# Patient Record
Sex: MALE | Race: WHITE | NOT HISPANIC OR LATINO | Employment: UNEMPLOYED | URBAN - METROPOLITAN AREA
[De-identification: names, ages, dates, MRNs, and addresses within clinical notes are randomized per-mention and may not be internally consistent; named-entity substitution may affect disease eponyms.]

---

## 2018-02-25 ENCOUNTER — HOSPITAL ENCOUNTER (EMERGENCY)
Facility: HOSPITAL | Age: 58
Discharge: PSYCHIATRIC OR SUBSTANCE USE FACILITY | End: 2018-02-25
Attending: EMERGENCY MEDICINE
Payer: MEDICAID

## 2018-02-25 VITALS
RESPIRATION RATE: 14 BRPM | SYSTOLIC BLOOD PRESSURE: 119 MMHG | HEART RATE: 68 BPM | TEMPERATURE: 98 F | OXYGEN SATURATION: 97 % | WEIGHT: 150 LBS | DIASTOLIC BLOOD PRESSURE: 67 MMHG

## 2018-02-25 DIAGNOSIS — F29 PSYCHOSIS, UNSPECIFIED PSYCHOSIS TYPE: Primary | ICD-10-CM

## 2018-02-25 LAB
ALBUMIN SERPL BCP-MCNC: 3 G/DL
ALP SERPL-CCNC: 85 U/L
ALT SERPL W/O P-5'-P-CCNC: 30 U/L
AMPHET+METHAMPHET UR QL: NEGATIVE
AMPHET+METHAMPHET UR QL: NEGATIVE
ANION GAP SERPL CALC-SCNC: 12 MMOL/L
APAP SERPL-MCNC: <3 UG/ML
AST SERPL-CCNC: 46 U/L
BACTERIA #/AREA URNS AUTO: ABNORMAL /HPF
BARBITURATES UR QL SCN>200 NG/ML: NEGATIVE
BARBITURATES UR QL SCN>200 NG/ML: NEGATIVE
BASOPHILS # BLD AUTO: 0.04 K/UL
BASOPHILS NFR BLD: 0.3 %
BENZODIAZ UR QL SCN>200 NG/ML: NEGATIVE
BENZODIAZ UR QL SCN>200 NG/ML: NEGATIVE
BILIRUB SERPL-MCNC: 1.3 MG/DL
BILIRUB UR QL STRIP: NEGATIVE
BUN SERPL-MCNC: 16 MG/DL
BZE UR QL SCN: NEGATIVE
BZE UR QL SCN: NEGATIVE
CALCIUM SERPL-MCNC: 7.5 MG/DL
CANNABINOIDS UR QL SCN: NEGATIVE
CANNABINOIDS UR QL SCN: NEGATIVE
CHLORIDE SERPL-SCNC: 105 MMOL/L
CLARITY UR REFRACT.AUTO: ABNORMAL
CO2 SERPL-SCNC: 15 MMOL/L
COLOR UR AUTO: ABNORMAL
CREAT SERPL-MCNC: 0.8 MG/DL
CREAT UR-MCNC: 148 MG/DL
CREAT UR-MCNC: 148 MG/DL
DIFFERENTIAL METHOD: ABNORMAL
EOSINOPHIL # BLD AUTO: 0 K/UL
EOSINOPHIL NFR BLD: 0.3 %
ERYTHROCYTE [DISTWIDTH] IN BLOOD BY AUTOMATED COUNT: 12.6 %
EST. GFR  (AFRICAN AMERICAN): >60 ML/MIN/1.73 M^2
EST. GFR  (NON AFRICAN AMERICAN): >60 ML/MIN/1.73 M^2
ETHANOL SERPL-MCNC: <10 MG/DL
ETHANOL UR-MCNC: <10 MG/DL
GLUCOSE SERPL-MCNC: 79 MG/DL
GLUCOSE UR QL STRIP: NEGATIVE
HCT VFR BLD AUTO: 33.6 %
HGB BLD-MCNC: 11.8 G/DL
HGB UR QL STRIP: ABNORMAL
HYALINE CASTS UR QL AUTO: 21 /LPF
IMM GRANULOCYTES # BLD AUTO: 0.07 K/UL
IMM GRANULOCYTES NFR BLD AUTO: 0.5 %
KETONES UR QL STRIP: ABNORMAL
LEUKOCYTE ESTERASE UR QL STRIP: NEGATIVE
LYMPHOCYTES # BLD AUTO: 0.9 K/UL
LYMPHOCYTES NFR BLD: 6.5 %
MCH RBC QN AUTO: 31.8 PG
MCHC RBC AUTO-ENTMCNC: 35.1 G/DL
MCV RBC AUTO: 91 FL
METHADONE UR QL SCN>300 NG/ML: NEGATIVE
METHADONE UR QL SCN>300 NG/ML: NEGATIVE
MICROSCOPIC COMMENT: ABNORMAL
MONOCYTES # BLD AUTO: 1.2 K/UL
MONOCYTES NFR BLD: 9 %
NEUTROPHILS # BLD AUTO: 11.4 K/UL
NEUTROPHILS NFR BLD: 83.4 %
NITRITE UR QL STRIP: NEGATIVE
NRBC BLD-RTO: 0 /100 WBC
OPIATES UR QL SCN: NEGATIVE
OPIATES UR QL SCN: NEGATIVE
PCP UR QL SCN>25 NG/ML: NEGATIVE
PCP UR QL SCN>25 NG/ML: NEGATIVE
PH UR STRIP: 5 [PH] (ref 5–8)
PLATELET # BLD AUTO: 179 K/UL
PLATELET BLD QL SMEAR: ABNORMAL
PMV BLD AUTO: 10.5 FL
POTASSIUM SERPL-SCNC: 3.7 MMOL/L
PROT SERPL-MCNC: 6.4 G/DL
PROT UR QL STRIP: NEGATIVE
RBC # BLD AUTO: 3.71 M/UL
RBC #/AREA URNS AUTO: 4 /HPF (ref 0–4)
SALICYLATES SERPL-MCNC: <5 MG/DL
SODIUM SERPL-SCNC: 132 MMOL/L
SP GR UR STRIP: 1.02 (ref 1–1.03)
SQUAMOUS #/AREA URNS AUTO: 0 /HPF
T4 FREE SERPL-MCNC: 1.25 NG/DL
TOXICOLOGY INFORMATION: NORMAL
TOXICOLOGY INFORMATION: NORMAL
TSH SERPL DL<=0.005 MIU/L-ACNC: 0.32 UIU/ML
URN SPEC COLLECT METH UR: ABNORMAL
UROBILINOGEN UR STRIP-ACNC: ABNORMAL EU/DL
WBC # BLD AUTO: 13.67 K/UL
WBC #/AREA URNS AUTO: 1 /HPF (ref 0–5)

## 2018-02-25 PROCEDURE — 96375 TX/PRO/DX INJ NEW DRUG ADDON: CPT

## 2018-02-25 PROCEDURE — 80329 ANALGESICS NON-OPIOID 1 OR 2: CPT

## 2018-02-25 PROCEDURE — 84439 ASSAY OF FREE THYROXINE: CPT

## 2018-02-25 PROCEDURE — 93005 ELECTROCARDIOGRAM TRACING: CPT | Mod: 59

## 2018-02-25 PROCEDURE — 99285 EMERGENCY DEPT VISIT HI MDM: CPT | Mod: 25

## 2018-02-25 PROCEDURE — 63600175 PHARM REV CODE 636 W HCPCS

## 2018-02-25 PROCEDURE — 84443 ASSAY THYROID STIM HORMONE: CPT

## 2018-02-25 PROCEDURE — 99285 EMERGENCY DEPT VISIT HI MDM: CPT | Mod: ,,, | Performed by: EMERGENCY MEDICINE

## 2018-02-25 PROCEDURE — 80307 DRUG TEST PRSMV CHEM ANLYZR: CPT

## 2018-02-25 PROCEDURE — 81001 URINALYSIS AUTO W/SCOPE: CPT

## 2018-02-25 PROCEDURE — 93010 ELECTROCARDIOGRAM REPORT: CPT | Mod: ,,, | Performed by: INTERNAL MEDICINE

## 2018-02-25 PROCEDURE — 85025 COMPLETE CBC W/AUTO DIFF WBC: CPT

## 2018-02-25 PROCEDURE — 80053 COMPREHEN METABOLIC PANEL: CPT

## 2018-02-25 PROCEDURE — 80320 DRUG SCREEN QUANTALCOHOLS: CPT

## 2018-02-25 PROCEDURE — 96374 THER/PROPH/DIAG INJ IV PUSH: CPT

## 2018-02-25 RX ORDER — LORAZEPAM 2 MG/ML
2 INJECTION INTRAMUSCULAR
Status: COMPLETED | OUTPATIENT
Start: 2018-02-25 | End: 2018-02-25

## 2018-02-25 RX ORDER — HALOPERIDOL 5 MG/ML
5 INJECTION INTRAMUSCULAR EVERY 4 HOURS PRN
Status: DISCONTINUED | OUTPATIENT
Start: 2018-02-25 | End: 2018-02-25 | Stop reason: HOSPADM

## 2018-02-25 RX ORDER — HALOPERIDOL 5 MG/ML
5 INJECTION INTRAMUSCULAR
Status: COMPLETED | OUTPATIENT
Start: 2018-02-25 | End: 2018-02-25

## 2018-02-25 RX ORDER — LORAZEPAM 2 MG/ML
INJECTION INTRAMUSCULAR
Status: COMPLETED
Start: 2018-02-25 | End: 2018-02-25

## 2018-02-25 RX ORDER — DIPHENHYDRAMINE HYDROCHLORIDE 50 MG/ML
25 INJECTION INTRAMUSCULAR; INTRAVENOUS
Status: COMPLETED | OUTPATIENT
Start: 2018-02-25 | End: 2018-02-25

## 2018-02-25 RX ORDER — HALOPERIDOL 5 MG/ML
INJECTION INTRAMUSCULAR
Status: COMPLETED
Start: 2018-02-25 | End: 2018-02-25

## 2018-02-25 RX ORDER — DIPHENHYDRAMINE HYDROCHLORIDE 50 MG/ML
INJECTION INTRAMUSCULAR; INTRAVENOUS
Status: COMPLETED
Start: 2018-02-25 | End: 2018-02-25

## 2018-02-25 RX ORDER — LORAZEPAM 2 MG/ML
2 INJECTION INTRAMUSCULAR EVERY 4 HOURS PRN
Status: DISCONTINUED | OUTPATIENT
Start: 2018-02-25 | End: 2018-02-25 | Stop reason: HOSPADM

## 2018-02-25 RX ADMIN — DIPHENHYDRAMINE HYDROCHLORIDE 25 MG: 50 INJECTION INTRAMUSCULAR; INTRAVENOUS at 03:02

## 2018-02-25 RX ADMIN — LORAZEPAM 2 MG: 2 INJECTION INTRAMUSCULAR at 03:02

## 2018-02-25 RX ADMIN — HALOPERIDOL 5 MG: 5 INJECTION INTRAMUSCULAR at 03:02

## 2018-02-25 RX ADMIN — HALOPERIDOL LACTATE 5 MG: 5 INJECTION, SOLUTION INTRAMUSCULAR at 03:02

## 2018-02-25 RX ADMIN — LORAZEPAM 2 MG: 2 INJECTION, SOLUTION INTRAMUSCULAR; INTRAVENOUS at 03:02

## 2018-02-25 RX ADMIN — DIPHENHYDRAMINE HYDROCHLORIDE 25 MG: 50 INJECTION, SOLUTION INTRAMUSCULAR; INTRAVENOUS at 03:02

## 2018-02-25 NOTE — ED PROVIDER NOTES
"Encounter Date: 2/25/2018    SCRIBE #1 NOTE: I, Micheline Shah, am scribing for, and in the presence of,  Dr. Osborne. I have scribed the following portions of the note - the EKG reading and the Resident attestation.       History     Chief Complaint   Patient presents with    Psychiatric Evaluation     pt presents to the ed for a psych eval was found by [employer] saying " im a [employer] Im not suppposed to steal."      She is a 57-year-old male with unknown medical history, who presents via police for psychiatric evaluation x 1 day. History is limited to the medical record as the patient was brought by police and required sedative medication to protect himself and staff, due to severe agitation. Per police reports, he was found by police walking in the street, repeating phrases like "I'm a [employer], I'm not supposed to steal". Reportedly he was severely agitated in the field, requiring multiple police officers to restrain him. In the ED he was severely agitated and required IV medications to protect himself and the staff.           Review of patient's allergies indicates:  Not on File  No past medical history on file.  No past surgical history on file.  No family history on file.  Social History   Substance Use Topics    Smoking status: Not on file    Smokeless tobacco: Not on file    Alcohol use Not on file     Review of Systems   Unable to perform ROS: Psychiatric disorder (Not able to be performed due to severe agitation, then sedation after medications)       Physical Exam     Initial Vitals [02/25/18 0246]   BP Pulse Resp Temp SpO2   (!) 170/90 104 18 98.2 °F (36.8 °C) 98 %      MAP       116.67         Physical Exam    Nursing note and vitals reviewed.  Constitutional: He appears well-developed and well-nourished. He is not diaphoretic. No distress.   HENT:   Head: Normocephalic.       Mouth/Throat: No oropharyngeal exudate.   No septal hematoma or hemotympanum.    Eyes: Pupils are equal, round, and reactive to " light. Right eye exhibits no discharge. Left eye exhibits no discharge. No scleral icterus.   Neck: No tracheal deviation present.   Cardiovascular: Normal rate, regular rhythm, normal heart sounds and intact distal pulses. Exam reveals no friction rub.    No murmur heard.  Tachy in triage, currently in the 90s. Radial pulses are synchronous and symmetric bilaterally.    Pulmonary/Chest: Breath sounds normal. No respiratory distress. He has no wheezes. He has no rhonchi. He has no rales.       Abdominal: Soft. Bowel sounds are normal. He exhibits no distension. There is no tenderness. There is no guarding.   Musculoskeletal: He exhibits no edema or tenderness.   Patient has skin abrasions listed, but no bony crepitus or deformity through palpation of his upper and lower extremities. No palpable crepitus in his chest wall or evidence of chest wall deformity.    Neurological:   Solmnolent post sedative medications. I have observed him moving all extremities.    Skin: Skin is warm and dry. Capillary refill takes less than 2 seconds.   Psychiatric: He has a normal mood and affect.         ED Course   Procedures  Labs Reviewed   CBC W/ AUTO DIFFERENTIAL - Abnormal; Notable for the following:        Result Value    WBC 13.67 (*)     RBC 3.71 (*)     Hemoglobin 11.8 (*)     Hematocrit 33.6 (*)     MCH 31.8 (*)     Gran # (ANC) 11.4 (*)     Immature Grans (Abs) 0.07 (*)     Lymph # 0.9 (*)     Mono # 1.2 (*)     Gran% 83.4 (*)     Lymph% 6.5 (*)     All other components within normal limits   TSH - Abnormal; Notable for the following:     TSH 0.321 (*)     All other components within normal limits   URINALYSIS - Abnormal; Notable for the following:     Appearance, UA Hazy (*)     Ketones, UA 3+ (*)     Occult Blood UA 2+ (*)     Urobilinogen, UA 2.0-3.0 (*)     All other components within normal limits   ACETAMINOPHEN LEVEL - Abnormal; Notable for the following:     Acetaminophen (Tylenol), Serum <3.0 (*)     All other  components within normal limits   COMPREHENSIVE METABOLIC PANEL - Abnormal; Notable for the following:     Sodium 132 (*)     CO2 15 (*)     Calcium 7.5 (*)     Albumin 3.0 (*)     Total Bilirubin 1.3 (*)     AST 46 (*)     All other components within normal limits   SALICYLATE LEVEL - Abnormal; Notable for the following:     Salicylate Lvl <5.0 (*)     All other components within normal limits   URINALYSIS MICROSCOPIC - Abnormal; Notable for the following:     Bacteria, UA Few (*)     Hyaline Casts, UA 21 (*)     All other components within normal limits   DRUG SCREEN PANEL, URINE EMERGENCY   ALCOHOL,MEDICAL (ETHANOL)   TOXICOLOGY SCREEN, URINE, RANDOM (COMPLIANCE)   T4, FREE     EKG Readings: (Independently Interpreted)   Sinus tachycardia at 102 bpm, LAD, ST segments are normal and T waves are normal.          Medical Decision Making:   History:   Old Medical Records: I decided to obtain old medical records.  Independently Interpreted Test(s):   I have ordered and independently interpreted EKG Reading(s) - see prior notes  Clinical Tests:   Lab Tests: Ordered and Reviewed  Medical Tests: Ordered and Reviewed       APC / Resident Notes:   PGY-3 MDM:   -Patient is a 57 y.o. presenting with a chief complaint of psychiatric disorder x 1 day. Vital signs stable, patient afebrile, non-tachycardic and non-hypoxic.   -Patient required multiple police to apprehend him in the street, after being found wandering repeating that he does not steal. He required sedative medications due to severe agitation in the ED. Will order psychiatric clearance medications and PEC as he is a danger to himself.     Workup ongoing, will continue to re-assess patient and update management as needed.           Pt is medically cleared for inpatient psychiatric admission.         Patient Care Update:  -Tylenol, Salicylates and alcohol negative.   -CBC shows slight leukocytoiss to 13.6 which may be related to agitation, as well as normocytic  anemia. TSH low at 0.31 but FT4 normal. EKG with sinus tach. Urine tox pending.   -Care signed out to oncoming team for following Urine tox and clearance after chemical sedative medications.  -In brief, patient with reported history of Schizophrenia per police, was found in the street repeating odd phrases and displaying severe agitation, which required chemical sedation in the ED. He is pending urinalysis for medical clearance and may be medically cleared for psychiatry once this returns and once his chemical sedation clears.     Dereje Knott,   [employer] EM/IM PGY-3  2/25/2018 6:43 AM            Scribe Attestation:   Scribe #1: I performed the above scribed service and the documentation accurately describes the services I performed. I attest to the accuracy of the note.    Attending Attestation:   Physician Attestation Statement for Resident:  As the supervising MD   Physician Attestation Statement: I have personally seen and examined this patient.   I agree with the above history. -: Pt with hx of schizophrenia presenting in restraints for yelling in public. Pt was agitated and psychotic on arrival and required chemical sedation. Will obtain psych screening labs, PEC completed.    As the supervising MD I agree with the above PE.    As the supervising MD I agree with the above treatment, course, plan, and disposition.  I have reviewed and agree with the residents interpretation of the following: EKG and lab data.  I have reviewed the following: old records at this facility.          Physician Attestation for Scribe:      Comments: I, Dr. Jesus Osborne, personally performed the services described in this documentation. All medical record entries made by the scribe were at my direction and in my presence.  I have reviewed the chart and agree that the record reflects my personal performance and is accurate and complete. Jesus Osborne MD.  6:53 AM 02/25/2018                 Clinical Impression:   The encounter diagnosis  was Psychosis, unspecified psychosis type.                           Dereje Knott MD  Resident  02/25/18 5427

## 2018-02-25 NOTE — ED NOTES
Pt assisted to bed. Pt identifiers verified.  Appearance: Pt is awake and alert to person.  Respiratory: respirations are even and unlabored. Airway is patent  Skin: skin is warm, dry, intact and appropriately colored for ethnicity  Neurologic: pt is moving all extremities without difficulty. Sensation is intact. Speech is clear and appropriate. Facial movement is symmetrical.   Cardiac: No edema noted. Peripheral pulses are intact and palpable. Cap refill is <3 seconds.     Bed is in low, locked position with side rails upx2. Call light is in reach. Will continue to monitor

## 2018-02-25 NOTE — ED NOTES
[facility] contacted for transport to [facility]. [facility] advised transportation will arrived within an hour.

## 2018-02-25 NOTE — ED TRIAGE NOTES
57 year old pt presents to the ed with complaints of needing a psy evaluation. Pt was fighting with police and being  disruptive. Pt is disoriented x 2. Pt denies chest pain sob or nausea.

## 2018-02-25 NOTE — ED NOTES
Faxed clinical packet to [facility], [place], [facility] ([place], [place], [place]), [facility] ([place]), [facility], [facility], [facility], [facility], [facility], [facility], [facility], [facility], [facility], [facility], [facility], [facility], [facility], [facility], [facility] [facility], [place], [facility], [facility], [facility], [facility], [facility], [facility], [facility], [facility]), [facility], [facility], [facility], and [facility]. Awaiting response at this time.

## 2018-02-25 NOTE — ED NOTES
Juan Reynaga pt has been accepted to [facility] ([facility]) under the care of . The number to call report [phone].

## 2018-08-02 ENCOUNTER — HOSPITAL ENCOUNTER (INPATIENT)
Facility: HOSPITAL | Age: 58
LOS: 3 days | Discharge: HOME | DRG: 101 | End: 2018-08-05
Attending: EMERGENCY MEDICINE | Admitting: EMERGENCY MEDICINE
Payer: MEDICAID

## 2018-08-02 DIAGNOSIS — Z59.00 HOMELESS: ICD-10-CM

## 2018-08-02 DIAGNOSIS — R20.0 NUMBNESS IN FEET: ICD-10-CM

## 2018-08-02 DIAGNOSIS — G40.909 RECURRENT SEIZURES: Primary | ICD-10-CM

## 2018-08-02 DIAGNOSIS — G40.109 LOCALIZATION-RELATED EPILEPSY: ICD-10-CM

## 2018-08-02 DIAGNOSIS — Z91.148 NONCOMPLIANCE WITH MEDICATIONS: ICD-10-CM

## 2018-08-02 DIAGNOSIS — R79.89 LOW VITAMIN B12 LEVEL: ICD-10-CM

## 2018-08-02 LAB
ANION GAP SERPL CALC-SCNC: 5 MMOL/L
BUN SERPL-MCNC: 16 MG/DL
CALCIUM SERPL-MCNC: 8.6 MG/DL
CHLORIDE SERPL-SCNC: 107 MMOL/L
CO2 SERPL-SCNC: 26 MMOL/L
CREAT SERPL-MCNC: 0.9 MG/DL
EST. GFR  (AFRICAN AMERICAN): >60 ML/MIN/1.73 M^2
EST. GFR  (NON AFRICAN AMERICAN): >60 ML/MIN/1.73 M^2
GLUCOSE SERPL-MCNC: 114 MG/DL
LACTATE SERPL-SCNC: 0.9 MMOL/L
POTASSIUM SERPL-SCNC: 3.7 MMOL/L
SODIUM SERPL-SCNC: 138 MMOL/L
TROPONIN I SERPL DL<=0.01 NG/ML-MCNC: <0.006 NG/ML

## 2018-08-02 PROCEDURE — 83605 ASSAY OF LACTIC ACID: CPT | Mod: 91

## 2018-08-02 PROCEDURE — 99285 EMERGENCY DEPT VISIT HI MDM: CPT | Mod: 25,27

## 2018-08-02 PROCEDURE — 25000003 PHARM REV CODE 250: Performed by: PHYSICIAN ASSISTANT

## 2018-08-02 PROCEDURE — G0378 HOSPITAL OBSERVATION PER HR: HCPCS

## 2018-08-02 PROCEDURE — 80048 BASIC METABOLIC PNL TOTAL CA: CPT

## 2018-08-02 PROCEDURE — 84484 ASSAY OF TROPONIN QUANT: CPT | Mod: 91

## 2018-08-02 PROCEDURE — 12000002 HC ACUTE/MED SURGE SEMI-PRIVATE ROOM

## 2018-08-02 PROCEDURE — 99285 EMERGENCY DEPT VISIT HI MDM: CPT | Mod: ,,, | Performed by: EMERGENCY MEDICINE

## 2018-08-02 PROCEDURE — 99220 PR INITIAL OBSERVATION CARE,LEVL III: CPT | Mod: ,,, | Performed by: PHYSICIAN ASSISTANT

## 2018-08-02 RX ORDER — SODIUM CHLORIDE 0.9 % (FLUSH) 0.9 %
5 SYRINGE (ML) INJECTION
Status: DISCONTINUED | OUTPATIENT
Start: 2018-08-02 | End: 2018-08-05 | Stop reason: HOSPADM

## 2018-08-02 RX ORDER — GLUCAGON 1 MG
1 KIT INJECTION
Status: DISCONTINUED | OUTPATIENT
Start: 2018-08-02 | End: 2018-08-05 | Stop reason: HOSPADM

## 2018-08-02 RX ORDER — LEVETIRACETAM 500 MG/1
500 TABLET ORAL 2 TIMES DAILY
Status: DISCONTINUED | OUTPATIENT
Start: 2018-08-02 | End: 2018-08-03

## 2018-08-02 RX ORDER — PROMETHAZINE HYDROCHLORIDE 25 MG/1
25 TABLET ORAL EVERY 6 HOURS PRN
Status: DISCONTINUED | OUTPATIENT
Start: 2018-08-02 | End: 2018-08-05 | Stop reason: HOSPADM

## 2018-08-02 RX ORDER — AMOXICILLIN 250 MG
1 CAPSULE ORAL 2 TIMES DAILY PRN
Status: DISCONTINUED | OUTPATIENT
Start: 2018-08-02 | End: 2018-08-05 | Stop reason: HOSPADM

## 2018-08-02 RX ORDER — IBUPROFEN 200 MG
16 TABLET ORAL
Status: DISCONTINUED | OUTPATIENT
Start: 2018-08-02 | End: 2018-08-05 | Stop reason: HOSPADM

## 2018-08-02 RX ORDER — KETOROLAC TROMETHAMINE 30 MG/ML
15 INJECTION, SOLUTION INTRAMUSCULAR; INTRAVENOUS EVERY 6 HOURS PRN
Status: DISCONTINUED | OUTPATIENT
Start: 2018-08-02 | End: 2018-08-05 | Stop reason: HOSPADM

## 2018-08-02 RX ORDER — IPRATROPIUM BROMIDE AND ALBUTEROL SULFATE 2.5; .5 MG/3ML; MG/3ML
3 SOLUTION RESPIRATORY (INHALATION) EVERY 4 HOURS PRN
Status: DISCONTINUED | OUTPATIENT
Start: 2018-08-02 | End: 2018-08-05 | Stop reason: HOSPADM

## 2018-08-02 RX ORDER — IBUPROFEN 200 MG
24 TABLET ORAL
Status: DISCONTINUED | OUTPATIENT
Start: 2018-08-02 | End: 2018-08-05 | Stop reason: HOSPADM

## 2018-08-02 RX ORDER — RAMELTEON 8 MG/1
8 TABLET ORAL NIGHTLY PRN
Status: DISCONTINUED | OUTPATIENT
Start: 2018-08-02 | End: 2018-08-05 | Stop reason: HOSPADM

## 2018-08-02 RX ORDER — ACETAMINOPHEN 325 MG/1
650 TABLET ORAL EVERY 4 HOURS PRN
Status: DISCONTINUED | OUTPATIENT
Start: 2018-08-02 | End: 2018-08-05 | Stop reason: HOSPADM

## 2018-08-02 RX ORDER — ONDANSETRON 4 MG/1
4 TABLET, ORALLY DISINTEGRATING ORAL EVERY 8 HOURS PRN
Status: DISCONTINUED | OUTPATIENT
Start: 2018-08-02 | End: 2018-08-05 | Stop reason: HOSPADM

## 2018-08-02 RX ADMIN — LEVETIRACETAM 500 MG: 500 TABLET ORAL at 09:08

## 2018-08-02 SDOH — SOCIAL DETERMINANTS OF HEALTH (SDOH): HOMELESSNESS UNSPECIFIED: Z59.00

## 2018-08-03 PROBLEM — Z59.00 HOMELESS: Status: ACTIVE | Noted: 2018-08-03

## 2018-08-03 PROBLEM — Z91.148 NONCOMPLIANCE WITH MEDICATIONS: Status: ACTIVE | Noted: 2018-08-03

## 2018-08-03 LAB
25(OH)D3+25(OH)D2 SERPL-MCNC: 33 NG/ML
ANION GAP SERPL CALC-SCNC: 6 MMOL/L
BASOPHILS # BLD AUTO: 0.05 K/UL
BASOPHILS NFR BLD: 0.6 %
BILIRUB UR QL STRIP: NEGATIVE
BUN SERPL-MCNC: 12 MG/DL
CALCIUM SERPL-MCNC: 8.9 MG/DL
CHLORIDE SERPL-SCNC: 108 MMOL/L
CLARITY UR REFRACT.AUTO: CLEAR
CO2 SERPL-SCNC: 26 MMOL/L
COLOR UR AUTO: YELLOW
CREAT SERPL-MCNC: 0.8 MG/DL
DIFFERENTIAL METHOD: ABNORMAL
EOSINOPHIL # BLD AUTO: 0 K/UL
EOSINOPHIL NFR BLD: 0.4 %
ERYTHROCYTE [DISTWIDTH] IN BLOOD BY AUTOMATED COUNT: 13.2 %
EST. GFR  (AFRICAN AMERICAN): >60 ML/MIN/1.73 M^2
EST. GFR  (NON AFRICAN AMERICAN): >60 ML/MIN/1.73 M^2
GLUCOSE SERPL-MCNC: 79 MG/DL
GLUCOSE UR QL STRIP: NEGATIVE
HCT VFR BLD AUTO: 37.5 %
HGB BLD-MCNC: 12.8 G/DL
HGB UR QL STRIP: NEGATIVE
HIV 1+2 AB+HIV1 P24 AG SERPL QL IA: NEGATIVE
IMM GRANULOCYTES # BLD AUTO: 0.03 K/UL
IMM GRANULOCYTES NFR BLD AUTO: 0.4 %
KETONES UR QL STRIP: NEGATIVE
LEUKOCYTE ESTERASE UR QL STRIP: NEGATIVE
LYMPHOCYTES # BLD AUTO: 1.5 K/UL
LYMPHOCYTES NFR BLD: 18.7 %
MAGNESIUM SERPL-MCNC: 2.4 MG/DL
MCH RBC QN AUTO: 31.4 PG
MCHC RBC AUTO-ENTMCNC: 34.1 G/DL
MCV RBC AUTO: 92 FL
MONOCYTES # BLD AUTO: 1 K/UL
MONOCYTES NFR BLD: 11.9 %
NEUTROPHILS # BLD AUTO: 5.5 K/UL
NEUTROPHILS NFR BLD: 68 %
NITRITE UR QL STRIP: NEGATIVE
NRBC BLD-RTO: 0 /100 WBC
PH UR STRIP: 6 [PH] (ref 5–8)
PHOSPHATE SERPL-MCNC: 3.3 MG/DL
PLATELET # BLD AUTO: 244 K/UL
PMV BLD AUTO: 10.5 FL
POTASSIUM SERPL-SCNC: 3.6 MMOL/L
PROT UR QL STRIP: NEGATIVE
RBC # BLD AUTO: 4.08 M/UL
SODIUM SERPL-SCNC: 140 MMOL/L
SP GR UR STRIP: 1.01 (ref 1–1.03)
URN SPEC COLLECT METH UR: NORMAL
UROBILINOGEN UR STRIP-ACNC: NEGATIVE EU/DL
VIT B12 SERPL-MCNC: 324 PG/ML
WBC # BLD AUTO: 8.14 K/UL

## 2018-08-03 PROCEDURE — 84100 ASSAY OF PHOSPHORUS: CPT

## 2018-08-03 PROCEDURE — 95813 EEG EXTND MNTR 61-119 MIN: CPT

## 2018-08-03 PROCEDURE — 12000002 HC ACUTE/MED SURGE SEMI-PRIVATE ROOM

## 2018-08-03 PROCEDURE — 36415 COLL VENOUS BLD VENIPUNCTURE: CPT

## 2018-08-03 PROCEDURE — 80048 BASIC METABOLIC PNL TOTAL CA: CPT

## 2018-08-03 PROCEDURE — 85025 COMPLETE CBC W/AUTO DIFF WBC: CPT

## 2018-08-03 PROCEDURE — 80177 DRUG SCRN QUAN LEVETIRACETAM: CPT

## 2018-08-03 PROCEDURE — 82306 VITAMIN D 25 HYDROXY: CPT

## 2018-08-03 PROCEDURE — 86703 HIV-1/HIV-2 1 RESULT ANTBDY: CPT

## 2018-08-03 PROCEDURE — 81003 URINALYSIS AUTO W/O SCOPE: CPT

## 2018-08-03 PROCEDURE — 84207 ASSAY OF VITAMIN B-6: CPT

## 2018-08-03 PROCEDURE — 99233 SBSQ HOSP IP/OBS HIGH 50: CPT | Mod: ,,, | Performed by: PSYCHIATRY & NEUROLOGY

## 2018-08-03 PROCEDURE — 82607 VITAMIN B-12: CPT

## 2018-08-03 PROCEDURE — 84425 ASSAY OF VITAMIN B-1: CPT

## 2018-08-03 PROCEDURE — 25000003 PHARM REV CODE 250: Performed by: PHYSICIAN ASSISTANT

## 2018-08-03 PROCEDURE — 95813 EEG EXTND MNTR 61-119 MIN: CPT | Mod: 26,,, | Performed by: PSYCHIATRY & NEUROLOGY

## 2018-08-03 PROCEDURE — 83735 ASSAY OF MAGNESIUM: CPT

## 2018-08-03 PROCEDURE — 99223 1ST HOSP IP/OBS HIGH 75: CPT | Mod: ,,, | Performed by: PHYSICIAN ASSISTANT

## 2018-08-03 RX ORDER — LEVETIRACETAM 500 MG/1
1000 TABLET ORAL 2 TIMES DAILY
Status: DISCONTINUED | OUTPATIENT
Start: 2018-08-03 | End: 2018-08-05 | Stop reason: HOSPADM

## 2018-08-03 RX ADMIN — LEVETIRACETAM 1000 MG: 500 TABLET ORAL at 09:08

## 2018-08-03 RX ADMIN — LEVETIRACETAM 500 MG: 500 TABLET ORAL at 08:08

## 2018-08-03 NOTE — SUBJECTIVE & OBJECTIVE
Past Medical History:   Diagnosis Date    Seizures        History reviewed. No pertinent surgical history.    Review of patient's allergies indicates:  No Known Allergies      Current Facility-Administered Medications on File Prior to Encounter   Medication    [COMPLETED] acetaminophen tablet 1,000 mg    [COMPLETED] levETIRAcetam (KEPPRA) 2,000 mg in dextrose 5 % 250 mL IVPB    [COMPLETED] sodium chloride 0.9% bolus 1,000 mL     Current Outpatient Prescriptions on File Prior to Encounter   Medication Sig    levETIRAcetam (KEPPRA) 500 MG Tab Take 1 tablet (500 mg total) by mouth 2 (two) times daily.     Family History     None        Social History Main Topics    Smoking status: Former Smoker    Smokeless tobacco: Never Used    Alcohol use Yes    Drug use: No    Sexual activity: Not on file     Review of Systems   Constitutional: Negative.    HENT: Positive for dental problem (broke teeth last year) and nosebleeds.    Eyes: Negative.    Respiratory: Negative.    Cardiovascular: Negative.  Negative for chest pain.   Gastrointestinal: Negative.    Endocrine: Negative.    Genitourinary: Negative.         No incontinence   Musculoskeletal: Negative.  Negative for gait problem, neck pain and neck stiffness.   Skin: Positive for wound (nose wound).   Allergic/Immunologic: Negative.    Neurological: Positive for seizures. Negative for dizziness, tremors, syncope, facial asymmetry, speech difficulty, weakness, light-headedness, numbness and headaches.   Hematological: Negative.    Psychiatric/Behavioral: Negative.      Objective:     Vital Signs (Most Recent):  Temp: 98 °F (36.7 °C) (08/03/18 0844)  Pulse: 72 (08/03/18 0844)  Resp: 18 (08/03/18 0844)  BP: 108/67 (08/03/18 0844)  SpO2: 96 % (08/03/18 0844) Vital Signs (24h Range):  Temp:  [97.1 °F (36.2 °C)-98.4 °F (36.9 °C)] 98 °F (36.7 °C)  Pulse:  [58-99] 72  Resp:  [16-20] 18  SpO2:  [94 %-100 %] 96 %  BP: ()/(55-90) 108/67     Weight: 74.4 kg (164 lb 0.4  oz)  Body mass index is 23.53 kg/m².    Physical Exam   Constitutional: He is oriented to person, place, and time. He appears well-developed and well-nourished.   HENT:   Head: Normocephalic and atraumatic.   Right Ear: External ear normal.   Left Ear: External ear normal.   Eyes: Conjunctivae and EOM are normal. Pupils are equal, round, and reactive to light.   Neck: Normal range of motion. Neck supple.   Cardiovascular: Normal rate, regular rhythm and normal heart sounds.    Pulmonary/Chest: Effort normal and breath sounds normal.   Abdominal: Soft. Bowel sounds are normal. There is no tenderness.   Musculoskeletal: Normal range of motion. He exhibits no edema or deformity.   Neurological: He is alert and oriented to person, place, and time. He has a normal Finger-Nose-Finger Test. Gait normal.   Reflex Scores:       Tricep reflexes are 2+ on the right side and 2+ on the left side.       Bicep reflexes are 2+ on the right side and 2+ on the left side.       Brachioradialis reflexes are 2+ on the right side and 2+ on the left side.       Patellar reflexes are 2+ on the right side and 2+ on the left side.       Achilles reflexes are 2+ on the right side and 2+ on the left side.  Skin: Skin is warm and dry. Capillary refill takes less than 2 seconds.   Psychiatric: He has a normal mood and affect. His speech is normal and behavior is normal.       NEUROLOGICAL EXAMINATION:     MENTAL STATUS   Oriented to person, place, and time.   Attention: normal. Concentration: normal.   Speech: speech is normal   Level of consciousness: alert    CRANIAL NERVES     CN III, IV, VI   Pupils are equal, round, and reactive to light.  Extraocular motions are normal.   CN III: no CN III palsy  CN VI: no CN VI palsy  Nystagmus: none   Diplopia: none    CN V   Facial sensation intact.     CN VII   Facial expression full, symmetric.     CN VIII   CN VIII normal.     CN IX, X   CN IX normal.     CN XI   CN XI normal.     CN XII   CN XII  normal.     MOTOR EXAM   Muscle bulk: normal  Overall muscle tone: normal    Strength   Right neck flexion: 5/5  Left neck flexion: 5/5  Right neck extension: 5/5  Left neck extension: 5/5  Right deltoid: 5/5  Left deltoid: 5/5  Right biceps: 5/5  Left biceps: 5/5  Right triceps: 5/5  Left triceps: 5/5  Right wrist flexion: 5/5  Left wrist flexion: 5/5  Right wrist extension: 5/5  Left wrist extension: 5/5  Right interossei: 5/5  Left interossei: 5/5  Right abdominals: 5/5  Left abdominals: 5/5  Right iliopsoas: 5/5  Left iliopsoas: 5/5  Right quadriceps: 5/5  Left quadriceps: 5/5  Right hamstrin/5  Left hamstrin/5  Right glutei: 5/5  Left glutei: 5/5  Right anterior tibial: 5/5  Left anterior tibial: 5/5  Right posterior tibial: 5/5  Left posterior tibial: 5/5  Right peroneal: 5/5  Left peroneal: 5/5  Right gastroc: 5/5  Left gastroc: 5/5    REFLEXES     Reflexes   Right brachioradialis: 2+  Left brachioradialis: 2+  Right biceps: 2+  Left biceps: 2+  Right triceps: 2+  Left triceps: 2+  Right patellar: 2+  Left patellar: 2+  Right achilles: 2+  Left achilles: 2+  Right plantar: normal  Left plantar: normal    SENSORY EXAM   Light touch normal.     GAIT AND COORDINATION     Gait  Gait: normal     Coordination   Finger to nose coordination: normal    Tremor   Resting tremor: absent  Intention tremor: absent      Significant Labs:   Recent Lab Results       18  0450 18  2136 18  1704 18  1628 18  1623      Benzodiazepines    Negative      Methadone metabolites    Negative      Phencyclidine    Negative      Immature Granulocytes 0.4         Immature Grans (Abs) 0.03  Comment:  Mild elevation in immature granulocytes is non specific and   can be seen in a variety of conditions including stress response,   acute inflammation, trauma and pregnancy. Correlation with other   laboratory and clinical findings is essential.           Albumin          Alcohol, Medical, Serum           Alkaline Phosphatase          Allens Test   N/A  N/A     ALT          Amphetamine Screen, Ur    Negative      Anion Gap 6(L) 5(L)        AST          Barbiturate Screen, Ur    Negative      Baso # 0.05         Basophil% 0.6         Total Bilirubin          Site   Other  Other     BUN, Bld 12 16        Calcium 8.9 8.6(L)        Chloride 108 107        CO2 26 26        Cocaine (Metab.)    Negative      Creatinine 0.8 0.9        Creatinine, Random Ur    44.0  Comment:  The random urine reference ranges provided were established   for 24 hour urine collections.  No reference ranges exist for  random urine specimens.  Correlate clinically.        Differential Method Automated         eGFR if  >60.0 >60.0        eGFR if non  >60.0  Comment:  Calculation used to obtain the estimated glomerular filtration  rate (eGFR) is the CKD-EPI equation.    >60.0  Comment:  Calculation used to obtain the estimated glomerular filtration  rate (eGFR) is the CKD-EPI equation.           Eos # 0.0         Eosinophil% 0.4         Glucose 79 114(H)        Gran # (ANC) 5.5         Gran% 68.0         Hematocrit 37.5(L)         Hemoglobin 12.8(L)         Lactate, Johnny  0.9  Comment:  Falsely low lactic acid results can be found in samples   containing >=13.0 mg/dL total bilirubin and/or >=3.5 mg/dL   direct bilirubin.          Lymph # 1.5         Lymph% 18.7         Magnesium 2.4         MCH 31.4(H)         MCHC 34.1         MCV 92         Mono # 1.0         Mono% 11.9         MPV 10.5         nRBC 0         Opiate Scrn, Ur    Negative      Phosphorus 3.3         Platelets 244         POC BE   0  -1     POC HCO3   25.2  24.5     POC Lactate   1.24       POC PCO2   40.5  42.3     POC PH   7.402  7.371     POC PO2   41  42     POC SATURATED O2   77(L)  76(L)     POC TCO2   26  26     POCT Glucose          Potassium 3.6 3.7        Total Protein          RBC 4.08(L)         RDW 13.2         Sample   VENOUS  VENOUS      Sodium 140 138        Marijuana (THC) Metabolite    Negative      Toxicology Information    SEE COMMENT  Comment:  This screen includes the following classes of drugs at the   listed cut-off:  Benzodiazepines                  200 ng/ml  Methadone                        300 ng/ml  Cocaine metabolite               300 ng/ml  Opiates                          300 ng/ml  Barbiturates                     200 ng/ml  Amphetamines                    1000 ng/ml  Marijuana metabs (THC)            50 ng/ml  Phencyclidine (PCP)               25 ng/ml  High concentrations of Diphenhydramine may cross-react with  Phencyclidine PCP screening immunoassay giving a false   positive result.  High concentrations of Methylenedioxymethamphetamine (MDMA aka  Ectasy) and other structurally similar compounds may cross-   react with the Amphetamine/Methamphetamine screening   immunoassay giving a false positive result.  A metabolite of the anti-HIV drug Sustiva () may cause  false positive results in the Marijuana metabolite (THC)   screening assay.  Note: This exception list includes only more common   interferants in toxicology screen testing.  Because of many   cross-reactantspositive results on toxicology drug screens   should be confirmed whenever results do not correlate with   clinical presentation.  This report is intended for use in clinical monitoring and  management of patients. It is not intended for use in   employment related drug testing.  Because of any cross-reactants, positive results on toxicology  drug screens should be confirmed whenever results do not  correlate with clinical presentation.  Presumptive positive results are unconfirmed and may be used   only for medical purposes.        Troponin I  <0.006  Comment:  The reference interval for Troponin I represents the 99th percentile   cutoff   for our facility and is consistent with 3rd generation assay   performance.          WBC 8.14                      08/02/18  1557 08/02/18  1440 08/02/18  1432      Benzodiazepines        Methadone metabolites        Phencyclidine        Immature Granulocytes   0.4     Immature Grans (Abs)   0.03  Comment:  Mild elevation in immature granulocytes is non specific and   can be seen in a variety of conditions including stress response,   acute inflammation, trauma and pregnancy. Correlation with other   laboratory and clinical findings is essential.       Albumin   3.7     Alcohol, Medical, Serum <10       Alkaline Phosphatase   118     Allens Test        ALT   16     Amphetamine Screen, Ur        Anion Gap   19(H)     AST   18     Barbiturate Screen, Ur        Baso #   0.04     Basophil%   0.5     Total Bilirubin   0.4  Comment:  For infants and newborns, interpretation of results should be based  on gestational age, weight and in agreement with clinical  observations.  Premature Infant recommended reference ranges:  Up to 24 hours.............<8.0 mg/dL  Up to 48 hours............<12.0 mg/dL  3-5 days..................<15.0 mg/dL  6-29 days.................<15.0 mg/dL       Site        BUN, Bld   20     Calcium   8.9     Chloride   104     CO2   15(L)     Cocaine (Metab.)        Creatinine   1.0     Creatinine, Random Ur        Differential Method   Automated     eGFR if    >60.0     eGFR if non    >60.0  Comment:  Calculation used to obtain the estimated glomerular filtration  rate (eGFR) is the CKD-EPI equation.        Eos #   0.0     Eosinophil%   0.5     Glucose   106     Gran # (ANC)   6.4     Gran%   79.2(H)     Hematocrit   39.4(L)     Hemoglobin   13.0(L)     Lactate, Johnny   >12.0  Comment:  Falsely low lactic acid results can be found in samples   containing >=13.0 mg/dL total bilirubin and/or >=3.5 mg/dL   direct bilirubin.  *Critical value -  Results called to and read back by:MILLIE DEVLIN RN.  ()     Lymph #   0.9(L)     Lymph%   10.8(L)     Magnesium        MCH   30.8     MCHC    33.0     MCV   93     Mono #   0.7     Mono%   8.6     MPV   10.0     nRBC   0     Opiate Scrn, Ur        Phosphorus        Platelets   262     POC BE        POC HCO3        POC Lactate        POC PCO2        POC PH        POC PO2        POC SATURATED O2        POC TCO2        POCT Glucose  113(H)      Potassium   4.0     Total Protein   6.9     RBC   4.22(L)     RDW   13.2     Sample        Sodium   138     Marijuana (THC) Metabolite        Toxicology Information        Troponin I   0.010  Comment:  The reference interval for Troponin I represents the 99th percentile   cutoff   for our facility and is consistent with 3rd generation assay   performance.       WBC   8.07           Significant Imaging: CT: I have reviewed all pertinent results/findings within the past 24 hours and my personal findings are:  Negative for acute intracranial abnormality  I have reviewed and interpreted all pertinent imaging results/findings within the past 24 hours.

## 2018-08-03 NOTE — HPI
Mr. Yusuf is a 57 y/o M that was brought in by EMS after a unwitnessed seizure in the park and dried blood on the mouth.  He has a PMHx of recurrent seizures that started in 1978 and he has been on a variety of AEDs until February this year, when he stopped taking his medications.  After the initial seizure which brought him to the ED he had another short seizure in the ED.  He was loaded with 2g of Keppra and was observed.  He was discharged, and then in the waiting room had another seizure that was described as 22 min generalized tonic-clonic seizure.  He was then admitted for observation and further workup.  The 22 min seizure self resolved.  The patient did experience postictal confusion which persisted for a short time afterwards.  Initial workup was normal except for a lactic acid which was 12 confirming that a seizure was likely before brought to the ED.    Patient stated that since February he has been having seizure episodes intermittently for short periods, maybe 1-2 mins.  He typically has postictal confusion and paralysis associated with these.  Patient denies chest pain, lightheadedness, dizziness, and gait issues.  Patient reports being sober for a while, UDS and EtOH level were negative.

## 2018-08-03 NOTE — PLAN OF CARE
Problem: Patient Care Overview  Goal: Plan of Care Review  Outcome: Outcome(s) achieved Date Met: 08/03/18 08/03/18 0407   Coping/Psychosocial   Plan Of Care Reviewed With patient       Patient alert and reoriented to place and time. Denies pain at present.  Patient telemetry intact and in a SB-SR heart rate.  Patient admitted with dx of Seizure  epilepticus and side rails padded and patient lying on side.  Patient CT of head without contrast and no abnormalities noted. New orders received for EEG monitoring. Neuro checks intact. Free from falls and discharge date is TBD.

## 2018-08-03 NOTE — ED PROVIDER NOTES
Encounter Date: 8/2/2018    SCRIBE #1 NOTE: I, Bret Alatorre, am scribing for, and in the presence of,  Dr. Armstrong. I have scribed the following portions of the note - Other sections scribed: the Disposition.     I, Dr. Broderick Armstrong, personally performed the services described in this documentation. All medical record entries made by the scribe were at my direction and in my presence.  I have reviewed the chart and agree that the record reflects my personal performance and is accurate and complete.  Broderick Armstrong MD.  10:21 PM 08/03/2018      History     Chief Complaint   Patient presents with    Postictal     Pt was recently discharged for seizures. Pt was found at [facility] machine leaned over in wheelchair. Employees brought him back to ED and pt checked in again.     Patient is a 58-year-old male with a suspected past medical history of seizures that was evaluated earlier in the ER for seizure activity.  The patient had been [place] up [place] a local park by EMS brought to the ER after suspected witnessed seizure activity, but upon arrival was a and O x3. He did experience an episode of 22 min generalized tonic-clonic activity in front of the previous ER attending, that ultimately self-resolved.  The patient's initial lactic acid was 12, confirming the episode of seizure.  Patient's other labs were grossly within normal limits including a negative UDS and normal alcohol level.  After the patient received 2 g of IV Keppra and was observed for several hours and continued to remain a and O x3 was allowed to be discharged home.  While in the waiting room in a wheelchair he experienced subsequent episode of witnessed generalized shaking, and was brought back into the ER.  During my initial evaluation the patient appears to be postictal but is able to respond to his name and the some commands.  There is no acute injury during the secondary episode, as the patient remained in the wheelchair.          Review of patient's allergies  indicates:  No Known Allergies  Past Medical History:   Diagnosis Date    Seizures      History reviewed. No pertinent surgical history.  History reviewed. No pertinent family history.  Social History   Substance Use Topics    Smoking status: Former Smoker    Smokeless tobacco: Never Used    Alcohol use Yes     Review of Systems   Unable to perform ROS: Mental status change       Physical Exam     Initial Vitals [08/02/18 1859]   BP Pulse Resp Temp SpO2   122/67 82 16 98.3 °F (36.8 °C) 96 %      MAP       --         Physical Exam    Nursing note and vitals reviewed.  Constitutional: He appears well-developed and well-nourished.   HENT:   Head: Normocephalic and atraumatic.   Eyes: Conjunctivae and EOM are normal. Pupils are equal, round, and reactive to light.   Neck: Normal range of motion. Neck supple. No tracheal deviation present.   Cardiovascular: Normal rate, regular rhythm, normal heart sounds and intact distal pulses.   Pulmonary/Chest: Breath sounds normal. No respiratory distress. He has no wheezes. He has no rhonchi. He has no rales. He exhibits no tenderness.   Abdominal: Soft. Bowel sounds are normal. He exhibits no distension and no mass. There is no tenderness. There is no rebound and no guarding.   Musculoskeletal: Normal range of motion. He exhibits no edema or tenderness.   Neurological: He displays normal reflexes. No cranial nerve deficit or sensory deficit.   Patient is moving extremities spontaneously and respond to some commands.  There is no obvious signs of acute neurologic dysfunction   Skin: Skin is warm and dry. Capillary refill takes less than 2 seconds.   Psychiatric: He has a normal mood and affect. His behavior is normal. Judgment and thought content normal.         ED Course   Procedures  Labs Reviewed   BASIC METABOLIC PANEL - Abnormal; Notable for the following:        Result Value    Glucose 114 (*)     Calcium 8.6 (*)     Anion Gap 5 (*)     All other components within normal  limits   LACTIC ACID, PLASMA   URINALYSIS   TROPONIN I          Imaging Results          CT Head Without Contrast (Final result)  Result time 08/02/18 19:32:57    Final result by Zafar Ibarra MD (08/02/18 19:32:57)                 Impression:      No acute intracranial abnormalities      Electronically signed by: Zafar Ibarra MD  Date:    08/02/2018  Time:    19:32             Narrative:    EXAMINATION:  CT HEAD WITHOUT CONTRAST    CLINICAL HISTORY:  Seizures new or progressive;    TECHNIQUE:  Low dose axial images were obtained through the head.  Coronal and sagittal reformations were also performed. Contrast was not administered.    COMPARISON:  None.    FINDINGS:  The brain parenchyma appears normal for age with good corticomedullary differentiation.  There is no evidence of acute infarct, hemorrhage, or mass.  The ventricular system is normal in size.  No mass-effect or midline shift.  There are no abnormal extra-axial fluid collections.  The paranasal sinuses and mastoid air cells are clear.  The calvarium appears intact.  .                                 Medical Decision Making:   History:   Old Medical Records: I decided to obtain old medical records.  Clinical Tests:   Lab Tests: Reviewed and Ordered  Radiological Study: Reviewed and Ordered  ED Management:  Patient's head CT was negative for any acute injury, and at this point patient is being admitted for observation because of her current seizure episodes.  Other:   I have discussed this case with another health care provider.                      Clinical Impression:   The encounter diagnosis was Recurrent seizures.      Disposition:   Disposition: Placed in Observation                        Shon Armstrong MD  08/03/18 4313

## 2018-08-03 NOTE — CONSULTS
[facility]  Neurology  Consult Note    Patient Name: Terrell Yusuf  MRN: [id number]  Admission Date: 8/2/2018  Hospital Length of Stay: 0 days  Code Status: Full Code   Attending Provider: Mahnaz Obrien MD   Consulting Provider: Carlos A Tabares MD  Primary Care Physician: Scar Whitfield MD  Principal Problem:Recurrent seizures    Inpatient consult to Neurology  Consult performed by: CARLOS A TABARES  Consult ordered by: CARLOS A GARCIA  Reason for consult: seizures  Assessment/Recommendations: Spot EEG  Continue keppra  MRI Brain w/ & w/o contrast seizure protocol         Subjective:     Chief Complaint:  seizures    HPI:   Mr. Yusuf is a 57 y/o M that was brought in by EMS after a unwitnessed seizure in the park and dried blood on the mouth.  He has a PMHx of recurrent seizures that started in 1978 and he has been on a variety of AEDs until February this year, when he stopped taking his medications.  After the initial seizure which brought him to the ED he had another short seizure in the ED.  He was loaded with 2g of Keppra and was observed.  He was discharged, and then in the waiting room had another seizure that was described as 22 min generalized tonic-clonic seizure.  He was then admitted for observation and further workup.  The 22 min seizure self resolved.  The patient did experience postictal confusion which persisted for a short time afterwards.  Initial workup was normal except for a lactic acid which was 12 confirming that a seizure was likely before brought to the ED.    Patient stated that since February he has been having seizure episodes intermittently for short periods, maybe 1-2 mins.  He typically has postictal confusion and paralysis associated with these.  Patient denies chest pain, lightheadedness, dizziness, and gait issues.  Patient reports being sober for a while, UDS and EtOH level were negative.     Past Medical History:   Diagnosis Date     Seizures        History reviewed. No pertinent surgical history.    Review of patient's allergies indicates:  No Known Allergies      Current Facility-Administered Medications on File Prior to Encounter   Medication    [COMPLETED] acetaminophen tablet 1,000 mg    [COMPLETED] levETIRAcetam (KEPPRA) 2,000 mg in dextrose 5 % 250 mL IVPB    [COMPLETED] sodium chloride 0.9% bolus 1,000 mL     Current Outpatient Prescriptions on File Prior to Encounter   Medication Sig    levETIRAcetam (KEPPRA) 500 MG Tab Take 1 tablet (500 mg total) by mouth 2 (two) times daily.     Family History     None        Social History Main Topics    Smoking status: Former Smoker    Smokeless tobacco: Never Used    Alcohol use Yes    Drug use: No    Sexual activity: Not on file     Review of Systems   Constitutional: Negative.    HENT: Positive for dental problem (broke teeth last year) and nosebleeds.    Eyes: Negative.    Respiratory: Negative.    Cardiovascular: Negative.  Negative for chest pain.   Gastrointestinal: Negative.    Endocrine: Negative.    Genitourinary: Negative.         No incontinence   Musculoskeletal: Negative.  Negative for gait problem, neck pain and neck stiffness.   Skin: Positive for wound (nose wound).   Allergic/Immunologic: Negative.    Neurological: Positive for seizures. Negative for dizziness, tremors, syncope, facial asymmetry, speech difficulty, weakness, light-headedness, numbness and headaches.   Hematological: Negative.    Psychiatric/Behavioral: Negative.      Objective:     Vital Signs (Most Recent):  Temp: 98 °F (36.7 °C) (08/03/18 0844)  Pulse: 72 (08/03/18 0844)  Resp: 18 (08/03/18 0844)  BP: 108/67 (08/03/18 0844)  SpO2: 96 % (08/03/18 0844) Vital Signs (24h Range):  Temp:  [97.1 °F (36.2 °C)-98.4 °F (36.9 °C)] 98 °F (36.7 °C)  Pulse:  [58-99] 72  Resp:  [16-20] 18  SpO2:  [94 %-100 %] 96 %  BP: ()/(55-90) 108/67     Weight: 74.4 kg (164 lb 0.4 oz)  Body mass index is 23.53  kg/m².    Physical Exam   Constitutional: He is oriented to person, place, and time. He appears well-developed and well-nourished.   HENT:   Head: Normocephalic and atraumatic.   Right Ear: External ear normal.   Left Ear: External ear normal.   Eyes: Conjunctivae and EOM are normal. Pupils are equal, round, and reactive to light.   Neck: Normal range of motion. Neck supple.   Cardiovascular: Normal rate, regular rhythm and normal heart sounds.    Pulmonary/Chest: Effort normal and breath sounds normal.   Abdominal: Soft. Bowel sounds are normal. There is no tenderness.   Musculoskeletal: Normal range of motion. He exhibits no edema or deformity.   Neurological: He is alert and oriented to person, place, and time. He has a normal Finger-Nose-Finger Test. Gait normal.   Reflex Scores:       Tricep reflexes are 2+ on the right side and 2+ on the left side.       Bicep reflexes are 2+ on the right side and 2+ on the left side.       Brachioradialis reflexes are 2+ on the right side and 2+ on the left side.       Patellar reflexes are 2+ on the right side and 2+ on the left side.       Achilles reflexes are 2+ on the right side and 2+ on the left side.  Skin: Skin is warm and dry. Capillary refill takes less than 2 seconds.   Psychiatric: He has a normal mood and affect. His speech is normal and behavior is normal.       NEUROLOGICAL EXAMINATION:     MENTAL STATUS   Oriented to person, place, and time.   Attention: normal. Concentration: normal.   Speech: speech is normal   Level of consciousness: alert    CRANIAL NERVES     CN III, IV, VI   Pupils are equal, round, and reactive to light.  Extraocular motions are normal.   CN III: no CN III palsy  CN VI: no CN VI palsy  Nystagmus: none   Diplopia: none    CN V   Facial sensation intact.     CN VII   Facial expression full, symmetric.     CN VIII   CN VIII normal.     CN IX, X   CN IX normal.     CN XI   CN XI normal.     CN XII   CN XII normal.     MOTOR EXAM   Muscle  bulk: normal  Overall muscle tone: normal    Strength   Right neck flexion: 5/5  Left neck flexion: 5/5  Right neck extension: 5/5  Left neck extension: 5/5  Right deltoid: 5/5  Left deltoid: 5/5  Right biceps: 5/5  Left biceps: 5/5  Right triceps: 5/5  Left triceps: 5/5  Right wrist flexion: 5/5  Left wrist flexion: 5/5  Right wrist extension: 5/5  Left wrist extension: 5/5  Right interossei: 5/5  Left interossei: 5/5  Right abdominals: 5/5  Left abdominals: 5/5  Right iliopsoas: 5/5  Left iliopsoas: 5/5  Right quadriceps: 5/5  Left quadriceps: 5/5  Right hamstrin/5  Left hamstrin/5  Right glutei: 5/5  Left glutei: 5/5  Right anterior tibial: 5/5  Left anterior tibial: 5/5  Right posterior tibial: 5/5  Left posterior tibial: 5/5  Right peroneal: 5/5  Left peroneal: 5/5  Right gastroc: 5/5  Left gastroc: 5/5    REFLEXES     Reflexes   Right brachioradialis: 2+  Left brachioradialis: 2+  Right biceps: 2+  Left biceps: 2+  Right triceps: 2+  Left triceps: 2+  Right patellar: 2+  Left patellar: 2+  Right achilles: 2+  Left achilles: 2+  Right plantar: normal  Left plantar: normal    SENSORY EXAM   Light touch normal.     GAIT AND COORDINATION     Gait  Gait: normal     Coordination   Finger to nose coordination: normal    Tremor   Resting tremor: absent  Intention tremor: absent      Significant Labs:   Recent Lab Results       18  0450 18  2136 18  1704 18  1628 18  1623      Benzodiazepines    Negative      Methadone metabolites    Negative      Phencyclidine    Negative      Immature Granulocytes 0.4         Immature Grans (Abs) 0.03  Comment:  Mild elevation in immature granulocytes is non specific and   can be seen in a variety of conditions including stress response,   acute inflammation, trauma and pregnancy. Correlation with other   laboratory and clinical findings is essential.           Albumin          Alcohol, Medical, Serum          Alkaline Phosphatase          Allens  Test   N/A  N/A     ALT          Amphetamine Screen, Ur    Negative      Anion Gap 6(L) 5(L)        AST          Barbiturate Screen, Ur    Negative      Baso # 0.05         Basophil% 0.6         Total Bilirubin          Site   Other  Other     BUN, Bld 12 16        Calcium 8.9 8.6(L)        Chloride 108 107        CO2 26 26        Cocaine (Metab.)    Negative      Creatinine 0.8 0.9        Creatinine, Random Ur    44.0  Comment:  The random urine reference ranges provided were established   for 24 hour urine collections.  No reference ranges exist for  random urine specimens.  Correlate clinically.        Differential Method Automated         eGFR if  >60.0 >60.0        eGFR if non  >60.0  Comment:  Calculation used to obtain the estimated glomerular filtration  rate (eGFR) is the CKD-EPI equation.    >60.0  Comment:  Calculation used to obtain the estimated glomerular filtration  rate (eGFR) is the CKD-EPI equation.           Eos # 0.0         Eosinophil% 0.4         Glucose 79 114(H)        Gran # (ANC) 5.5         Gran% 68.0         Hematocrit 37.5(L)         Hemoglobin 12.8(L)         Lactate, Johnny  0.9  Comment:  Falsely low lactic acid results can be found in samples   containing >=13.0 mg/dL total bilirubin and/or >=3.5 mg/dL   direct bilirubin.          Lymph # 1.5         Lymph% 18.7         Magnesium 2.4         MCH 31.4(H)         MCHC 34.1         MCV 92         Mono # 1.0         Mono% 11.9         MPV 10.5         nRBC 0         Opiate Scrn, Ur    Negative      Phosphorus 3.3         Platelets 244         POC BE   0  -1     POC HCO3   25.2  24.5     POC Lactate   1.24       POC PCO2   40.5  42.3     POC PH   7.402  7.371     POC PO2   41  42     POC SATURATED O2   77(L)  76(L)     POC TCO2   26  26     POCT Glucose          Potassium 3.6 3.7        Total Protein          RBC 4.08(L)         RDW 13.2         Sample   VENOUS  VENOUS     Sodium 140 138        Marijuana (THC)  Metabolite    Negative      Toxicology Information    SEE COMMENT  Comment:  This screen includes the following classes of drugs at the   listed cut-off:  Benzodiazepines                  200 ng/ml  Methadone                        300 ng/ml  Cocaine metabolite               300 ng/ml  Opiates                          300 ng/ml  Barbiturates                     200 ng/ml  Amphetamines                    1000 ng/ml  Marijuana metabs (THC)            50 ng/ml  Phencyclidine (PCP)               25 ng/ml  High concentrations of Diphenhydramine may cross-react with  Phencyclidine PCP screening immunoassay giving a false   positive result.  High concentrations of Methylenedioxymethamphetamine (MDMA aka  Ectasy) and other structurally similar compounds may cross-   react with the Amphetamine/Methamphetamine screening   immunoassay giving a false positive result.  A metabolite of the anti-HIV drug Sustiva () may cause  false positive results in the Marijuana metabolite (THC)   screening assay.  Note: This exception list includes only more common   interferants in toxicology screen testing.  Because of many   cross-reactantspositive results on toxicology drug screens   should be confirmed whenever results do not correlate with   clinical presentation.  This report is intended for use in clinical monitoring and  management of patients. It is not intended for use in   employment related drug testing.  Because of any cross-reactants, positive results on toxicology  drug screens should be confirmed whenever results do not  correlate with clinical presentation.  Presumptive positive results are unconfirmed and may be used   only for medical purposes.        Troponin I  <0.006  Comment:  The reference interval for Troponin I represents the 99th percentile   cutoff   for our facility and is consistent with 3rd generation assay   performance.          WBC 8.14                     08/02/18  1557 08/02/18  1440 08/02/18  1432       Benzodiazepines        Methadone metabolites        Phencyclidine        Immature Granulocytes   0.4     Immature Grans (Abs)   0.03  Comment:  Mild elevation in immature granulocytes is non specific and   can be seen in a variety of conditions including stress response,   acute inflammation, trauma and pregnancy. Correlation with other   laboratory and clinical findings is essential.       Albumin   3.7     Alcohol, Medical, Serum <10       Alkaline Phosphatase   118     Allens Test        ALT   16     Amphetamine Screen, Ur        Anion Gap   19(H)     AST   18     Barbiturate Screen, Ur        Baso #   0.04     Basophil%   0.5     Total Bilirubin   0.4  Comment:  For infants and newborns, interpretation of results should be based  on gestational age, weight and in agreement with clinical  observations.  Premature Infant recommended reference ranges:  Up to 24 hours.............<8.0 mg/dL  Up to 48 hours............<12.0 mg/dL  3-5 days..................<15.0 mg/dL  6-29 days.................<15.0 mg/dL       Site        BUN, Bld   20     Calcium   8.9     Chloride   104     CO2   15(L)     Cocaine (Metab.)        Creatinine   1.0     Creatinine, Random Ur        Differential Method   Automated     eGFR if    >60.0     eGFR if non    >60.0  Comment:  Calculation used to obtain the estimated glomerular filtration  rate (eGFR) is the CKD-EPI equation.        Eos #   0.0     Eosinophil%   0.5     Glucose   106     Gran # (ANC)   6.4     Gran%   79.2(H)     Hematocrit   39.4(L)     Hemoglobin   13.0(L)     Lactate, Johnny   >12.0  Comment:  Falsely low lactic acid results can be found in samples   containing >=13.0 mg/dL total bilirubin and/or >=3.5 mg/dL   direct bilirubin.  *Critical value -  Results called to and read back by:MILLIE DEVLIN RN.  ()     Lymph #   0.9(L)     Lymph%   10.8(L)     Magnesium        MCH   30.8     MCHC   33.0     MCV   93     Mono #   0.7     Mono%    8.6     MPV   10.0     nRBC   0     Opiate Scrn, Ur        Phosphorus        Platelets   262     POC BE        POC HCO3        POC Lactate        POC PCO2        POC PH        POC PO2        POC SATURATED O2        POC TCO2        POCT Glucose  113(H)      Potassium   4.0     Total Protein   6.9     RBC   4.22(L)     RDW   13.2     Sample        Sodium   138     Marijuana (THC) Metabolite        Toxicology Information        Troponin I   0.010  Comment:  The reference interval for Troponin I represents the 99th percentile   cutoff   for our facility and is consistent with 3rd generation assay   performance.       WBC   8.07           Significant Imaging: CT: I have reviewed all pertinent results/findings within the past 24 hours and my personal findings are:  Negative for acute intracranial abnormality  I have reviewed and interpreted all pertinent imaging results/findings within the past 24 hours.    Assessment and Plan:     * Recurrent seizures    Patient had an unwitnessed seizure and a 22 min witnessed generalized tonic clonic seizure.  LA was elevated at initial ED encounter at 12 making a seizure probable.  The patient has a self reported history of a seizure disorder since 1978, and was taking AEDs from 1979 till the past February when he began to have more regular seizures.  Besides LA, most of the initial workup was negative including UDS and EtOH.  CT head showed no acute abnormality.  Received 2g Keppra in the ED and is now scheduled to keppra 500mg BID.    Recommendations  -- Spot EEG pending  -- Continue keppra  -- MRI Brain with and without contrast seizure protocol             VTE Risk Mitigation         Ordered     Place JAMES hose  Until discontinued      08/02/18 2124     Place sequential compression device  Until discontinued      08/02/18 2124     IP VTE LOW RISK PATIENT  Once      08/02/18 2124          Thank you for your consult. I will follow-up with patient. Please contact us if you have any  additional questions.    Ryne Tabares MD  Neurology  [facility]

## 2018-08-03 NOTE — SUBJECTIVE & OBJECTIVE
Past Medical History:   Diagnosis Date    Seizures        History reviewed. No pertinent surgical history.    Review of patient's allergies indicates:  No Known Allergies    Current Facility-Administered Medications on File Prior to Encounter   Medication    [COMPLETED] acetaminophen tablet 1,000 mg    [COMPLETED] levETIRAcetam (KEPPRA) 2,000 mg in dextrose 5 % 250 mL IVPB    [COMPLETED] sodium chloride 0.9% bolus 1,000 mL     Current Outpatient Prescriptions on File Prior to Encounter   Medication Sig    levETIRAcetam (KEPPRA) 500 MG Tab Take 1 tablet (500 mg total) by mouth 2 (two) times daily.     Family History     None        Social History Main Topics    Smoking status: Former Smoker    Smokeless tobacco: Never Used    Alcohol use Yes    Drug use: No    Sexual activity: Not on file     Review of Systems   Constitutional: Negative for activity change, appetite change, chills and fever.   HENT: Negative for congestion and rhinorrhea.    Eyes: Negative for photophobia and visual disturbance.   Respiratory: Negative for cough and shortness of breath.    Cardiovascular: Negative for chest pain and palpitations.   Gastrointestinal: Negative for abdominal pain, diarrhea, nausea and vomiting.   Genitourinary: Negative for difficulty urinating and dysuria.   Musculoskeletal: Positive for back pain (lower back). Negative for neck pain and neck stiffness.   Skin: Negative for rash and wound.   Neurological: Positive for seizures. Negative for dizziness, weakness and headaches.   Psychiatric/Behavioral: Negative for agitation and confusion.     Objective:     Vital Signs (Most Recent):  Temp: 98.3 °F (36.8 °C) (08/02/18 1859)  Pulse: (!) 58 (08/02/18 2203)  Resp: 16 (08/02/18 1859)  BP: 123/71 (08/02/18 2147)  SpO2: 97 % (08/02/18 2147) Vital Signs (24h Range):  Temp:  [98.3 °F (36.8 °C)-98.4 °F (36.9 °C)] 98.3 °F (36.8 °C)  Pulse:  [58-99] 58  Resp:  [16-19] 16  SpO2:  [94 %-100 %] 97 %  BP: ()/(55-90)  123/71        There is no height or weight on file to calculate BMI.    Physical Exam   Constitutional: He is oriented to person, place, and time. He appears well-developed and well-nourished.   Lethargic but arousable by voice   HENT:   Head: Normocephalic and atraumatic.   Dried blood to frontal tongue, superficial injury   Eyes: Conjunctivae and EOM are normal. Pupils are equal, round, and reactive to light.   Neck: Normal range of motion. Neck supple.   No TTP to neck, FROM   Cardiovascular: Normal rate, regular rhythm and normal heart sounds.    Pulmonary/Chest: Effort normal and breath sounds normal. No respiratory distress. He has no wheezes.   Abdominal: Soft. Bowel sounds are normal. He exhibits no distension. There is no tenderness. There is no guarding.   Musculoskeletal: Normal range of motion. He exhibits no edema or tenderness.   Finger-nose coordination intact  Strength grossly intact to extremities   Neurological: He is alert and oriented to person, place, and time.   Skin: Skin is warm and dry.   Psychiatric: He has a normal mood and affect. Judgment and thought content normal. He is slowed. He is inattentive.   Nursing note and vitals reviewed.        CRANIAL NERVES     CN III, IV, VI   Pupils are equal, round, and reactive to light.  Extraocular motions are normal.        Significant Labs:   CBC:   Recent Labs  Lab 08/02/18  1432   WBC 8.07   HGB 13.0*   HCT 39.4*        CMP:   Recent Labs  Lab 08/02/18  1432 08/02/18  2136    138   K 4.0 3.7    107   CO2 15* 26    114*   BUN 20 16   CREATININE 1.0 0.9   CALCIUM 8.9 8.6*   PROT 6.9  --    ALBUMIN 3.7  --    BILITOT 0.4  --    ALKPHOS 118  --    AST 18  --    ALT 16  --    ANIONGAP 19* 5*   EGFRNONAA >60.0 >60.0     Lactic Acid:   Recent Labs  Lab 08/02/18  1432 08/02/18  2136   LACTATE >12.0* 0.9       Significant Imaging: I have reviewed all pertinent imaging results/findings within the past 24 hours.   Ct Head Without  Contrast    Result Date: 8/2/2018  EXAMINATION: CT HEAD WITHOUT CONTRAST CLINICAL HISTORY: Seizures new or progressive; TECHNIQUE: Low dose axial images were obtained through the head.  Coronal and sagittal reformations were also performed. Contrast was not administered. COMPARISON: None. FINDINGS: The brain parenchyma appears normal for age with good corticomedullary differentiation.  There is no evidence of acute infarct, hemorrhage, or mass.  The ventricular system is normal in size.  No mass-effect or midline shift.  There are no abnormal extra-axial fluid collections.  The paranasal sinuses and mastoid air cells are clear.  The calvarium appears intact.  .     No acute intracranial abnormalities

## 2018-08-03 NOTE — PROCEDURES
EXTENDED  ELECTROENCEPHALOGRAM  REPORT    Terrell Yusuf  [id number]  1960    DATE OF SERVICE: 8/3/18    DATE OF ADMISSION: 8/2/2018  7:06 PM    ADMITTING/REQUESTING PROVIDER:  Shon Armstrong MD    REASON FOR CONSULT: 59yo M with hx of seizures    MEDICATIONS:   Current Facility-Administered Medications   Medication    acetaminophen tablet 650 mg    albuterol-ipratropium 2.5 mg-0.5 mg/3 mL nebulizer solution 3 mL    dextrose 50% injection 12.5 g    dextrose 50% injection 25 g    glucagon (human recombinant) injection 1 mg    glucose chewable tablet 16 g    glucose chewable tablet 24 g    ketorolac injection 15 mg    levETIRAcetam tablet 500 mg    ondansetron disintegrating tablet 4 mg    promethazine tablet 25 mg    ramelteon tablet 8 mg    senna-docusate 8.6-50 mg per tablet 1 tablet    sodium chloride 0.9% flush 5 mL     METHODOLOGY   Electroencephalographic (EEG) recording is with electrodes placed according to the International 10-20 placement system.  Thirty two (32) channels of digital signal (sampling rate of 512/sec) including T1 and T2 was simultaneously recorded from the scalp and may include  EKG, EMG, and/or eye monitors.  Recording band pass was 0.1 to 512 hz.  Digital video recording of the patient is simultaneously recorded with the EEG.  The patient is instructed report clinical symptoms which may occur during the recording session.  EEG and video recording is stored and archived in digital format.  Activation procedures which include photic stimulation, hyperventilation and instructing patients to perform simple task are done in selected patients.   The EEG is displayed on a monitor screen and can be reviewed using different montages.  Computer assisted analysis is employed to detect spike and electrographic seizure activity.   The entire record is submitted for computer analysis.  The entire recording is visually reviewed and the times identified by computer analysis as being  spikes or seizures are reviewed again.  Compresses spectral analysis (CSA) is also performed on the activity recorded from each individual channel.  This is displayed as a power display of frequencies from 0 to 30 Hz over time.   The CSA is reviewed looking for asymmetries in power between homologous areas of the scalp and then compared with the original EEG recording.     Vortal software was also utilized in the review of this study.  This software suite analyzes the EEG recording in multiple domains.  Coherence and rhythmicity is computed to identify EEG sections which may contain organized seizures.  Each channel undergoes analysis to detect presence of spike and sharp waves which have special and morphological characteristic of epileptic activity.  The routine EEG recording is converted from spacial into frequency domain.  This is then displayed comparing homologous areas to identify areas of significant asymmetry.  Algorithm to identify non-cortically generated artifact is used to separate eye movement, EMG and other artifact from the EEG.      RECORDING TIMES  Start on 8/3/18, 11:28  Stop on 8/3/18, 12:54    A total of 1 hour and 26 minutes of EEG recording was obtained.    EEG FINGINGS  Background activity:   The background rhythm was characterized by theta-alpha (6-8 Hz) activity with a 8 Hz posterior dominant alpha rhythm at 30-70 microvolts.   Symmetry and continuity: the background was continuous; asymmetry with left sided slowing (delta-theta 3-5 Hz) noted    Sleep:    Not seen.    Activation procedures:   Not done    Abnormal activity:   Intermittent left sided runs of epileptiform (spike/wave) activity noted, maximal T1 (T1>T3>F7)  No electrographic seizures seen.    IMPRESSION:   This is an abnormal extended EEG (1 hour and 26 minutes) due to:  1) left sided epileptiform discharges seen, suggestive of underlying epileptiform potential  2) asymmetry with left sided slowing seen, suggestive of  underlying structural lesion  3) mild to moderate generalized slowing seen, suggestive of moderate diffuse or multifocal cerebral dysfunction.    No electrographic seizures seen.    CLINICAL CORRELATION IS RECOMMENDED    Faina Acuna MD, CHERYL(), RJ WOOD.  Neurology-Epilepsy.  [facility].

## 2018-08-03 NOTE — SUBJECTIVE & OBJECTIVE
"Interval History: Patient resting in bed, states that he is unsure why his seizures are now assoiated with tonic-clonic activity as they were previously episodes of "staring off." He states that he lives in a tent under the [facility] bridge and receives $700/month from social security. He states that he cannot afford his medication. He is not interested in going to a shelter as "Anders protects him in his tent."    Review of Systems   Constitutional: Negative for activity change, appetite change, chills and fever.   HENT: Negative for congestion and rhinorrhea.    Eyes: Negative for photophobia and visual disturbance.   Respiratory: Negative for cough and shortness of breath.    Cardiovascular: Negative for chest pain and palpitations.   Gastrointestinal: Negative for abdominal pain, diarrhea, nausea and vomiting.   Genitourinary: Negative for difficulty urinating and dysuria.   Musculoskeletal: Positive for back pain (lower back). Negative for neck pain and neck stiffness.   Skin: Negative for rash and wound.   Neurological: Positive for seizures. Negative for dizziness, weakness and headaches.   Psychiatric/Behavioral: Negative for agitation and confusion.     Objective:     Vital Signs (Most Recent):  Temp: 98 °F (36.7 °C) (08/03/18 1630)  Pulse: 65 (08/03/18 1630)  Resp: 18 (08/03/18 1630)  BP: 102/65 (08/03/18 1630)  SpO2: (!) 94 % (08/03/18 1630) Vital Signs (24h Range):  Temp:  [97.1 °F (36.2 °C)-99 °F (37.2 °C)] 98 °F (36.7 °C)  Pulse:  [58-87] 65  Resp:  [16-20] 18  SpO2:  [93 %-98 %] 94 %  BP: ()/(55-84) 102/65     Weight: 74.4 kg (164 lb 0.4 oz)  Body mass index is 23.53 kg/m².    Intake/Output Summary (Last 24 hours) at 08/03/18 1651  Last data filed at 08/03/18 1300   Gross per 24 hour   Intake              240 ml   Output             1325 ml   Net            -1085 ml      Physical Exam   Constitutional: He is oriented to person, place, and time. He appears well-developed and well-nourished.   HENT: "   Head: Normocephalic and atraumatic.   Right Ear: External ear normal.   Left Ear: External ear normal.   Eyes: Conjunctivae and EOM are normal. Pupils are equal, round, and reactive to light.   Neck: Normal range of motion. Neck supple.   Cardiovascular: Normal rate, regular rhythm and normal heart sounds.    Pulmonary/Chest: Effort normal and breath sounds normal.   Abdominal: Soft. Bowel sounds are normal. There is no tenderness.   Musculoskeletal: Normal range of motion. He exhibits no edema or deformity.   Neurological: He is alert and oriented to person, place, and time.   Skin: Skin is warm and dry. Capillary refill takes less than 2 seconds.   Psychiatric: He has a normal mood and affect. His speech is normal and behavior is normal.       Significant Labs: All pertinent labs within the past 24 hours have been reviewed.    Significant Imaging: I have reviewed all pertinent imaging results/findings within the past 24 hours.

## 2018-08-03 NOTE — ASSESSMENT & PLAN NOTE
Patient had an unwitnessed seizure and a 22 min witnessed generalized tonic clonic seizure.  LA was elevated at initial ED encounter at 12 making a seizure probable.  The patient has a self reported history of a seizure disorder since 1978, and was taking AEDs from 1979 till the past February when he began to have more regular seizures.  Besides LA, most of the initial workup was negative including UDS and EtOH.  CT head showed no acute abnormality.  Received 2g Keppra in the ED and is now scheduled to keppra 500mg BID.    Recommendations  -- Spot EEG pending  -- Continue keppra  -- MRI Brain with and without contrast seizure protocol

## 2018-08-03 NOTE — ED NOTES
Patient identifiers for Terrell Yusuf checked and correct.  LOC: Patient is awake, alert, and aware of environment with an appropriate affect. Patient is oriented x 3 and speaking appropriately.  APPEARANCE: Patient resting comfortably and in no acute distress. Patient is clean and well groomed, patient's clothing is properly fastened.  SKIN: The skin is warm and dry. Patient has normal skin turgor and moist mucus membrances. Skin is intact; no bruising or breakdown noted.  MUSKULOSKELETAL: Patient is moving all extremities well, no obvious deformities noted. Pulses intact.   RESPIRATORY: Airway is open and patent. Respirations are spontaneous and non-labored with normal effort and rate.  CARDIAC: Patient has a normal rate and rhythm. No peripheral edema noted. Capillary refill < 3 seconds.  ABDOMEN: No distention noted. Bowel sounds active in all 4 quadrants. Soft and non-tender upon palpation.  NEUROLOGICAL: PERRL. Facial expression is symmetrical. Hand grasps are equal bilaterally. Normal sensation in all extremities when touched with finger.  Allergies reported: Review of patient's allergies indicates:  No Known Allergies

## 2018-08-03 NOTE — PLAN OF CARE
Problem: Fall Risk (Adult)  Goal: Identify Related Risk Factors and Signs and Symptoms  Related risk factors and signs and symptoms are identified upon initiation of Human Response Clinical Practice Guideline (CPG)   Outcome: Ongoing (interventions implemented as appropriate)   08/03/18 1508   Fall Risk   Related Risk Factors (Fall Risk) gait/mobility problems;history of falls       Problem: Patient Care Overview  Goal: Plan of Care Review  Outcome: Ongoing (interventions implemented as appropriate)  POC reviewed with patient. Patient verbalizes understanding; reinforcement may be needed. EEG initiated and completed; patient tolerated well. UA sent to Lab. Patient compliant with medication administration. Safety precautions in place; call light within reach, bed in low position, wheels locked, side rails up x2. Will continue to monitor.    Problem: Seizure Disorder/Epilepsy (Adult)  Goal: Signs and Symptoms of Listed Potential Problems Will be Absent, Minimized or Managed (Seizure Disorder/Epilepsy)  Signs and symptoms of listed potential problems will be absent, minimized or managed by discharge/transition of care (reference Seizure Disorder/Epilepsy (Adult) CPG).   Outcome: Ongoing (interventions implemented as appropriate)   08/03/18 1508   Seizure Disorder/Epilepsy   Problems Assessed (Seizure Disorder/Epilepsy) other (see comments)  (Hx of seizures.)   Problems Present (Seizure Disorder/Epilepsy) none

## 2018-08-03 NOTE — ASSESSMENT & PLAN NOTE
Reports noncompliance with medications d/t money issues.  Also states that Anders told him to stop taking his medications, but was postictal at the time.  May need outpatient psych evaluation.  - Need to reinforce taking medications regularly.  - explained risks of not taking keppra

## 2018-08-03 NOTE — PROGRESS NOTES
"[facility] Hospital Medicine  Progress Note    Patient Name: Terrell Yusuf  MRN: [id number]  Patient Class: IP- Inpatient   Admission Date: 8/2/2018  Length of Stay: 0 days  Attending Physician: Mahnaz Obrien MD  Primary Care Provider: Scar Whitfield MD    [facility] Medicine Team: [facility] MED E Robe Lewis PA-C    Subjective:     Principal Problem:Recurrent seizures    HPI:  This is a 58-year-old male with a pmhx of seizures, currently homeless that was evaluated earlier in the ER for seizure activity.  The [place] brought to the ER after suspected witnessed seizure activity, but upon arrival was AAOx3. Per ED, he did experience an episode of 22 min generalized tonic-clonic activity in front of the previous ER attending, that ultimately self-resolved.  The patient's initial lactic acid was 12, confirming the episode of seizure.  Patient's other labs were grossly within normal limits including a negative UDS and normal alcohol level.  After the patient received 2 g of IV Keppra and was observed for several hours and continued to remain AAO x3 was allowed to be discharged home.  While in the waiting room in a wheelchair he experienced subsequent episode of witnessed generalized shaking, and was brought back into the ER.      Patient has been unable to afford his seizure medications and is currently homeless.  Also states that "Anders told me to stop taking my medications".  Would not elaborate further.  Patient postictal during my evaluation but oriented and appropriate otherwise.  Pt reports a hx of sezires for several years, and previously seen at [facility] for management.  He stopped taking his medications in February.  He experiences x1 seizure/year and and tonic-clinic in nature.  Pt denies B/B incontinence and saddle paresthesias, but reports tongue biting this evening.  Denies any known triggers, and denies ETOH, drug, smoking use.  Denies hx " "of MI, CVA or any other pmhx.  Pt denies HA, dizziness, weakness, f/c abd pain, CP, SOB.      In ED, afebrile, VSS, no leukocytosis. Acidotic with CO2 15, AG 19. Initial LA >12, given 1L of NS.  Repeat LA 0.9. Initial trop .010. CTH without acute abnormalities. EKG shows SR with 1st degree AVB (no previous to compare).    Hospital Course:  Terrell Yusuf was admitted for multiple seizures within a 24 hour period with a change in type seizure. Neurology consulted on admit. EEG and MRI pending.     Interval History: Patient resting in bed, states that he is unsure why his seizures are now assoiated with tonic-clonic activity as they were previously episodes of "staring off." He states that he lives in a tent under the [employer] bridge and receives $700/month from social security. He states that he cannot afford his medication. He is not interested in going to a shelter as "Anders protects him in his tent."    Review of Systems   Constitutional: Negative for activity change, appetite change, chills and fever.   HENT: Negative for congestion and rhinorrhea.    Eyes: Negative for photophobia and visual disturbance.   Respiratory: Negative for cough and shortness of breath.    Cardiovascular: Negative for chest pain and palpitations.   Gastrointestinal: Negative for abdominal pain, diarrhea, nausea and vomiting.   Genitourinary: Negative for difficulty urinating and dysuria.   Musculoskeletal: Positive for back pain (lower back). Negative for neck pain and neck stiffness.   Skin: Negative for rash and wound.   Neurological: Positive for seizures. Negative for dizziness, weakness and headaches.   Psychiatric/Behavioral: Negative for agitation and confusion.     Objective:     Vital Signs (Most Recent):  Temp: 98 °F (36.7 °C) (08/03/18 1630)  Pulse: 65 (08/03/18 1630)  Resp: 18 (08/03/18 1630)  BP: 102/65 (08/03/18 1630)  SpO2: (!) 94 % (08/03/18 1630) Vital Signs (24h Range):  Temp:  [97.1 °F (36.2 °C)-99 °F (37.2 °C)] 98 °F " (36.7 °C)  Pulse:  [58-87] 65  Resp:  [16-20] 18  SpO2:  [93 %-98 %] 94 %  BP: ()/(55-84) 102/65     Weight: 74.4 kg (164 lb 0.4 oz)  Body mass index is 23.53 kg/m².    Intake/Output Summary (Last 24 hours) at 08/03/18 1651  Last data filed at 08/03/18 1300   Gross per 24 hour   Intake              240 ml   Output             1325 ml   Net            -1085 ml      Physical Exam   Constitutional: He is oriented to person, place, and time. He appears well-developed and well-nourished.   HENT:   Head: Normocephalic and atraumatic.   Right Ear: External ear normal.   Left Ear: External ear normal.   Eyes: Conjunctivae and EOM are normal. Pupils are equal, round, and reactive to light.   Neck: Normal range of motion. Neck supple.   Cardiovascular: Normal rate, regular rhythm and normal heart sounds.    Pulmonary/Chest: Effort normal and breath sounds normal.   Abdominal: Soft. Bowel sounds are normal. There is no tenderness.   Musculoskeletal: Normal range of motion. He exhibits no edema or deformity.   Neurological: He is alert and oriented to person, place, and time.   Skin: Skin is warm and dry. Capillary refill takes less than 2 seconds.   Psychiatric: He has a normal mood and affect. His speech is normal and behavior is normal.       Significant Labs: All pertinent labs within the past 24 hours have been reviewed.    Significant Imaging: I have reviewed all pertinent imaging results/findings within the past 24 hours.    Assessment/Plan:      * Recurrent seizures    58-year-old male with a pmhx of seizures, currently homeless that was evaluated earlier in the ER for seizure activity which re-occurred in the waiting room s/p keppra loading.    - noncompliant with keppra  - c/w keppra 500 mg PO BID s/p 2g keppra loading  - keppra level pending   - EEG >1 hour ordered  - Neurology consulted and appreciate recs  - CTH without acute abnormalities.  - afebrile, VSS, no leukocytosis.   - Initially acidotic with CO2  15, AG 19- resolved with IVF.   - Initial LA >12, given 1L of NS.  Repeat LA 0.9.   - Troponins flat x2. EKG shows SR with 1st degree AVB (no previous to compare).  - ETOH negative, UDS negative  - UA -  - seizure precautions, fall precautions, neuro checks Q4 hours        Homeless    - homeless x1.5 years  - will need assistance from  for f/u; has medicaid        Noncompliance with medications    Reports noncompliance with medications d/t money issues.  Also states that Anders told him to stop taking his medications, but was postictal at the time.  May need outpatient psych evaluation.  - Need to reinforce taking medications regularly.  - explained risks of not taking keppra          VTE Risk Mitigation         Ordered     Place JAMES hose  Until discontinued      08/02/18 2124     Place sequential compression device  Until discontinued      08/02/18 2124     IP VTE LOW RISK PATIENT  Once      08/02/18 2124              Robe Lewis PA-C  Department of Hospital Medicine   [facility]

## 2018-08-03 NOTE — ED TRIAGE NOTES
Pt with history of seizures and was seen here earlier today for seizures. Pt had seizure in waiting room and is postictal at present. Per MD pt was seen earlier and d/c but will probably be admitted for observation.

## 2018-08-03 NOTE — HOSPITAL COURSE
Terrell Yusuf was admitted for multiple seizures within a 24 hour period with a change in type seizure. Neurology consulted on admit and recommend increasing Keppra to 1000mg BID. EEG with left sided epileptiform discharges seen, suggestive of underlying epileptiform potential, asymmetry with left sided slowing seen, suggestive of underlying structural lesion and mild to moderate generalized slowing seen, suggestive of moderate diffuse or multifocal cerebral dysfunction. MRI brain (epilepsy protocol) ordered and reviewed per neurology who deemed patient appropriate for discharge with follow-up in 4 weeks. Transportation set up via .     Imaging:  Ct Head Without Contrast    Result Date: 8/2/2018  EXAMINATION: CT HEAD WITHOUT CONTRAST CLINICAL HISTORY: Seizures new or progressive; TECHNIQUE: Low dose axial images were obtained through the head.  Coronal and sagittal reformations were also performed. Contrast was not administered. COMPARISON: None. FINDINGS: The brain parenchyma appears normal for age with good corticomedullary differentiation.  There is no evidence of acute infarct, hemorrhage, or mass.  The ventricular system is normal in size.  No mass-effect or midline shift.  There are no abnormal extra-axial fluid collections.  The paranasal sinuses and mastoid air cells are clear.  The calvarium appears intact.  .     No acute intracranial abnormalities Electronically signed by: Zafar Ibarra MD Date:    08/02/2018 Time:    19:32    Mri Brain Epilepsy W W/o Contrast    Result Date: 8/5/2018  EXAMINATION: MRI BRAIN EPILEPSY W W/O CONTRAST CLINICAL HISTORY: Seizures new or progressive; TECHNIQUE: Multiplanar multisequence MR imaging of the brain was performed before and after the administration of 8 mL Gadavist intravenous contrast. Additional thin cut images were performed through the hippocampi per epilepsy protocol. COMPARISON: Head  FINDINGS: Intracranial Compartment: Ventricles and sulci are  normal in size for age without evidence of hydrocephalus. No extra-axial blood or fluid collections. Numerous punctate T2/FLAIR hyperintense foci in the supratentorial white matter, likely chronic microvascular ischemic change. 6 mm ill-defined blush like focus of enhancement in the posterior right temporal lobe (sequence 20 image 11).  There is no corresponding mass effect or edema.  Subtle susceptibility artifact at this site on the SWI images. No parenchymal mass or acute hemorrhage.  No recent or remote major vascular distribution infarct.  No neuronal migrational or cortical organizational abnormality identified.  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  Right hippocampus appears within normal limits. Normal vascular flow voids are preserved. Skull/Extracranial Contents (limited evaluation): Bone marrow signal intensity is normal.     Examination mildly degraded by patient motion artifact. 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 6 mm enhancing lesion in the subcortical white matter of the posterior right temporal lobe as above.  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  Comparison with prior imaging suggested; if unavailable a follow-up study may be warranted to confirm stability in light of atypical location. Mild chronic microvascular ischemic change.

## 2018-08-03 NOTE — ED NOTES
Telemetry Verification   Patient placed on Telemetry Box  Verified with War Room  Box # [id number]   Monitor Tech Tati   Rate 58   Rhythm Sinus Guero

## 2018-08-03 NOTE — ASSESSMENT & PLAN NOTE
58-year-old male with a pmhx of seizures, currently homeless that was evaluated earlier in the ER for seizure activity which re-occurred in the waiting room s/p keppra loading.    - noncompliant with keppra  - c/w keppra 500 mg PO BID s/p 2g keppra loading  - keppra level pending   - EEG >1 hour ordered  - Neurology consulted and appreciate recs  - CTH without acute abnormalities.  - afebrile, VSS, no leukocytosis.   - Initially acidotic with CO2 15, AG 19- resolved with IVF.   - Initial LA >12, given 1L of NS.  Repeat LA 0.9.   - Troponins flat x2. EKG shows SR with 1st degree AVB (no previous to compare).  - ETOH negative, UDS negative  - UA -  - seizure precautions, fall precautions, neuro checks Q4 hours

## 2018-08-03 NOTE — PLAN OF CARE
"   08/03/18 0905   Discharge Assessment   Assessment Type Discharge Planning Assessment   Confirmed/corrected address and phone number on facesheet? Yes   Assessment information obtained from? Patient   Expected Length of Stay (days) 2   Communicated expected length of stay with patient/caregiver yes   Prior to hospitilization cognitive status: Alert/Oriented   Prior to hospitalization functional status: Independent   Current cognitive status: Alert/Oriented   Current Functional Status: Independent   Lives With alone   Able to Return to Prior Arrangements yes   Is patient able to care for self after discharge? Yes   Patient's perception of discharge disposition homeless   Readmission Within The Last 30 Days no previous admission in last 30 days   Patient currently being followed by outpatient case management? No   Patient currently receives any other outside agency services? No   Equipment Currently Used at Home none   Do you have any problems affording any of your prescribed medications? Yes   If yes, what medications? pt does not have money for seizure meds but also stopped taking them in February because "Anders told me to stop taking my medications".     Is the patient taking medications as prescribed? no   If no, which medications is patient not taking? see above   Does the patient have transportation home? No   Does the patient receive services at the [facility]? No   Discharge Plan A Other  (pt lives in a tent under the [facility] [place] [facility])   Discharge Plan B Other  (pt refused shelter)   Patient/Family In Agreement With Plan yes     Dx: recurrent seizures  Consult: neuro  Pharm: [facility]  Hosp f/u appt: Dr. Scar Whitfield (PCP at [facility]) on 8/10/18 at 1000    Patient scheduled to have an EEG done today.  "

## 2018-08-03 NOTE — ASSESSMENT & PLAN NOTE
58-year-old male with a pmhx of seizures, currently homeless that was evaluated earlier in the ER for seizure activity which re-occurred in the waiting room s/p keppra loading.    - noncompliant with keppra  - c/w keppra 500 mg PO BID s/p 2g keppra loading  - check keppra level in AM  - EEG >1 hour ordered  - Neurology consulted and appreciate recs  - CTH without acute abnormalities.  - afebrile, VSS, no leukocytosis.   - Initially acidotic with CO2 15, AG 19- resolved with IVF.   - Initial LA >12, given 1L of NS.  Repeat LA 0.9.   - Troponins flat x2. EKG shows SR with 1st degree AVB (no previous to compare).  - ETOH negative, UDS negative  - UA pending  - seizure precautions, fall precautions, neuro checks Q4 hours

## 2018-08-03 NOTE — ED NOTES
Pt stood up with steady gait to use urinal with this RN at bedside. Pt back in bed with no event of fall.

## 2018-08-03 NOTE — HPI
"This is a 58-year-old male with a pmhx of seizures, currently homeless that was evaluated earlier in the ER for seizure activity.  [place] brought to the ER after suspected witnessed seizure activity, but upon arrival was AAOx3. Per ED, he did experience an episode of 22 min generalized tonic-clonic activity in front of the previous ER attending, that ultimately self-resolved.  The patient's initial lactic acid was 12, confirming the episode of seizure.  Patient's other labs were grossly within normal limits including a negative UDS and normal alcohol level.  After the patient received 2 g of IV Keppra and was observed for several hours and continued to remain AAO x3 was allowed to be discharged home.  While in the waiting room in a wheelchair he experienced subsequent episode of witnessed generalized shaking, and was brought back into the ER.      Patient has been unable to afford his seizure medications and is currently homeless.  Also states that "Anders told me to stop taking my medications".  Would not elaborate further.  Patient postictal during my evaluation but oriented and appropriate otherwise.  Pt reports a hx of sezires for several years, and previously seen at [facility] for management.  He stopped taking his medications in February.  He experiences x1 seizure/year and and tonic-clinic in nature.  Pt denies B/B incontinence and saddle paresthesias, but reports tongue biting this evening.  Denies any known triggers, and denies ETOH, drug, smoking use.  Denies hx of MI, CVA or any other pmhx.  Pt denies HA, dizziness, weakness, f/c abd pain, CP, SOB.      In ED, afebrile, VSS, no leukocytosis. Acidotic with CO2 15, AG 19. Initial LA >12, given 1L of NS.  Repeat LA 0.9. Initial trop .010. CTH without acute abnormalities. EKG shows SR with 1st degree AVB (no previous to compare).  "

## 2018-08-04 LAB
ANION GAP SERPL CALC-SCNC: 9 MMOL/L
BASOPHILS # BLD AUTO: 0.05 K/UL
BASOPHILS NFR BLD: 0.7 %
BUN SERPL-MCNC: 12 MG/DL
CALCIUM SERPL-MCNC: 8.9 MG/DL
CHLORIDE SERPL-SCNC: 105 MMOL/L
CO2 SERPL-SCNC: 24 MMOL/L
CREAT SERPL-MCNC: 0.9 MG/DL
DIFFERENTIAL METHOD: ABNORMAL
EOSINOPHIL # BLD AUTO: 0.1 K/UL
EOSINOPHIL NFR BLD: 1 %
ERYTHROCYTE [DISTWIDTH] IN BLOOD BY AUTOMATED COUNT: 13.2 %
EST. GFR  (AFRICAN AMERICAN): >60 ML/MIN/1.73 M^2
EST. GFR  (NON AFRICAN AMERICAN): >60 ML/MIN/1.73 M^2
GLUCOSE SERPL-MCNC: 80 MG/DL
HCT VFR BLD AUTO: 36.5 %
HGB BLD-MCNC: 12.1 G/DL
IMM GRANULOCYTES # BLD AUTO: 0.01 K/UL
IMM GRANULOCYTES NFR BLD AUTO: 0.1 %
LEVETIRACETAM SERPL-MCNC: 15 UG/ML (ref 3–60)
LYMPHOCYTES # BLD AUTO: 1.9 K/UL
LYMPHOCYTES NFR BLD: 27.7 %
MCH RBC QN AUTO: 30.6 PG
MCHC RBC AUTO-ENTMCNC: 33.2 G/DL
MCV RBC AUTO: 92 FL
MONOCYTES # BLD AUTO: 0.8 K/UL
MONOCYTES NFR BLD: 11.1 %
NEUTROPHILS # BLD AUTO: 4 K/UL
NEUTROPHILS NFR BLD: 59.4 %
NRBC BLD-RTO: 0 /100 WBC
PLATELET # BLD AUTO: 230 K/UL
PMV BLD AUTO: 10.2 FL
POTASSIUM SERPL-SCNC: 3.8 MMOL/L
RBC # BLD AUTO: 3.95 M/UL
SODIUM SERPL-SCNC: 138 MMOL/L
WBC # BLD AUTO: 6.74 K/UL

## 2018-08-04 PROCEDURE — 36415 COLL VENOUS BLD VENIPUNCTURE: CPT

## 2018-08-04 PROCEDURE — 99232 SBSQ HOSP IP/OBS MODERATE 35: CPT | Mod: ,,, | Performed by: PHYSICIAN ASSISTANT

## 2018-08-04 PROCEDURE — 80048 BASIC METABOLIC PNL TOTAL CA: CPT

## 2018-08-04 PROCEDURE — 85025 COMPLETE CBC W/AUTO DIFF WBC: CPT

## 2018-08-04 PROCEDURE — 99232 SBSQ HOSP IP/OBS MODERATE 35: CPT | Mod: ,,, | Performed by: PSYCHIATRY & NEUROLOGY

## 2018-08-04 PROCEDURE — A9585 GADOBUTROL INJECTION: HCPCS | Performed by: HOSPITALIST

## 2018-08-04 PROCEDURE — 94761 N-INVAS EAR/PLS OXIMETRY MLT: CPT

## 2018-08-04 PROCEDURE — 11000001 HC ACUTE MED/SURG PRIVATE ROOM

## 2018-08-04 PROCEDURE — 25000003 PHARM REV CODE 250: Performed by: PHYSICIAN ASSISTANT

## 2018-08-04 PROCEDURE — 25500020 PHARM REV CODE 255: Performed by: HOSPITALIST

## 2018-08-04 RX ORDER — GADOBUTROL 604.72 MG/ML
8 INJECTION INTRAVENOUS
Status: COMPLETED | OUTPATIENT
Start: 2018-08-04 | End: 2018-08-04

## 2018-08-04 RX ADMIN — GADOBUTROL 8 ML: 604.72 INJECTION INTRAVENOUS at 06:08

## 2018-08-04 RX ADMIN — LEVETIRACETAM 1000 MG: 500 TABLET ORAL at 09:08

## 2018-08-04 RX ADMIN — BENZOCAINE: 100 GEL TOPICAL at 10:08

## 2018-08-04 RX ADMIN — ACETAMINOPHEN 650 MG: 325 TABLET ORAL at 09:08

## 2018-08-04 RX ADMIN — RAMELTEON 8 MG: 8 TABLET, FILM COATED ORAL at 10:08

## 2018-08-04 NOTE — SUBJECTIVE & OBJECTIVE
Interval History: No reported seizures since admit. Patient resting comfortably in bed. Plan of care discussed, patient verbalized understanding.    Review of Systems   Constitutional: Negative for activity change, appetite change, chills and fever.   HENT: Negative for congestion and rhinorrhea.    Eyes: Negative for photophobia and visual disturbance.   Respiratory: Negative for cough and shortness of breath.    Cardiovascular: Negative for chest pain and palpitations.   Gastrointestinal: Negative for abdominal pain, diarrhea, nausea and vomiting.   Genitourinary: Negative for difficulty urinating and dysuria.   Musculoskeletal: Positive for back pain (lower back). Negative for neck pain and neck stiffness.   Skin: Negative for rash and wound.   Neurological: Positive for seizures. Negative for dizziness, weakness and headaches.   Psychiatric/Behavioral: Negative for agitation and confusion.     Objective:     Vital Signs (Most Recent):  Temp: 98.2 °F (36.8 °C) (08/04/18 1156)  Pulse: 75 (08/04/18 1156)  Resp: 20 (08/04/18 1156)  BP: (!) 104/58 (08/04/18 1156)  SpO2: 96 % (08/04/18 1156) Vital Signs (24h Range):  Temp:  [97.2 °F (36.2 °C)-98.3 °F (36.8 °C)] 98.2 °F (36.8 °C)  Pulse:  [64-75] 75  Resp:  [14-20] 20  SpO2:  [93 %-97 %] 96 %  BP: (102-112)/(58-88) 104/58     Weight: 74.4 kg (164 lb 0.4 oz)  Body mass index is 23.53 kg/m².    Intake/Output Summary (Last 24 hours) at 08/04/18 1354  Last data filed at 08/04/18 0900   Gross per 24 hour   Intake              480 ml   Output              800 ml   Net             -320 ml      Physical Exam   Constitutional: He is oriented to person, place, and time. He appears well-developed and well-nourished.   HENT:   Head: Normocephalic and atraumatic.   Right Ear: External ear normal.   Left Ear: External ear normal.   Exterior nasal abrasions   Eyes: Conjunctivae and EOM are normal. Pupils are equal, round, and reactive to light.   Neck: Normal range of motion. Neck  supple.   Cardiovascular: Normal rate, regular rhythm and normal heart sounds.    Pulmonary/Chest: Effort normal and breath sounds normal.   Abdominal: Soft. Bowel sounds are normal. There is no tenderness.   Musculoskeletal: Normal range of motion. He exhibits no edema or deformity.   Neurological: He is alert and oriented to person, place, and time.   Skin: Skin is warm and dry. Capillary refill takes less than 2 seconds.   Psychiatric: He has a normal mood and affect. His speech is normal and behavior is normal.   Nursing note and vitals reviewed.      Significant Labs: All pertinent labs within the past 24 hours have been reviewed.    Significant Imaging: I have reviewed all pertinent imaging results/findings within the past 24 hours.

## 2018-08-04 NOTE — PLAN OF CARE
Problem: Fall Risk (Adult)  Goal: Absence of Falls  Patient will demonstrate the desired outcomes by discharge/transition of care.   Outcome: Ongoing (interventions implemented as appropriate)   08/04/18 0152   Fall Risk (Adult)   Absence of Falls making progress toward outcome     Patient remains free of falls, clutter free room and patient educated to call for assistance and verbalizes an understanding    Problem: Patient Care Overview  Goal: Plan of Care Review  Outcome: Ongoing (interventions implemented as appropriate)   08/04/18 0152   Coping/Psychosocial   Plan Of Care Reviewed With patient     Patient alert and oriented and able to make needs known.  Patient denies pain.  Keppra increased from 500 mg to 1000 mg tablets.  Patient neuro checks every 4 hours and WNL.  MRI of epileptic  brain to be scheduled, patient requests to go later today.  Patient educate on medication compliance and case management is assisting with discharge planning.  Discharge date is TBD.     Problem: Seizure Disorder/Epilepsy (Adult)  Intervention: Prevent Seizure-related Injury   08/04/18 0152   Safety Interventions   Seizure Precautions side rails padded;emergency equipment at bedside;clutter-free environment maintained       Patient side rails padded for safety and educated to call for assistance as needed. Patients remain seizure free.  EEG ordered  and completed

## 2018-08-04 NOTE — PLAN OF CARE
Problem: Patient Care Overview  Goal: Plan of Care Review  Outcome: Ongoing (interventions implemented as appropriate)  Pt is AAXO4. Seizure precautions in place, no s/sx of seizure activity. Suction at bedside. Pt is having MRI completed now. Continue on keppra. Appetite good. Denies pain. Neurology following. Pt states that he is having a hard time recalling certain things and is concerned with his short term memory. Pt addressed these concerns with neurologist at bedside. Neuro checks wnl. VSS. Pt voids spontaneously. ambulates with SBA. Fall precautions in place. Denies pain. NAD noted. Call light in reach.

## 2018-08-04 NOTE — ASSESSMENT & PLAN NOTE
58-year-old male with a pmhx of seizures, currently homeless that was evaluated earlier in the ER for seizure activity which re-occurred in the waiting room s/p keppra loading.    MRI brain (epilepsy protocol) ordered   - Keppra increased to 1000mg BID per neurology  - 2g keppra load in the ED  - keppra level pending   - EEG >1 hour ordered revealed left sided epileptiform discharges seen, suggestive of   underlying epileptiform potential, asymmetry with left sided slowing seen, suggestive of   underlying structural lesion and mild to moderate generalized slowing seen, suggestive of   moderate diffuse or multifocal cerebral dysfunction however, no electrographic seizures seen.  - Neurology consulted and appreciate recs  - CTH without acute abnormalities.  - afebrile, VSS, no leukocytosis.   - Initially acidotic with CO2 15, AG 19- resolved with IVF.   - Initial LA >12, given 1L of NS.  Repeat LA 0.9.   - Troponins flat x2. EKG shows SR with 1st degree AVB (no previous to compare).  - ETOH negative, UDS negative  - UA -  - seizure precautions, fall precautions, neuro checks Q4 hours

## 2018-08-04 NOTE — PROGRESS NOTES
Neurology Progress Note  Length of Stay:  1         Interval History:  No overnight issues. No further seizures. No issues with keppra. States that he would be able to get medication if prescribed. Does report some continued alcohol use but states that this was only on 2 occasions. We discussed need for cessation and he stated understanding. Also wanted to mention his memory is worse since fall and ball of his feet feel numb (chronic).      MEDICATIONS  Scheduled Meds:   levETIRAcetam  1,000 mg Oral BID     Continuous Infusions:  PRN Meds:.acetaminophen, albuterol-ipratropium, dextrose 50%, dextrose 50%, glucagon (human recombinant), glucose, glucose, ketorolac, ondansetron, promethazine, ramelteon, senna-docusate 8.6-50 mg, sodium chloride 0.9%    ALLERGIES  Review of patient's allergies indicates:  No Known Allergies      Physical Exam  Vitals:    08/04/18 1100 08/04/18 1156 08/04/18 1500 08/04/18 1530   BP:  (!) 104/58  109/68   BP Location:  Left arm  Left arm   Patient Position:  Lying  Lying   Pulse: 71 75 73 69   Resp:  20  20   Temp:  98.2 °F (36.8 °C)  98.7 °F (37.1 °C)   TempSrc:  Oral  Oral   SpO2:  96%  97%   Weight:       Height:             IMAGING (personally reviewed):  HCT: No acute intracranial abnormalities    MRI: Deferred due to patient request    EEG, extended (Armand): This is an abnormal extended EEG (1 hour and 26 minutes) due to:  1) left sided epileptiform discharges seen, suggestive of   underlying epileptiform potential  2) asymmetry with left sided slowing seen, suggestive of   underlying structural lesion  3) mild to moderate generalized slowing seen, suggestive of   moderate diffuse or multifocal cerebral dysfunction.    LABS:  Lab Results   Component Value Date    WBC 6.74 08/04/2018    HGB 12.1 (L) 08/04/2018    HCT 36.5 (L) 08/04/2018    MCV 92 08/04/2018     08/04/2018     Pending: B6, B1    Normal/negative: vit D, HIV    Positive/remarkable: vit B12 324              ASSESSMENT:        1. 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 Resumed on admission and uptitrated on 8/3 given frequent epileptiform discharges on EEG. Patient tolerating medication well. No further seizures. Pending MRI to rule out structural abnormalities lending to seizures.     - strongly encouraged abstaining from alcohol and drugs  - encouraged compliance with AED, keppra 1000mg bid  - pending MRI, if no acute abnormalities, can discharge. Can otherwise address outpatient  - given seizure precautions. Does not drive.  - will schedule f/u in resident clinic in 2-4 weeks.    2. Numbness in bilateral feet, low B12  Peripheral neuropathy labs ordered. Concern for malnutrition given social situation and possible poor or inconsistent diet  - recommend repletion of B12 with parenteral supplementation as low normal levels can also lend to peripheral neuropathy.   - also recommend checking RPR and HIV      Ann Sharpe MD  General Neurology Staff  [facility]

## 2018-08-04 NOTE — PROGRESS NOTES
"[facility] Hospital Medicine  Progress Note    Patient Name: Terrell Yusuf  MRN: [id number]  Patient Class: IP- Inpatient   Admission Date: 8/2/2018  Length of Stay: 1 days  Attending Physician: Mahnaz Obrien MD  Primary Care Provider: Scar Whitfield MD    [facility] Medicine Team: [facility] E Robe Lewis PA-C    Subjective:     Principal Problem:Recurrent seizures    HPI:  This is a 58-year-old male with a pmhx of seizures, currently homeless that was evaluated earlier in the ER for seizure activity.  [place] brought to the ER after suspected witnessed seizure activity, but upon arrival was AAOx3. Per ED, he did experience an episode of 22 min generalized tonic-clonic activity in front of the previous ER attending, that ultimately self-resolved.  The patient's initial lactic acid was 12, confirming the episode of seizure.  Patient's other labs were grossly within normal limits including a negative UDS and normal alcohol level.  After the patient received 2 g of IV Keppra and was observed for several hours and continued to remain AAO x3 was allowed to be discharged home.  While in the waiting room in a wheelchair he experienced subsequent episode of witnessed generalized shaking, and was brought back into the ER.      Patient has been unable to afford his seizure medications and is currently homeless.  Also states that "Anders told me to stop taking my medications".  Would not elaborate further.  Patient postictal during my evaluation but oriented and appropriate otherwise.  Pt reports a hx of sezires for several years, and previously seen at [facility] for management.  He stopped taking his medications in February.  He experiences x1 seizure/year and and tonic-clinic in nature.  Pt denies B/B incontinence and saddle paresthesias, but reports tongue biting this evening.  Denies any known triggers, and denies ETOH, drug, smoking use.  Denies hx " of MI, CVA or any other pmhx.  Pt denies HA, dizziness, weakness, f/c abd pain, CP, SOB.      In ED, afebrile, VSS, no leukocytosis. Acidotic with CO2 15, AG 19. Initial LA >12, given 1L of NS.  Repeat LA 0.9. Initial trop .010. CTH without acute abnormalities. EKG shows SR with 1st degree AVB (no previous to compare).    Hospital Course:  Terrell Yusuf was admitted for multiple seizures within a 24 hour period with a change in type seizure. Neurology consulted on admit and recommend increasing Keppra to 1000mg BID. EEG with left sided epileptiform discharges seen, suggestive of underlying epileptiform potential, asymmetry with left sided slowing seen, suggestive of underlying structural lesion and mild to moderate generalized slowing seen, suggestive of moderate diffuse or multifocal cerebral dysfunction. MRI brain (epilepsy protocol) ordered.    Interval History: No reported seizures since admit. Patient resting comfortably in bed. Plan of care discussed, patient verbalized understanding.    Review of Systems   Constitutional: Negative for activity change, appetite change, chills and fever.   HENT: Negative for congestion and rhinorrhea.    Eyes: Negative for photophobia and visual disturbance.   Respiratory: Negative for cough and shortness of breath.    Cardiovascular: Negative for chest pain and palpitations.   Gastrointestinal: Negative for abdominal pain, diarrhea, nausea and vomiting.   Genitourinary: Negative for difficulty urinating and dysuria.   Musculoskeletal: Positive for back pain (lower back). Negative for neck pain and neck stiffness.   Skin: Negative for rash and wound.   Neurological: Positive for seizures. Negative for dizziness, weakness and headaches.   Psychiatric/Behavioral: Negative for agitation and confusion.     Objective:     Vital Signs (Most Recent):  Temp: 98.2 °F (36.8 °C) (08/04/18 1156)  Pulse: 75 (08/04/18 1156)  Resp: 20 (08/04/18 1156)  BP: (!) 104/58 (08/04/18 1156)  SpO2: 96  % (08/04/18 1156) Vital Signs (24h Range):  Temp:  [97.2 °F (36.2 °C)-98.3 °F (36.8 °C)] 98.2 °F (36.8 °C)  Pulse:  [64-75] 75  Resp:  [14-20] 20  SpO2:  [93 %-97 %] 96 %  BP: (102-112)/(58-88) 104/58     Weight: 74.4 kg (164 lb 0.4 oz)  Body mass index is 23.53 kg/m².    Intake/Output Summary (Last 24 hours) at 08/04/18 1354  Last data filed at 08/04/18 0900   Gross per 24 hour   Intake              480 ml   Output              800 ml   Net             -320 ml      Physical Exam   Constitutional: He is oriented to person, place, and time. He appears well-developed and well-nourished.   HENT:   Head: Normocephalic and atraumatic.   Right Ear: External ear normal.   Left Ear: External ear normal.   Exterior nasal abrasions   Eyes: Conjunctivae and EOM are normal. Pupils are equal, round, and reactive to light.   Neck: Normal range of motion. Neck supple.   Cardiovascular: Normal rate, regular rhythm and normal heart sounds.    Pulmonary/Chest: Effort normal and breath sounds normal.   Abdominal: Soft. Bowel sounds are normal. There is no tenderness.   Musculoskeletal: Normal range of motion. He exhibits no edema or deformity.   Neurological: He is alert and oriented to person, place, and time.   Skin: Skin is warm and dry. Capillary refill takes less than 2 seconds.   Psychiatric: He has a normal mood and affect. His speech is normal and behavior is normal.   Nursing note and vitals reviewed.      Significant Labs: All pertinent labs within the past 24 hours have been reviewed.    Significant Imaging: I have reviewed all pertinent imaging results/findings within the past 24 hours.    Assessment/Plan:      * Recurrent seizures    58-year-old male with a pmhx of seizures, currently homeless that was evaluated earlier in the ER for seizure activity which re-occurred in the waiting room s/p keppra loading.    MRI brain (epilepsy protocol) ordered   - Keppra increased to 1000mg BID per neurology  - 2g keppra load in the  ED  - keppra level pending   - EEG >1 hour ordered revealed left sided epileptiform discharges seen, suggestive of   underlying epileptiform potential, asymmetry with left sided slowing seen, suggestive of   underlying structural lesion and mild to moderate generalized slowing seen, suggestive of   moderate diffuse or multifocal cerebral dysfunction however, no electrographic seizures seen.  - Neurology consulted and appreciate recs  - CTH without acute abnormalities.  - afebrile, VSS, no leukocytosis.   - Initially acidotic with CO2 15, AG 19- resolved with IVF.   - Initial LA >12, given 1L of NS.  Repeat LA 0.9.   - Troponins flat x2. EKG shows SR with 1st degree AVB (no previous to compare).  - ETOH negative, UDS negative  - UA -  - seizure precautions, fall precautions, neuro checks Q4 hours        Homeless    - homeless x1.5 years  - will need assistance from SW for f/u; has medicaid        Noncompliance with medications    Reports noncompliance with medications d/t money issues.  Also states that Anders told him to stop taking his medications, but was postictal at the time.  May need outpatient psych evaluation.  - Need to reinforce taking medications regularly.  - explained risks of not taking keppra          VTE Risk Mitigation         Ordered     Place JAMES hose  Until discontinued      08/02/18 2124     Place sequential compression device  Until discontinued      08/02/18 2124     IP VTE LOW RISK PATIENT  Once      08/02/18 2124              Robe Lewis PA-C  Department of Hospital Medicine   [facility]

## 2018-08-05 VITALS
OXYGEN SATURATION: 94 % | HEIGHT: 70 IN | TEMPERATURE: 98 F | BODY MASS INDEX: 23.48 KG/M2 | SYSTOLIC BLOOD PRESSURE: 100 MMHG | DIASTOLIC BLOOD PRESSURE: 57 MMHG | HEART RATE: 70 BPM | WEIGHT: 164 LBS | RESPIRATION RATE: 16 BRPM

## 2018-08-05 PROBLEM — R20.0 NUMBNESS IN FEET: Status: ACTIVE | Noted: 2018-08-05

## 2018-08-05 PROBLEM — R79.89 LOW VITAMIN B12 LEVEL: Status: ACTIVE | Noted: 2018-08-05

## 2018-08-05 PROBLEM — G40.109 LOCALIZATION-RELATED EPILEPSY: Status: ACTIVE | Noted: 2018-08-05

## 2018-08-05 LAB
ANION GAP SERPL CALC-SCNC: 8 MMOL/L
BASOPHILS # BLD AUTO: 0.05 K/UL
BASOPHILS NFR BLD: 0.7 %
BUN SERPL-MCNC: 13 MG/DL
CALCIUM SERPL-MCNC: 9.2 MG/DL
CHLORIDE SERPL-SCNC: 107 MMOL/L
CO2 SERPL-SCNC: 27 MMOL/L
CREAT SERPL-MCNC: 0.9 MG/DL
DIFFERENTIAL METHOD: ABNORMAL
EOSINOPHIL # BLD AUTO: 0.1 K/UL
EOSINOPHIL NFR BLD: 1.7 %
ERYTHROCYTE [DISTWIDTH] IN BLOOD BY AUTOMATED COUNT: 13.2 %
EST. GFR  (AFRICAN AMERICAN): >60 ML/MIN/1.73 M^2
EST. GFR  (NON AFRICAN AMERICAN): >60 ML/MIN/1.73 M^2
GLUCOSE SERPL-MCNC: 85 MG/DL
HCT VFR BLD AUTO: 36.4 %
HGB BLD-MCNC: 12.1 G/DL
IMM GRANULOCYTES # BLD AUTO: 0.03 K/UL
IMM GRANULOCYTES NFR BLD AUTO: 0.4 %
LYMPHOCYTES # BLD AUTO: 2.1 K/UL
LYMPHOCYTES NFR BLD: 29.2 %
MCH RBC QN AUTO: 30.8 PG
MCHC RBC AUTO-ENTMCNC: 33.2 G/DL
MCV RBC AUTO: 93 FL
MONOCYTES # BLD AUTO: 0.8 K/UL
MONOCYTES NFR BLD: 11.3 %
NEUTROPHILS # BLD AUTO: 4 K/UL
NEUTROPHILS NFR BLD: 56.7 %
NRBC BLD-RTO: 0 /100 WBC
PLATELET # BLD AUTO: 242 K/UL
PMV BLD AUTO: 10 FL
POTASSIUM SERPL-SCNC: 4 MMOL/L
RBC # BLD AUTO: 3.93 M/UL
SODIUM SERPL-SCNC: 142 MMOL/L
WBC # BLD AUTO: 7.1 K/UL

## 2018-08-05 PROCEDURE — 99239 HOSP IP/OBS DSCHRG MGMT >30: CPT | Mod: ,,, | Performed by: PHYSICIAN ASSISTANT

## 2018-08-05 PROCEDURE — 85025 COMPLETE CBC W/AUTO DIFF WBC: CPT

## 2018-08-05 PROCEDURE — 25000003 PHARM REV CODE 250: Performed by: PHYSICIAN ASSISTANT

## 2018-08-05 PROCEDURE — 36415 COLL VENOUS BLD VENIPUNCTURE: CPT

## 2018-08-05 PROCEDURE — 63600175 PHARM REV CODE 636 W HCPCS: Performed by: PHYSICIAN ASSISTANT

## 2018-08-05 PROCEDURE — 80048 BASIC METABOLIC PNL TOTAL CA: CPT

## 2018-08-05 RX ORDER — LEVETIRACETAM 1000 MG/1
1000 TABLET ORAL 2 TIMES DAILY
Qty: 60 TABLET | Refills: 11 | Status: SHIPPED | OUTPATIENT
Start: 2018-08-05 | End: 2018-10-30 | Stop reason: HOSPADM

## 2018-08-05 RX ORDER — LEVETIRACETAM 1000 MG/1
1000 TABLET ORAL 2 TIMES DAILY
Qty: 60 TABLET | Refills: 11 | Status: SHIPPED | OUTPATIENT
Start: 2018-08-05 | End: 2018-10-30 | Stop reason: SDUPTHER

## 2018-08-05 RX ADMIN — KETOROLAC TROMETHAMINE 15 MG: 30 INJECTION, SOLUTION INTRAMUSCULAR at 08:08

## 2018-08-05 RX ADMIN — LEVETIRACETAM 1000 MG: 500 TABLET ORAL at 08:08

## 2018-08-05 RX ADMIN — BENZOCAINE: 100 GEL TOPICAL at 08:08

## 2018-08-05 NOTE — ASSESSMENT & PLAN NOTE
Patient had an unwitnessed seizure and a 22 min witnessed generalized tonic clonic seizure.  LA was elevated at initial ED encounter at 12 making a seizure probable.  The patient has a self reported history of a seizure disorder since 1978, and was taking AEDs from 1979 till the past February when he began to have more regular seizures.  Besides LA, most of the initial workup was negative including UDS and EtOH.  CT head showed no acute abnormality.  Received 2g Keppra in the ED and is now scheduled to keppra 500mg BID.    Patient is ready for discharge from a neurology standpoint.  Continue Keppra 500mg BID and follow up as outpatient in 2-4 weeks.

## 2018-08-05 NOTE — PLAN OF CARE
"Problem: Patient Care Overview  Goal: Plan of Care Review  Outcome: Ongoing (interventions implemented as appropriate)   08/05/18 0301   Coping/Psychosocial   Plan Of Care Reviewed With patient     Patient is alert but with short term memory lost, able ot make needs known.  Patient reported tingling and burning sensation to BLE and oral discomfort, " I think I might have bitten my lip when I was having a seizure. KRISTEN paged an new orders received to administer anabesol ointment to his gumline.   Patient requested inés juan 8 mg for sleep and offered and accepted a night snack.  Patient discharged date is TBD.      "

## 2018-08-05 NOTE — PLAN OF CARE
Pt being discharged today.  Pt needs transportation and [facility], [address] [place] [id number].  Pt's prescription was filled at [facility] and will be delivered to his bedside.  Cost after his insurance was one dollar and it was covered by case management. CM will put in for Lizette's transportation after patient's meds are delivered, [phone]. CM spoke with pharmacy, they are delivering the meds, Lizette's transportation will send a car within the hour.     Provided patient with resource paperwork for housing.

## 2018-08-05 NOTE — PROGRESS NOTES
Patient came in with seizure due to noncompliance with AEDs.  Pt was restarted on Keppra 500mg BID and uptitrated to keppra 1000mg due to epileptiform activity on EEG although there were no definite seizures.  MRI has no acute abnormalities, still pending official read.      No further recommendations.  Continue Keppra at discharge, patient needs follow-up in neurology clinic in 2-4 weeks.      We are signing off now.  Call if you have any questions.    Ryne Zimmerman MD      ATTESTATION:   I discussed the patient with the resident and am familiar with this patient's medical and neurologic history and the current active problems as discussed with neurology resident. We reviewed the assessment and plan and I agree with the  plan as outlined in the resident's notes.    Since discussion, official read of MRI has returned. 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 Since patient will be following with [facility] in next 2-4 weeks, short term MRI can be obtain as outpatient in next 1-3 months.      Ann Sharpe MD  General Neurology Staff  [facility]

## 2018-08-05 NOTE — PROGRESS NOTES
Pt was provided with discharge instructions and education. Meds were delivered to room. Pt verbalized understanding on med compliance and the importance of the follow up appointment.  set up ride to take pt back to where he lives. Pt was asking this nurse about medicaid. Lyn  was notified and she came to bedside to provide pt with phone numbers. PIV removed, catheter intact. Pt denied pain. NAD noted. Pt was instructed on safety precautions due to previous seizure activity. Pt had no witnessed seizures while hospitalized. MRI unremarkable. Left via wc with personal belongings.

## 2018-08-05 NOTE — SUBJECTIVE & OBJECTIVE
Subjective:     Interval History: NAEON.  Patient still has complaints of numbness on the bottom of the feet.  MRI final read still pending.  No seizure activity        Current Facility-Administered Medications   Medication Dose Route Frequency Provider Last Rate Last Dose    acetaminophen tablet 650 mg  650 mg Oral Q4H PRN Ryne Osborn PA-C   650 mg at 08/04/18 2133    albuterol-ipratropium 2.5 mg-0.5 mg/3 mL nebulizer solution 3 mL  3 mL Nebulization Q4H PRN Ryne Osborn PA-C        benzocaine 10 % mucosal gel   Mouth/Throat PRN Ryne Osborn PA-C        dextrose 50% injection 12.5 g  12.5 g Intravenous PRN Ryne Osborn PA-C        dextrose 50% injection 25 g  25 g Intravenous PRN Ryne Osborn PA-C        glucagon (human recombinant) injection 1 mg  1 mg Intramuscular PRN Ryne Osborn PA-C        glucose chewable tablet 16 g  16 g Oral PRN Ryne Osborn PA-C        glucose chewable tablet 24 g  24 g Oral PRN Ryne Osborn PA-C        ketorolac injection 15 mg  15 mg Intravenous Q6H PRN Ryne Osborn PA-C   15 mg at 08/05/18 0808    levETIRAcetam tablet 1,000 mg  1,000 mg Oral BID Ryne Osborn PA-C   1,000 mg at 08/05/18 0808    ondansetron disintegrating tablet 4 mg  4 mg Oral Q8H PRN Ryne Osborn PA-C        promethazine tablet 25 mg  25 mg Oral Q6H PRN Ryne Osborn PA-C        ramelteon tablet 8 mg  8 mg Oral Nightly PRN Ryne Osborn PA-C   8 mg at 08/04/18 2224    senna-docusate 8.6-50 mg per tablet 1 tablet  1 tablet Oral BID PRN Ryne Osborn PA-C        sodium chloride 0.9% flush 5 mL  5 mL Intravenous PRN Ryne Osborn PA-C           Review of Systems   Constitutional: Negative.    HENT: Positive for dental problem (broke teeth last year) and nosebleeds.    Eyes: Negative.    Respiratory: Negative.    Cardiovascular: Negative.  Negative for chest pain.   Gastrointestinal: Negative.    Endocrine: Negative.    Genitourinary:  Negative.         No incontinence   Musculoskeletal: Negative.  Negative for gait problem, neck pain and neck stiffness.   Skin: Positive for wound (nose wound).   Allergic/Immunologic: Negative.    Neurological: Positive for seizures. Negative for dizziness, tremors, syncope, facial asymmetry, speech difficulty, weakness, light-headedness, numbness and headaches.   Hematological: Negative.    Psychiatric/Behavioral: Negative.      Objective:     Vital Signs (Most Recent):  Temp: 97.1 °F (36.2 °C) (08/05/18 0805)  Pulse: 62 (08/05/18 0805)  Resp: 14 (08/05/18 0805)  BP: 107/65 (08/05/18 0805)  SpO2: 95 % (08/05/18 0805) Vital Signs (24h Range):  Temp:  [97.1 °F (36.2 °C)-98.7 °F (37.1 °C)] 97.1 °F (36.2 °C)  Pulse:  [62-75] 62  Resp:  [14-20] 14  SpO2:  [95 %-97 %] 95 %  BP: (103-116)/(58-71) 107/65     Weight: 74.4 kg (164 lb 0.4 oz)  Body mass index is 23.53 kg/m².    Physical Exam   Constitutional: He is oriented to person, place, and time. He appears well-developed and well-nourished.   HENT:   Head: Normocephalic and atraumatic.   Right Ear: External ear normal.   Left Ear: External ear normal.   Eyes: Conjunctivae and EOM are normal. Pupils are equal, round, and reactive to light.   Neck: Normal range of motion. Neck supple.   Cardiovascular: Normal rate, regular rhythm and normal heart sounds.    Pulmonary/Chest: Effort normal and breath sounds normal.   Abdominal: Soft. Bowel sounds are normal. There is no tenderness.   Musculoskeletal: Normal range of motion. He exhibits no edema or deformity.   Neurological: He is alert and oriented to person, place, and time.   Reflex Scores:       Tricep reflexes are 2+ on the right side and 2+ on the left side.       Bicep reflexes are 2+ on the right side and 2+ on the left side.       Brachioradialis reflexes are 2+ on the right side and 2+ on the left side.       Patellar reflexes are 2+ on the right side and 2+ on the left side.       Achilles reflexes are 2+ on the  right side and 2+ on the left side.  Skin: Skin is warm and dry. Capillary refill takes less than 2 seconds.   Psychiatric: He has a normal mood and affect. His speech is normal and behavior is normal.       NEUROLOGICAL EXAMINATION:     MENTAL STATUS   Oriented to person, place, and time.   Speech: speech is normal     CRANIAL NERVES     CN III, IV, VI   Pupils are equal, round, and reactive to light.  Extraocular motions are normal.     REFLEXES     Reflexes   Right brachioradialis: 2+  Left brachioradialis: 2+  Right biceps: 2+  Left biceps: 2+  Right triceps: 2+  Left triceps: 2+  Right patellar: 2+  Left patellar: 2+  Right achilles: 2+  Left achilles: 2+      Significant Labs:   Recent Lab Results       08/05/18  0413      Immature Granulocytes 0.4     Immature Grans (Abs) 0.03  Comment:  Mild elevation in immature granulocytes is non specific and   can be seen in a variety of conditions including stress response,   acute inflammation, trauma and pregnancy. Correlation with other   laboratory and clinical findings is essential.       Anion Gap 8     Baso # 0.05     Basophil% 0.7     BUN, Bld 13     Calcium 9.2     Chloride 107     CO2 27     Creatinine 0.9     Differential Method Automated     eGFR if African American >60.0     eGFR if non  >60.0  Comment:  Calculation used to obtain the estimated glomerular filtration  rate (eGFR) is the CKD-EPI equation.        Eos # 0.1     Eosinophil% 1.7     Glucose 85     Gran # (ANC) 4.0     Gran% 56.7     Hematocrit 36.4(L)     Hemoglobin 12.1(L)     Lymph # 2.1     Lymph% 29.2     MCH 30.8     MCHC 33.2     MCV 93     Mono # 0.8     Mono% 11.3     MPV 10.0     nRBC 0     Platelets 242     Potassium 4.0     RBC 3.93(L)     RDW 13.2     Sodium 142     WBC 7.10

## 2018-08-05 NOTE — NURSING
Patient c/o BLE tingling to legs and feet and oral soreness.  Patient has open sores on inner lower lip.  Patient rates discomfort a 4/10.   Paged RKISTEN, and awaiting call back     Patient also requests assistance from  regarding help with living arrangements and finding out who his assigned  is based on his medicaid.  Courtesy discharge planning and help with housing to ensure patients has a Medicaid CM for monitoring of follow appointments, medication compliance, and adequate housing.

## 2018-08-05 NOTE — ASSESSMENT & PLAN NOTE
58-year-old male with a pmhx of seizures, currently homeless that was evaluated earlier in the ER for seizure activity which re-occurred in the waiting room s/p keppra loading.    MRI brain (epilepsy protocol), reviewed by neurology who deemed patient stable for discharge   - Keppra increased to 1000mg BID per neurology  - 2g keppra load in the ED  - keppra level pending   - EEG >1 hour ordered revealed left sided epileptiform discharges seen, suggestive of   underlying epileptiform potential, asymmetry with left sided slowing seen, suggestive of   underlying structural lesion and mild to moderate generalized slowing seen, suggestive of   moderate diffuse or multifocal cerebral dysfunction however, no electrographic seizures seen.  - Neurology consulted and appreciate recs  - CTH without acute abnormalities.  - afebrile, VSS, no leukocytosis.   - Initially acidotic with CO2 15, AG 19- resolved with IVF.   - Initial LA >12, given 1L of NS.  Repeat LA 0.9.   - Troponins flat x2. EKG shows SR with 1st degree AVB (no previous to compare).  - ETOH negative, UDS negative

## 2018-08-05 NOTE — DISCHARGE SUMMARY
"[facility] Hospital Medicine  Discharge Summary      Patient Name: Terrell Yusuf  MRN: [id number]  Admission Date: 8/2/2018  Hospital Length of Stay: 2 days  Discharge Date and Time:  08/05/2018 12:47 PM  Attending Physician: Mahnza Obrien MD   Discharging Provider: Robe Lewis PA-C  Primary Care Provider: Scar Whitfield MD  [facility] Medicine Team: [facility]OS[facility] MED E Robe Lewis PA-C    HPI:   This is a 58-year-old male with a pmhx of seizures, currently homeless that was evaluated earlier in the ER for seizure activity.  The [place] brought to the ER after suspected witnessed seizure activity, but upon arrival was AAOx3. Per ED, he did experience an episode of 22 min generalized tonic-clonic activity in front of the previous ER attending, that ultimately self-resolved.  The patient's initial lactic acid was 12, confirming the episode of seizure.  Patient's other labs were grossly within normal limits including a negative UDS and normal alcohol level.  After the patient received 2 g of IV Keppra and was observed for several hours and continued to remain AAO x3 was allowed to be discharged home.  While in the waiting room in a wheelchair he experienced subsequent episode of witnessed generalized shaking, and was brought back into the ER.      Patient has been unable to afford his seizure medications and is currently homeless.  Also states that "Anders told me to stop taking my medications".  Would not elaborate further.  Patient postictal during my evaluation but oriented and appropriate otherwise.  Pt reports a hx of sezires for several years, and previously seen at [facility] for management.  He stopped taking his medications in February.  He experiences x1 seizure/year and and tonic-clinic in nature.  Pt denies B/B incontinence and saddle paresthesias, but reports tongue biting this evening.  Denies any known triggers, and denies ETOH, drug, " smoking use.  Denies hx of MI, CVA or any other pmhx.  Pt denies HA, dizziness, weakness, f/c abd pain, CP, SOB.      In ED, afebrile, VSS, no leukocytosis. Acidotic with CO2 15, AG 19. Initial LA >12, given 1L of NS.  Repeat LA 0.9. Initial trop .010. CTH without acute abnormalities. EKG shows SR with 1st degree AVB (no previous to compare).    * No surgery found *      Hospital Course:   Terrell Yusuf was admitted for multiple seizures within a 24 hour period with a change in type seizure. Neurology consulted on admit and recommend increasing Keppra to 1000mg BID. EEG with left sided epileptiform discharges seen, suggestive of underlying epileptiform potential, asymmetry with left sided slowing seen, suggestive of underlying structural lesion and mild to moderate generalized slowing seen, suggestive of moderate diffuse or multifocal cerebral dysfunction. MRI brain (epilepsy protocol) ordered and reviewed per neurology who deemed patient appropriate for discharge with follow-up in 4 weeks. Transportation set up via .     Imaging:  Ct Head Without Contrast    Result Date: 8/2/2018  EXAMINATION: CT HEAD WITHOUT CONTRAST CLINICAL HISTORY: Seizures new or progressive; TECHNIQUE: Low dose axial images were obtained through the head.  Coronal and sagittal reformations were also performed. Contrast was not administered. COMPARISON: None. FINDINGS: The brain parenchyma appears normal for age with good corticomedullary differentiation.  There is no evidence of acute infarct, hemorrhage, or mass.  The ventricular system is normal in size.  No mass-effect or midline shift.  There are no abnormal extra-axial fluid collections.  The paranasal sinuses and mastoid air cells are clear.  The calvarium appears intact.  .     No acute intracranial abnormalities Electronically signed by: Zafar Ibarra MD Date:    08/02/2018 Time:    19:32    Mri Brain Epilepsy W W/o Contrast    Result Date: 8/5/2018  EXAMINATION: MRI BRAIN  EPILEPSY W W/O CONTRAST CLINICAL HISTORY: Seizures new or progressive; TECHNIQUE: Multiplanar multisequence MR imaging of the brain was performed before and after the administration of 8 mL Gadavist intravenous contrast. Additional thin cut images were performed through the hippocampi per epilepsy protocol. COMPARISON: Head  FINDINGS: Intracranial Compartment: Ventricles and sulci are normal in size for age without evidence of hydrocephalus. No extra-axial blood or fluid collections. Numerous punctate T2/FLAIR hyperintense foci in the supratentorial white matter, likely chronic microvascular ischemic change. 6 mm ill-defined blush like focus of enhancement in the posterior right temporal lobe (sequence 20 image 11).  There is no corresponding mass effect or edema.  Subtle susceptibility artifact at this site on the SWI images. No parenchymal mass or acute hemorrhage.  No recent or remote major vascular distribution infarct.  No neuronal migrational or cortical organizational abnormality identified.  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  Right hippocampus appears within normal limits. Normal vascular flow voids are preserved. Skull/Extracranial Contents (limited evaluation): Bone marrow signal intensity is normal.     Examination mildly degraded by patient motion artifact. 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 6 mm enhancing lesion in the subcortical white matter of the posterior right temporal lobe as above.  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  Comparison with prior imaging suggested; if unavailable a follow-up study may be warranted to confirm stability in light of atypical location. Mild chronic microvascular ischemic change.         Consults:   Consults         Status Ordering Provider     Inpatient consult to Neurology  Once     Provider:  (Not yet assigned)    Completed CARLOS A GARCIA     Inpatient consult to Social Work  Once     Provider:  (Not yet assigned)    Acknowledged DORIAN DAVIS          * Recurrent seizures    58-year-old male with a pmhx of seizures, currently homeless that was evaluated earlier in the ER for seizure activity which re-occurred in the waiting room s/p keppra loading.    MRI brain (epilepsy protocol), reviewed by neurology who deemed patient stable for discharge   - Keppra increased to 1000mg BID per neurology  - 2g keppra load in the ED  - keppra level pending   - EEG >1 hour ordered revealed left sided epileptiform discharges seen, suggestive of   underlying epileptiform potential, asymmetry with left sided slowing seen, suggestive of   underlying structural lesion and mild to moderate generalized slowing seen, suggestive of   moderate diffuse or multifocal cerebral dysfunction however, no electrographic seizures seen.  - Neurology consulted and appreciate recs  - CTH without acute abnormalities.  - afebrile, VSS, no leukocytosis.   - Initially acidotic with CO2 15, AG 19- resolved with IVF.   - Initial LA >12, given 1L of NS.  Repeat LA 0.9.   - Troponins flat x2. EKG shows SR with 1st degree AVB (no previous to compare).  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    Specialty:  Family Medicine  Why:  at 10:00 AM  Contact information:  [address]  [facility] [address]  [phone]             Ann Sharpe MD. Schedule an appointment as soon as possible for a visit in 4 weeks.    Specialty:  Neurology  Why:  Neurology follow-up  Contact information:  [address]  [facility] [address]  [phone]                 Patient Instructions:     Ambulatory referral to Neurology   Referral Priority: Routine Referral Type: Consultation   Referral Reason: Specialty Services Required    Requested Specialty: Neurology    Number of Visits Requested: 1      Diet Adult Regular     No driving until:   Order Comments: CLEARED BY NEUROLOGY     Notify your health care provider if you experience any of the following:  increased confusion or weakness     Activity as tolerated         Significant Diagnostic Studies: Labs: All labs within the past 24 hours have been reviewed    Pending Diagnostic Studies:     Procedure Component Value Units Date/Time    Vitamin B1 [[id number]] Collected:  08/03/18 1028    Order Status:  Sent Lab Status:  In process Updated:  08/03/18 1047    Specimen:  Blood from Blood     Vitamin B6 [[id number]] Collected:  08/03/18 1028    Order Status:  Sent Lab Status:  In process Updated:  08/03/18 1047    Specimen:  Blood from Blood          Medications:  Reconciled Home Medications:      Medication List      CHANGE how you take these medications    * levETIRAcetam 1000 MG tablet  Commonly known as:  KEPPRA  Take 1 tablet (1,000 mg total) by mouth 2 (two) times daily.  What changed:  · medication strength  · how much to take     * levETIRAcetam 1000 MG tablet  Commonly known as:  KEPPRA  Take 1 tablet (1,000 mg total) by mouth 2 (two) times daily.  What changed:  You were already taking a medication with the same name, and this prescription was added. Make sure you understand  how and when to take each.        * This list has 2 medication(s) that are the same as other medications prescribed for you. Read the directions carefully, and ask your doctor or other care provider to review them with you.                Indwelling Lines/Drains at time of discharge:   Lines/Drains/Airways          No matching active lines, drains, or airways          Time spent on the discharge of patient: 30 minutes  Patient was seen and examined on the date of discharge and determined to be suitable for discharge.         Robe Lewis PA-C  Department of Hospital Medicine  [facility]

## 2018-08-06 NOTE — PLAN OF CARE
08/06/18 0612   Final Note   Assessment Type Final Discharge Note     Patient discharged on 8/3/18 to where he lives (pt homeless). $1 for medication paid for by the Case Management Dept. Lizette's transportation arranged by MARQUEZ Retana [facility] [address] [place]. [id number]. Transportation also paid for by the Case Management Dept. Discharge summary faxed to Dr. Scar Whitfield (PC[facility] at [facility]) f [phone].

## 2018-08-07 LAB
PYRIDOXAL SERPL-MCNC: 7 UG/L (ref 5–50)
VIT B1 SERPL-MCNC: 50 UG/L (ref 38–122)

## 2018-10-21 ENCOUNTER — HOSPITAL ENCOUNTER (INPATIENT)
Facility: HOSPITAL | Age: 58
LOS: 2 days | Discharge: HOME | DRG: 101 | End: 2018-10-23
Attending: EMERGENCY MEDICINE | Admitting: HOSPITALIST
Payer: MEDICAID

## 2018-10-21 DIAGNOSIS — Z01.818 PRE-OP EXAM: ICD-10-CM

## 2018-10-21 DIAGNOSIS — F22 DELUSIONS: ICD-10-CM

## 2018-10-21 DIAGNOSIS — R56.9 SEIZURE: ICD-10-CM

## 2018-10-21 DIAGNOSIS — R56.9 SEIZURES: Primary | ICD-10-CM

## 2018-10-21 DIAGNOSIS — Z01.811 PRE-OP CHEST EXAM: ICD-10-CM

## 2018-10-21 DIAGNOSIS — R41.82 ALTERED MENTAL STATUS, UNSPECIFIED ALTERED MENTAL STATUS TYPE: ICD-10-CM

## 2018-10-21 LAB
ALBUMIN SERPL BCP-MCNC: 3.9 G/DL
ALP SERPL-CCNC: 108 U/L
ALT SERPL W/O P-5'-P-CCNC: 18 U/L
AMPHET+METHAMPHET UR QL: NEGATIVE
ANION GAP SERPL CALC-SCNC: 9 MMOL/L
AST SERPL-CCNC: 17 U/L
BACTERIA #/AREA URNS AUTO: NORMAL /HPF
BARBITURATES UR QL SCN>200 NG/ML: NEGATIVE
BASOPHILS # BLD AUTO: 0.04 K/UL
BASOPHILS NFR BLD: 0.5 %
BENZODIAZ UR QL SCN>200 NG/ML: NEGATIVE
BILIRUB SERPL-MCNC: 0.4 MG/DL
BILIRUB UR QL STRIP: NEGATIVE
BUN SERPL-MCNC: 21 MG/DL
BZE UR QL SCN: NEGATIVE
CALCIUM SERPL-MCNC: 9.4 MG/DL
CANNABINOIDS UR QL SCN: NEGATIVE
CHLORIDE SERPL-SCNC: 104 MMOL/L
CLARITY UR REFRACT.AUTO: CLEAR
CO2 SERPL-SCNC: 25 MMOL/L
COLOR UR AUTO: ABNORMAL
CREAT SERPL-MCNC: 0.7 MG/DL
CREAT UR-MCNC: 25 MG/DL
DIFFERENTIAL METHOD: ABNORMAL
EOSINOPHIL # BLD AUTO: 0.1 K/UL
EOSINOPHIL NFR BLD: 1.6 %
ERYTHROCYTE [DISTWIDTH] IN BLOOD BY AUTOMATED COUNT: 12.3 %
EST. GFR  (AFRICAN AMERICAN): >60 ML/MIN/1.73 M^2
EST. GFR  (NON AFRICAN AMERICAN): >60 ML/MIN/1.73 M^2
ETHANOL SERPL-MCNC: <10 MG/DL
ETHANOL UR-MCNC: <10 MG/DL
GLUCOSE SERPL-MCNC: 93 MG/DL
GLUCOSE UR QL STRIP: NEGATIVE
HCT VFR BLD AUTO: 40.2 %
HGB BLD-MCNC: 13.2 G/DL
HGB UR QL STRIP: ABNORMAL
IMM GRANULOCYTES # BLD AUTO: 0.02 K/UL
IMM GRANULOCYTES NFR BLD AUTO: 0.2 %
INR PPP: 1
KETONES UR QL STRIP: NEGATIVE
LEUKOCYTE ESTERASE UR QL STRIP: NEGATIVE
LYMPHOCYTES # BLD AUTO: 2.1 K/UL
LYMPHOCYTES NFR BLD: 25.5 %
MAGNESIUM SERPL-MCNC: 2.4 MG/DL
MCH RBC QN AUTO: 30.3 PG
MCHC RBC AUTO-ENTMCNC: 32.8 G/DL
MCV RBC AUTO: 92 FL
METHADONE UR QL SCN>300 NG/ML: NEGATIVE
MICROSCOPIC COMMENT: NORMAL
MONOCYTES # BLD AUTO: 1 K/UL
MONOCYTES NFR BLD: 12.6 %
NEUTROPHILS # BLD AUTO: 4.9 K/UL
NEUTROPHILS NFR BLD: 59.6 %
NITRITE UR QL STRIP: NEGATIVE
NRBC BLD-RTO: 0 /100 WBC
OPIATES UR QL SCN: NEGATIVE
PCP UR QL SCN>25 NG/ML: NEGATIVE
PH UR STRIP: 5 [PH] (ref 5–8)
PHOSPHATE SERPL-MCNC: 3.7 MG/DL
PLATELET # BLD AUTO: 202 K/UL
PMV BLD AUTO: 10.6 FL
POTASSIUM SERPL-SCNC: 3.9 MMOL/L
PROT SERPL-MCNC: 7.2 G/DL
PROT UR QL STRIP: NEGATIVE
PROTHROMBIN TIME: 10.8 SEC
RBC # BLD AUTO: 4.36 M/UL
RBC #/AREA URNS AUTO: 1 /HPF (ref 0–4)
SODIUM SERPL-SCNC: 138 MMOL/L
SP GR UR STRIP: 1 (ref 1–1.03)
TOXICOLOGY INFORMATION: NORMAL
TSH SERPL DL<=0.005 MIU/L-ACNC: 2.75 UIU/ML
URN SPEC COLLECT METH UR: ABNORMAL
UROBILINOGEN UR STRIP-ACNC: NEGATIVE EU/DL
WBC # BLD AUTO: 8.17 K/UL
WBC #/AREA URNS AUTO: 1 /HPF (ref 0–5)

## 2018-10-21 PROCEDURE — 85610 PROTHROMBIN TIME: CPT

## 2018-10-21 PROCEDURE — 93005 ELECTROCARDIOGRAM TRACING: CPT

## 2018-10-21 PROCEDURE — 96365 THER/PROPH/DIAG IV INF INIT: CPT

## 2018-10-21 PROCEDURE — 93010 ELECTROCARDIOGRAM REPORT: CPT | Mod: ,,, | Performed by: INTERNAL MEDICINE

## 2018-10-21 PROCEDURE — 80053 COMPREHEN METABOLIC PANEL: CPT

## 2018-10-21 PROCEDURE — 85025 COMPLETE CBC W/AUTO DIFF WBC: CPT

## 2018-10-21 PROCEDURE — 84443 ASSAY THYROID STIM HORMONE: CPT

## 2018-10-21 PROCEDURE — 25000003 PHARM REV CODE 250: Performed by: EMERGENCY MEDICINE

## 2018-10-21 PROCEDURE — 99285 EMERGENCY DEPT VISIT HI MDM: CPT | Mod: ,,, | Performed by: EMERGENCY MEDICINE

## 2018-10-21 PROCEDURE — 81001 URINALYSIS AUTO W/SCOPE: CPT

## 2018-10-21 PROCEDURE — 63600175 PHARM REV CODE 636 W HCPCS: Performed by: PHYSICIAN ASSISTANT

## 2018-10-21 PROCEDURE — 80320 DRUG SCREEN QUANTALCOHOLS: CPT

## 2018-10-21 PROCEDURE — 83735 ASSAY OF MAGNESIUM: CPT

## 2018-10-21 PROCEDURE — 84100 ASSAY OF PHOSPHORUS: CPT

## 2018-10-21 PROCEDURE — 12000002 HC ACUTE/MED SURGE SEMI-PRIVATE ROOM

## 2018-10-21 PROCEDURE — 80307 DRUG TEST PRSMV CHEM ANLYZR: CPT

## 2018-10-21 PROCEDURE — 99285 EMERGENCY DEPT VISIT HI MDM: CPT | Mod: 25

## 2018-10-21 PROCEDURE — 80177 DRUG SCRN QUAN LEVETIRACETAM: CPT

## 2018-10-21 RX ORDER — LEVETIRACETAM 15 MG/ML
1500 INJECTION INTRAVASCULAR
Status: COMPLETED | OUTPATIENT
Start: 2018-10-21 | End: 2018-10-21

## 2018-10-21 RX ADMIN — LEVETIRACETAM 1500 MG: 15 INJECTION INTRAVENOUS at 10:10

## 2018-10-21 RX ADMIN — SODIUM CHLORIDE, SODIUM LACTATE, POTASSIUM CHLORIDE, AND CALCIUM CHLORIDE 1000 ML: .6; .31; .03; .02 INJECTION, SOLUTION INTRAVENOUS at 09:10

## 2018-10-22 PROBLEM — R56.9 SEIZURES: Status: ACTIVE | Noted: 2018-10-22

## 2018-10-22 LAB
ANION GAP SERPL CALC-SCNC: 7 MMOL/L
BASOPHILS # BLD AUTO: 0.03 K/UL
BASOPHILS NFR BLD: 0.4 %
BUN SERPL-MCNC: 14 MG/DL
CALCIUM SERPL-MCNC: 9.4 MG/DL
CHLORIDE SERPL-SCNC: 104 MMOL/L
CK SERPL-CCNC: 146 U/L
CO2 SERPL-SCNC: 28 MMOL/L
CREAT SERPL-MCNC: 0.8 MG/DL
DIFFERENTIAL METHOD: ABNORMAL
EOSINOPHIL # BLD AUTO: 0 K/UL
EOSINOPHIL NFR BLD: 0.4 %
ERYTHROCYTE [DISTWIDTH] IN BLOOD BY AUTOMATED COUNT: 12.4 %
EST. GFR  (AFRICAN AMERICAN): >60 ML/MIN/1.73 M^2
EST. GFR  (NON AFRICAN AMERICAN): >60 ML/MIN/1.73 M^2
ESTIMATED AVG GLUCOSE: 91 MG/DL
GLUCOSE SERPL-MCNC: 98 MG/DL
HBA1C MFR BLD HPLC: 4.8 %
HCT VFR BLD AUTO: 41.2 %
HGB BLD-MCNC: 13.9 G/DL
IMM GRANULOCYTES # BLD AUTO: 0.03 K/UL
IMM GRANULOCYTES NFR BLD AUTO: 0.4 %
LYMPHOCYTES # BLD AUTO: 0.9 K/UL
LYMPHOCYTES NFR BLD: 11.7 %
MAGNESIUM SERPL-MCNC: 2.3 MG/DL
MCH RBC QN AUTO: 31 PG
MCHC RBC AUTO-ENTMCNC: 33.7 G/DL
MCV RBC AUTO: 92 FL
MONOCYTES # BLD AUTO: 0.6 K/UL
MONOCYTES NFR BLD: 8.7 %
NEUTROPHILS # BLD AUTO: 5.7 K/UL
NEUTROPHILS NFR BLD: 78.4 %
NRBC BLD-RTO: 0 /100 WBC
PHOSPHATE SERPL-MCNC: 3.2 MG/DL
PLATELET # BLD AUTO: 204 K/UL
PMV BLD AUTO: 10.4 FL
POTASSIUM SERPL-SCNC: 4.2 MMOL/L
RBC # BLD AUTO: 4.49 M/UL
SODIUM SERPL-SCNC: 139 MMOL/L
WBC # BLD AUTO: 7.24 K/UL

## 2018-10-22 PROCEDURE — 11000001 HC ACUTE MED/SURG PRIVATE ROOM

## 2018-10-22 PROCEDURE — G0378 HOSPITAL OBSERVATION PER HR: HCPCS

## 2018-10-22 PROCEDURE — 83036 HEMOGLOBIN GLYCOSYLATED A1C: CPT

## 2018-10-22 PROCEDURE — 83735 ASSAY OF MAGNESIUM: CPT

## 2018-10-22 PROCEDURE — 36415 COLL VENOUS BLD VENIPUNCTURE: CPT

## 2018-10-22 PROCEDURE — 82550 ASSAY OF CK (CPK): CPT

## 2018-10-22 PROCEDURE — 85025 COMPLETE CBC W/AUTO DIFF WBC: CPT

## 2018-10-22 PROCEDURE — 80048 BASIC METABOLIC PNL TOTAL CA: CPT

## 2018-10-22 PROCEDURE — 86592 SYPHILIS TEST NON-TREP QUAL: CPT

## 2018-10-22 PROCEDURE — 99203 OFFICE O/P NEW LOW 30 MIN: CPT | Mod: ,,, | Performed by: PSYCHIATRY & NEUROLOGY

## 2018-10-22 PROCEDURE — 25000003 PHARM REV CODE 250: Performed by: PHYSICIAN ASSISTANT

## 2018-10-22 PROCEDURE — 84100 ASSAY OF PHOSPHORUS: CPT

## 2018-10-22 PROCEDURE — 95951 HC EEG MONITORING/VIDEO RECORD: CPT

## 2018-10-22 PROCEDURE — 95951 PR EEG MONITORING/VIDEORECORD: CPT | Mod: 26,,, | Performed by: PSYCHIATRY & NEUROLOGY

## 2018-10-22 PROCEDURE — 99222 1ST HOSP IP/OBS MODERATE 55: CPT | Mod: ,,, | Performed by: PHYSICIAN ASSISTANT

## 2018-10-22 RX ORDER — ACETAMINOPHEN 500 MG
1000 TABLET ORAL EVERY 8 HOURS PRN
Status: DISCONTINUED | OUTPATIENT
Start: 2018-10-22 | End: 2018-10-23 | Stop reason: HOSPADM

## 2018-10-22 RX ORDER — LEVETIRACETAM 500 MG/1
1000 TABLET ORAL 2 TIMES DAILY
Status: DISCONTINUED | OUTPATIENT
Start: 2018-10-22 | End: 2018-10-23

## 2018-10-22 RX ORDER — SODIUM CHLORIDE 0.9 % (FLUSH) 0.9 %
5 SYRINGE (ML) INJECTION
Status: DISCONTINUED | OUTPATIENT
Start: 2018-10-22 | End: 2018-10-23 | Stop reason: HOSPADM

## 2018-10-22 RX ORDER — POLYETHYLENE GLYCOL 3350 17 G/17G
17 POWDER, FOR SOLUTION ORAL DAILY
Status: DISCONTINUED | OUTPATIENT
Start: 2018-10-22 | End: 2018-10-23 | Stop reason: HOSPADM

## 2018-10-22 RX ORDER — BISACODYL 10 MG
10 SUPPOSITORY, RECTAL RECTAL DAILY PRN
Status: DISCONTINUED | OUTPATIENT
Start: 2018-10-22 | End: 2018-10-23 | Stop reason: HOSPADM

## 2018-10-22 RX ORDER — ACETAMINOPHEN 325 MG/1
650 TABLET ORAL EVERY 8 HOURS PRN
Status: DISCONTINUED | OUTPATIENT
Start: 2018-10-22 | End: 2018-10-23 | Stop reason: HOSPADM

## 2018-10-22 RX ORDER — IBUPROFEN 200 MG
16 TABLET ORAL
Status: DISCONTINUED | OUTPATIENT
Start: 2018-10-22 | End: 2018-10-23 | Stop reason: HOSPADM

## 2018-10-22 RX ORDER — PROCHLORPERAZINE EDISYLATE 5 MG/ML
5 INJECTION INTRAMUSCULAR; INTRAVENOUS EVERY 6 HOURS PRN
Status: DISCONTINUED | OUTPATIENT
Start: 2018-10-22 | End: 2018-10-23 | Stop reason: HOSPADM

## 2018-10-22 RX ORDER — ONDANSETRON 8 MG/1
8 TABLET, ORALLY DISINTEGRATING ORAL EVERY 8 HOURS PRN
Status: DISCONTINUED | OUTPATIENT
Start: 2018-10-22 | End: 2018-10-23 | Stop reason: HOSPADM

## 2018-10-22 RX ORDER — GLUCAGON 1 MG
1 KIT INJECTION
Status: DISCONTINUED | OUTPATIENT
Start: 2018-10-22 | End: 2018-10-23 | Stop reason: HOSPADM

## 2018-10-22 RX ORDER — IPRATROPIUM BROMIDE AND ALBUTEROL SULFATE 2.5; .5 MG/3ML; MG/3ML
3 SOLUTION RESPIRATORY (INHALATION) EVERY 4 HOURS PRN
Status: DISCONTINUED | OUTPATIENT
Start: 2018-10-22 | End: 2018-10-23 | Stop reason: HOSPADM

## 2018-10-22 RX ORDER — IBUPROFEN 200 MG
24 TABLET ORAL
Status: DISCONTINUED | OUTPATIENT
Start: 2018-10-22 | End: 2018-10-23 | Stop reason: HOSPADM

## 2018-10-22 RX ORDER — LEVETIRACETAM 1000 MG/1
1000 TABLET ORAL 2 TIMES DAILY
Status: ON HOLD | COMMUNITY
End: 2018-10-23 | Stop reason: SDUPTHER

## 2018-10-22 RX ORDER — ACETAMINOPHEN 325 MG/1
650 TABLET ORAL EVERY 4 HOURS PRN
Status: DISCONTINUED | OUTPATIENT
Start: 2018-10-22 | End: 2018-10-23 | Stop reason: HOSPADM

## 2018-10-22 RX ADMIN — BISACODYL 10 MG: 10 SUPPOSITORY RECTAL at 04:10

## 2018-10-22 RX ADMIN — LEVETIRACETAM 1000 MG: 500 TABLET ORAL at 08:10

## 2018-10-22 RX ADMIN — POLYETHYLENE GLYCOL 3350 17 G: 17 POWDER, FOR SOLUTION ORAL at 09:10

## 2018-10-22 RX ADMIN — LEVETIRACETAM 1000 MG: 500 TABLET ORAL at 09:10

## 2018-10-22 NOTE — HPI
58 y.o. Male with seizure disorder (40+ years) presented to ED 10/21/18 with breakthrough seizures over the weekend. Patients reports seizures began after head trauma from football in teenage years. Has been on numerous AEDs over the years, but unable to list them all. Currently follows with [facility]. Friend who witnessed episodes Friday evening provides description of events: kicking and punching, eyes barely open, unresponsive and one episode of urinary incontinence. Said first event lasted a few minutes and second seizure occurred ~1 hour later, lasting similar duration. After seizures, he has been lethargic and slow. Prior to these episodes, says he has ~1 seizure a year, but per chart review, he was just admitted August 2018 with breakthrough events attributed to medication noncompliance from social issues. Previous hx of being homeless. During last presentation, EEG was remarkable for left sided epileptiform discharges, no sascha seizures. Keppra was increased 500 mg BID to 1 g BID. Numbness/tingling in feet was also addressed and attributed to poor nutritional status and low B12 (324). This admission, labs have been unremarkable for infectious, obvious metabolic derangement. MRI Brain WO contrast was unremarkable. Levetiracetam level pending 10/21. He received 1.5 g of Keppra in ED and resumed on 1 g BID. Hooked up to EEG 10/22 and Neurology consulted for AED recommendations.

## 2018-10-22 NOTE — HPI
Terrell Yusuf is a 58M with seizures who presents for evaluation of seizure. On admission, no family or friends at bedside. Patient post-ictal, oriented to self and , unreliable historian. HPI per chart review.    Patient presents today with witnessed recurrent seizures over the weekend. Previously admitted in 2018 for the same, was non-compliant with keppra at that time, was found to have left sided epileptiform discharges and his keppra dose was increased to 1000 mg BID. On admit, he reports compliance with keppra 1 pill twice a day unknown dose, doesn't know who picks up Rx, did not follow up with neurology. History of homelessness noted in 2018 - unclear living situation now, doesn't know address. He states he feels shakey and strange but offers no further specifics.     ED: no WBC, afebrile, VSS, UDS negative, EtOH negative, TSH WNL, chemistry WNL, UA clean, MRI negative, KUB with constipation, CXR negative.

## 2018-10-22 NOTE — NURSING
Pt to floor at this time via stretcher with transport and friend. Pt is able to ambulate from the stretcher to the bed. Will assess and monitor pt.

## 2018-10-22 NOTE — PLAN OF CARE
Problem: Patient Care Overview  Goal: Plan of Care Review  Outcome: Ongoing (interventions implemented as appropriate)  Pt is alert and oriented x4. Pt complained of pain to abd, pt states he feels that he needed to have a bowel movement suppository was given pt was able to have bm. Pt is able to ambulate. Fall precautions are in place. Telemetry monitoring in place, pt running sinus rhythm. Pt to have EEG done today. Will continue to monitor pt.

## 2018-10-22 NOTE — PLAN OF CARE
SW following for d/c needs. SW informed by CM that pt will require transportation assistance.  SW will arrange transport when appropriate.        Lucia Arellano, MSW, LCSW  [facility]  [id number]

## 2018-10-22 NOTE — ED PROVIDER NOTES
"Encounter Date: 10/21/2018     SCRIBE #1 NOTE: I, Armin Erickson, am scribing for, and in the presence of,  Rafal Alvarez MD.        History     Chief Complaint   Patient presents with    Seizures     Pt reports seizure Friday and Saturday and now c/o bilateral numbness to feet. Pt has hisotry of seizure and reports compliance with Keppra medication. Pt's friend states "he has been confused since then". Pt AAX4. Pt states "I can't really answer questions". Seizure on friday was witnessed by friday and pt's friend denies head injury.     Patient is a 58 year old male with PMHX of seizures. He presents to the ED for AMS. Friend reports patient having two seizures on Friday evening, while resting in bed. Friend witnessed seizure activity reports patient shaking, unresponsive, and urinary incontinence. Friend reports patient first seizure lasting approximately 3-5 minutes and second seizure occurring one hour later and lasting approximately 3-5 minutes. Friend reports patient having similar seizure on Saturday. Friend reports patient with confusion and paresthesias of b/l feet unknown onset. Friend reports patient "feeling spacy." Friend denies head trauma.      The history is provided by a friend. The history is limited by the condition of the patient. No  was used.     Review of patient's allergies indicates:  No Known Allergies  No past medical history on file.  No past surgical history on file.  No family history on file.  Social History     Tobacco Use    Smoking status: Not on file   Substance Use Topics    Alcohol use: Not on file    Drug use: Not on file     Review of Systems   Unable to perform ROS: Mental status change       Physical Exam     Initial Vitals [10/21/18 2039]   BP Pulse Resp Temp SpO2   113/64 70 16 98.2 °F (36.8 °C) 96 %      MAP       --         Physical Exam    Vitals reviewed.  Constitutional: He appears well-developed and well-nourished. No distress.   Patient " resting comfortably in NAD on RA.    HENT:   Head: Normocephalic.   Eyes: Conjunctivae and EOM are normal. Pupils are equal, round, and reactive to light.   Neck: Normal range of motion.   Cardiovascular: Normal rate and regular rhythm.   No murmur heard.  Pulmonary/Chest: Breath sounds normal. No respiratory distress. He has no wheezes. He has no rales.   Abdominal: Soft. Bowel sounds are normal. He exhibits no distension. There is no tenderness.   Musculoskeletal: Normal range of motion.   Neurological: He is alert and oriented to person, place, and time. He has normal strength. No sensory deficit. Gait (unsteady ) abnormal. Coordination normal. GCS eye subscore is 4. GCS verbal subscore is 5. GCS motor subscore is 6.   Motor strength of b/l UE and LE 5/5. Finger to nose negative. Heel to shin negative. Pronator drift negative. No facial droop or asymmetry. Speech is clear. Tongue protrudes midline with no fasciculations.   Skin: Skin is warm and dry. No erythema.         ED Course   Procedures  Labs Reviewed   CBC W/ AUTO DIFFERENTIAL - Abnormal; Notable for the following components:       Result Value    RBC 4.36 (*)     Hemoglobin 13.2 (*)     All other components within normal limits   COMPREHENSIVE METABOLIC PANEL - Abnormal; Notable for the following components:    BUN, Bld 21 (*)     All other components within normal limits   URINALYSIS, REFLEX TO URINE CULTURE - Abnormal; Notable for the following components:    Occult Blood UA 1+ (*)     All other components within normal limits    Narrative:     Preferred Collection Type->Urine, Clean Catch   ALCOHOL,MEDICAL (ETHANOL)   PROTIME-INR   TSH   MAGNESIUM   PHOSPHORUS   TOXICOLOGY SCREEN, URINE, RANDOM (COMPLIANCE)    Narrative:     Preferred Collection Type->Urine, Clean Catch   URINALYSIS MICROSCOPIC    Narrative:     Preferred Collection Type->Urine, Clean Catch   LEVETIRACETAM  (KEPPRA) LEVEL   LACTIC ACID, PLASMA   LEVETIRACETAM  (KEPPRA) LEVEL   BASIC  METABOLIC PANEL   MAGNESIUM   PHOSPHORUS   HEMOGLOBIN A1C   CBC W/ AUTO DIFFERENTIAL     EKG Readings: (Independently Interpreted)   Rhythm: Normal Sinus Rhythm. Heart Rate: 60. ST Segments: Normal ST Segments. Axis: Left Axis Deviation.       Imaging Results          MRI Brain Without Contrast (Final result)  Result time 10/22/18 00:23:10    Final result by Hugo Gibbons MD (10/22/18 00:23:10)                 Impression:      No evidence of diffusion restriction suggest acute infarction.    Unremarkable noncontrast MRI of the brain.    Electronically signed by resident: Dereje Salazar  Date:    10/22/2018  Time:    00:04    Electronically signed by: Hugo Gibbons MD  Date:    10/22/2018  Time:    00:23             Narrative:    EXAMINATION:  MRI BRAIN WITHOUT CONTRAST    CLINICAL HISTORY:  Confusion/delirium, altered LOC, unexplained;Neuro deficit(s), subacute;hx of seizures;.  Unspecified convulsions    TECHNIQUE:  Multiplanar and multisequence MR imaging of the brain was performed without contrast.    COMPARISON:  CT head without contrast 03/13/2009    FINDINGS:  Intracranial compartment:    The craniocervical junction is within normal limits.  The ventricles and sulci are normal in size for age without evidence of hydrocephalus. No extra-axial blood or fluid collections.    Few punctate scattered T2 FLAIR hyperintensities in the supratentorial white matter which are often seen in the setting of microvascular ischemic change.  The hippocampal formations are unremarkable.  Otherwise the brain parenchyma appears unremarkable.  No mass lesion, acute hemorrhage, edema or acute infarct.    Normal vascular flow voids are preserved.    Skull/extracranial contents (limited evaluation): Bone marrow signal intensity is normal.  The orbits and intraorbital contents are unremarkable.                               X-Ray Abdomen AP 1 View (KUB) (Final result)  Result time 10/21/18 22:55:35    Final result by Ron Ovalle MD  "(10/21/18 22:55:35)                 Impression:      Moderate fecal loading in the colon suggestive of constipation.      Electronically signed by: Ron Ovalle MD  Date:    10/21/2018  Time:    22:55             Narrative:    EXAMINATION:  XR ABDOMEN AP 1 VIEW    CLINICAL HISTORY:  Encounter for other preprocedural examination    TECHNIQUE:  Single AP View of the abdomen was performed.    COMPARISON:  None.    FINDINGS:  Cardiac wires overlie the abdomen, pelvis, and lower chest.  There is moderate fecal loading in the colon.  Bowel gas pattern is overall nonobstructive.  There are multilevel degenerative changes in the lumbar spine.                               X-Ray Chest AP Portable (Final result)  Result time 10/21/18 22:54:32    Final result by Ron Ovalle MD (10/21/18 22:54:32)                 Impression:      No acute cardiopulmonary finding identified on this single view.      Electronically signed by: Ron Ovalle MD  Date:    10/21/2018  Time:    22:54             Narrative:    EXAMINATION:  XR CHEST AP PORTABLE    CLINICAL HISTORY:  Provided history is "  Encounter for preprocedural respiratory examination".    TECHNIQUE:  One view of the chest.    COMPARISON:  03/13/2009.    FINDINGS:  Cardiac wires overlie the chest.  Cardiac silhouette is magnified by technique and appears stable when compared with the prior exam.  No large focal consolidation.  No sizable pleural effusion.  No pneumothorax.  No detrimental change.                              X-Rays:   Independently Interpreted Readings:   Chest X-Ray: Normal heart size.  No infiltrates.  No acute abnormalities.   Abdomen: Moderate constipation without radiopaque foreign body     Medical Decision Making:   Clinical Tests:   Lab Tests: Reviewed and Ordered  Radiological Study: Reviewed and Ordered  Medical Tests: Reviewed and Ordered       APC / Resident Notes:   Patient is a 58 year old male with PMHX of seizures. He presents to the ED " for AMS.     Will order labs and imaging. Will order IVF and keppra IV, while in ED. Will continue to monitor.     Differential diagnoses include, but are not limited to: CVA, seizures, SDH, ICH, intoxication, or electrolyte imbalance.     No leukocytosis. Hemodynamically stable. Elevated BUN 21. PT-INR 10.8-1.0. TSH WNL. Ethanol WNL. Mg WNL. Phosphorus WNL. UA found to have 1+ occult blood. UDS unremarkable. Pre-op KUB found to have constipation. CXR found to have no acute process.     Patient reassessed, patient resting comfortably in NAD on RA. Will continue to monitor.     MRI brain found to have No evidence of diffusion restriction suggest acute infarction.     Will admit to medicine for observation.     I have discussed and reviewed with my supervising physician.         Attending Attestation:     Physician Attestation Statement for NP/PA:   I have conducted a face to face encounter with this patient in addition to the NP/PA, due to Patient Request    Other NP/PA Attestation Additions:    History of Present Illness: 59 Y/O male with personal Hx of seizure disorder presents for evaluation of 3 witnessed seizures in the last 48 hours.             Clinical Impression:   The primary encounter diagnosis was Seizures. Diagnoses of Seizure, Pre-op chest exam, Pre-op exam, and Altered mental status, unspecified altered mental status type were also pertinent to this visit.      Disposition:   Disposition: Placed in Observation  Condition: Stable                        Shameka Isaacs PA-C  10/22/18 0323

## 2018-10-22 NOTE — PLAN OF CARE
10/22/18 1000   Discharge Assessment   Assessment Type Discharge Planning Assessment   Confirmed/corrected address and phone number on facesheet? Yes   Assessment information obtained from? Patient   Expected Length of Stay (days) 2   Communicated expected length of stay with patient/caregiver yes   Prior to hospitilization cognitive status: Alert/Oriented   Prior to hospitalization functional status: Independent   Current cognitive status: Alert/Oriented   Current Functional Status: Independent   Lives With other (see comments)  (homeless)   Able to Return to Prior Arrangements yes   Is patient able to care for self after discharge? Yes   Patient's perception of discharge disposition home or selfcare   Readmission Within The Last 30 Days no previous admission in last 30 days   Patient currently being followed by outpatient case management? No   Patient currently receives any other outside agency services? No   Equipment Currently Used at Home none   Do you have any problems affording any of your prescribed medications? No   Is the patient taking medications as prescribed? yes   Does the patient have transportation home? No   Does the patient receive services at the [facility]? No   Discharge Plan A Other  (street)   Discharge Plan B Shelter   Patient/Family In Agreement With Plan yes     Dx: seizure  Consult: neuro  Pharm: [facility]  [facility] f/u appt: MICHAEL Yang (PCP at [facility]) on 10/25/18 at 1115    Patient requested information regarding completing a living will. Document with printed explanation given to the patient. CM instructed the patient to call this CM when he is ready to complete the form.

## 2018-10-22 NOTE — H&P
"[facility] Hospital Medicine  History & Physical    Patient Name: Terrell Yusuf  MRN: [id number]  Admission Date: 10/21/2018  Attending Physician: LUPILLO Clark MD   Primary Care Provider: Primary Doctor [facility] Medicine Team: Networked reference to record PCT  Dereje Araujo PA-C     Patient information was obtained from patient, past medical records and ER records.     Subjective:     Principal Problem:Seizures    Chief Complaint:   Chief Complaint   Patient presents with    Seizures     Pt reports seizure Friday and Saturday and now c/o bilateral numbness to feet. Pt has hisotry of seizure and reports compliance with Keppra medication. Pt's friend states "he has been confused since then". Pt AAX4. Pt states "I can't really answer questions". Seizure on friday was witnessed by friday and pt's friend denies head injury.        HPI: Terrell Yusuf is a 58M with seizures who presents for evaluation of seizure. On admission, no family or friends at bedside. Patient post-ictal, oriented to self and , unreliable historian. HPI per chart review.    Patient presents today with witnessed recurrent seizures over the weekend. Previously admitted in 2018 for the same, was non-compliant with keppra at that time, was found to have left sided epileptiform discharges and his keppra dose was increased to 1000 mg BID. On admit, he reports compliance with keppra 1 pill twice a day unknown dose, doesn't know who picks up Rx, did not follow up with neurology. History of homelessness noted in 2018 - unclear living situation now, doesn't know address. He states he feels shakey and strange but offers no further specifics.     ED: no WBC, afebrile, VSS, UDS negative, EtOH negative, TSH WNL, chemistry WNL, UA clean, MRI negative, KUB with constipation, CXR negative.     No past medical history on file.    No past surgical history on file.    Review of patient's allergies indicates:  No Known " Allergies    No current facility-administered medications on file prior to encounter.      No current outpatient medications on file prior to encounter.     Family History     None        Tobacco Use    Smoking status: Not on file   Substance and Sexual Activity    Alcohol use: Not on file    Drug use: Not on file    Sexual activity: Not on file     Review of Systems   Unable to perform ROS: Acuity of condition   Constitutional: Negative for fever.   Respiratory: Negative for cough and shortness of breath.    Cardiovascular: Negative for chest pain, palpitations and leg swelling.   Gastrointestinal: Negative for abdominal pain, nausea and vomiting.   Genitourinary: Negative for dysuria.   Musculoskeletal: Negative for arthralgias and gait problem.   Skin: Negative for rash and wound.   Neurological: Positive for seizures.   Psychiatric/Behavioral: Positive for confusion and decreased concentration. Negative for agitation.     Objective:     Vital Signs (Most Recent):  Temp: 98.1 °F (36.7 °C) (10/22/18 0119)  Pulse: 64 (10/22/18 0119)  Resp: 14 (10/22/18 0119)  BP: 138/72 (10/22/18 0119)  SpO2: 98 % (10/22/18 0119) Vital Signs (24h Range):  Temp:  [98.1 °F (36.7 °C)-98.2 °F (36.8 °C)] 98.1 °F (36.7 °C)  Pulse:  [59-70] 64  Resp:  [14-23] 14  SpO2:  [95 %-98 %] 98 %  BP: (107-138)/(64-74) 138/72     Weight: 74.8 kg (165 lb)  Body mass index is 25.09 kg/m².    Physical Exam   Constitutional: He appears well-developed and well-nourished. No distress.   HENT:   Head: Normocephalic and atraumatic.   Eyes: EOM are normal. Pupils are equal, round, and reactive to light.   Neck: Normal range of motion. Neck supple.   Cardiovascular: Normal rate and regular rhythm.   No murmur heard.  Pulmonary/Chest: Effort normal and breath sounds normal. No respiratory distress.   Abdominal: Soft. Bowel sounds are normal. He exhibits no distension. There is no tenderness.   Musculoskeletal: Normal range of motion. He exhibits no edema.    Neurological: He is alert.   Oriented to name and , 5/5 strength throughout   Skin: Skin is warm and dry. He is not diaphoretic. No erythema.   Psychiatric:   Flat, withdrawn   Nursing note and vitals reviewed.        CRANIAL NERVES     CN III, IV, VI   Pupils are equal, round, and reactive to light.  Extraocular motions are normal.        Significant Labs: All pertinent labs within the past 24 hours have been reviewed.    Significant Imaging: I have reviewed all pertinent imaging results/findings within the past 24 hours.    Assessment/Plan:     * Seizures    - recurrent seizures in patient with known seizures and questionable compliance   - loaded with keppra 1500 mg in ED, continue keppra 1000 mg BID per prior neuro reccs  - spot EEG  - MRI unremarkable  - neurology consulted       VTE Risk Mitigation (From admission, onward)        Ordered     IP VTE LOW RISK PATIENT  Once      10/22/18 0159     Place JAMES hose  Until discontinued      10/22/18 0159     Place sequential compression device  Until discontinued      10/22/18 0159             Dereje Araujo PA-C  Department of Hospital Medicine   [facility]

## 2018-10-22 NOTE — NURSING
Came down to pt room after hearing screaming in the [place]. Pt was in the bed on knees attempting to get out of bed. Pt friend at bedside was holding in bed.  Pt is alert and oriented at this time and show no neurological deficits. Pt states that he does not know why he was trying to get out of bed. Pt friend states he does this right before he has a seizure. Will continue to monitor pt.

## 2018-10-22 NOTE — SUBJECTIVE & OBJECTIVE
No past medical history on file.    No past surgical history on file.    Review of patient's allergies indicates:  No Known Allergies    No current facility-administered medications on file prior to encounter.      No current outpatient medications on file prior to encounter.     Family History     None        Tobacco Use    Smoking status: Not on file   Substance and Sexual Activity    Alcohol use: Not on file    Drug use: Not on file    Sexual activity: Not on file     Review of Systems   Unable to perform ROS: Acuity of condition   Constitutional: Negative for fever.   Respiratory: Negative for cough and shortness of breath.    Cardiovascular: Negative for chest pain, palpitations and leg swelling.   Gastrointestinal: Negative for abdominal pain, nausea and vomiting.   Genitourinary: Negative for dysuria.   Musculoskeletal: Negative for arthralgias and gait problem.   Skin: Negative for rash and wound.   Neurological: Positive for seizures.   Psychiatric/Behavioral: Positive for confusion and decreased concentration. Negative for agitation.     Objective:     Vital Signs (Most Recent):  Temp: 98.1 °F (36.7 °C) (10/22/18 0119)  Pulse: 64 (10/22/18 0119)  Resp: 14 (10/22/18 0119)  BP: 138/72 (10/22/18 0119)  SpO2: 98 % (10/22/18 0119) Vital Signs (24h Range):  Temp:  [98.1 °F (36.7 °C)-98.2 °F (36.8 °C)] 98.1 °F (36.7 °C)  Pulse:  [59-70] 64  Resp:  [14-23] 14  SpO2:  [95 %-98 %] 98 %  BP: (107-138)/(64-74) 138/72     Weight: 74.8 kg (165 lb)  Body mass index is 25.09 kg/m².    Physical Exam   Constitutional: He appears well-developed and well-nourished. No distress.   HENT:   Head: Normocephalic and atraumatic.   Eyes: EOM are normal. Pupils are equal, round, and reactive to light.   Neck: Normal range of motion. Neck supple.   Cardiovascular: Normal rate and regular rhythm.   No murmur heard.  Pulmonary/Chest: Effort normal and breath sounds normal. No respiratory distress.   Abdominal: Soft. Bowel sounds are  normal. He exhibits no distension. There is no tenderness.   Musculoskeletal: Normal range of motion. He exhibits no edema.   Neurological: He is alert.   Oriented to name and , 5/5 strength throughout   Skin: Skin is warm and dry. He is not diaphoretic. No erythema.   Psychiatric:   Flat, withdrawn   Nursing note and vitals reviewed.        CRANIAL NERVES     CN III, IV, VI   Pupils are equal, round, and reactive to light.  Extraocular motions are normal.        Significant Labs: All pertinent labs within the past 24 hours have been reviewed.    Significant Imaging: I have reviewed all pertinent imaging results/findings within the past 24 hours.

## 2018-10-22 NOTE — ASSESSMENT & PLAN NOTE
- recurrent seizures in patient with known seizures and questionable compliance   - loaded with keppra 1500 mg in ED, continue keppra 1000 mg BID per prior neuro reccs  - spot EEG  - MRI unremarkable  - neurology consulted
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
## 2018-10-23 VITALS
HEART RATE: 76 BPM | WEIGHT: 169.31 LBS | HEIGHT: 68 IN | DIASTOLIC BLOOD PRESSURE: 70 MMHG | SYSTOLIC BLOOD PRESSURE: 110 MMHG | BODY MASS INDEX: 25.66 KG/M2 | OXYGEN SATURATION: 96 % | TEMPERATURE: 97 F | RESPIRATION RATE: 18 BRPM

## 2018-10-23 PROBLEM — F22 DELUSIONS: Status: ACTIVE | Noted: 2018-10-23

## 2018-10-23 LAB
ANION GAP SERPL CALC-SCNC: 8 MMOL/L
BASOPHILS # BLD AUTO: 0.05 K/UL
BASOPHILS NFR BLD: 0.6 %
BUN SERPL-MCNC: 14 MG/DL
CALCIUM SERPL-MCNC: 9.3 MG/DL
CHLORIDE SERPL-SCNC: 104 MMOL/L
CO2 SERPL-SCNC: 27 MMOL/L
CREAT SERPL-MCNC: 0.8 MG/DL
DIFFERENTIAL METHOD: ABNORMAL
EOSINOPHIL # BLD AUTO: 0.1 K/UL
EOSINOPHIL NFR BLD: 0.8 %
ERYTHROCYTE [DISTWIDTH] IN BLOOD BY AUTOMATED COUNT: 12.4 %
EST. GFR  (AFRICAN AMERICAN): >60 ML/MIN/1.73 M^2
EST. GFR  (NON AFRICAN AMERICAN): >60 ML/MIN/1.73 M^2
GLUCOSE SERPL-MCNC: 94 MG/DL
HCT VFR BLD AUTO: 40.6 %
HGB BLD-MCNC: 13.5 G/DL
IMM GRANULOCYTES # BLD AUTO: 0.02 K/UL
IMM GRANULOCYTES NFR BLD AUTO: 0.3 %
LEVETIRACETAM SERPL-MCNC: 11 UG/ML (ref 3–60)
LYMPHOCYTES # BLD AUTO: 1.7 K/UL
LYMPHOCYTES NFR BLD: 21.7 %
MAGNESIUM SERPL-MCNC: 2 MG/DL
MCH RBC QN AUTO: 30.5 PG
MCHC RBC AUTO-ENTMCNC: 33.3 G/DL
MCV RBC AUTO: 92 FL
MONOCYTES # BLD AUTO: 0.9 K/UL
MONOCYTES NFR BLD: 11 %
NEUTROPHILS # BLD AUTO: 5.2 K/UL
NEUTROPHILS NFR BLD: 65.6 %
NRBC BLD-RTO: 0 /100 WBC
PHOSPHATE SERPL-MCNC: 3.5 MG/DL
PLATELET # BLD AUTO: 211 K/UL
PMV BLD AUTO: 10.8 FL
POTASSIUM SERPL-SCNC: 4.1 MMOL/L
RBC # BLD AUTO: 4.43 M/UL
RPR SER QL: NORMAL
SODIUM SERPL-SCNC: 139 MMOL/L
WBC # BLD AUTO: 7.91 K/UL

## 2018-10-23 PROCEDURE — 95813 EEG EXTND MNTR 61-119 MIN: CPT | Mod: 26,,, | Performed by: PSYCHIATRY & NEUROLOGY

## 2018-10-23 PROCEDURE — 84100 ASSAY OF PHOSPHORUS: CPT

## 2018-10-23 PROCEDURE — 90471 IMMUNIZATION ADMIN: CPT | Performed by: HOSPITALIST

## 2018-10-23 PROCEDURE — 90686 IIV4 VACC NO PRSV 0.5 ML IM: CPT | Performed by: HOSPITALIST

## 2018-10-23 PROCEDURE — 90670 PCV13 VACCINE IM: CPT | Performed by: HOSPITALIST

## 2018-10-23 PROCEDURE — 80048 BASIC METABOLIC PNL TOTAL CA: CPT

## 2018-10-23 PROCEDURE — 90472 IMMUNIZATION ADMIN EACH ADD: CPT | Performed by: HOSPITALIST

## 2018-10-23 PROCEDURE — 36415 COLL VENOUS BLD VENIPUNCTURE: CPT

## 2018-10-23 PROCEDURE — 63600175 PHARM REV CODE 636 W HCPCS: Performed by: HOSPITALIST

## 2018-10-23 PROCEDURE — 83735 ASSAY OF MAGNESIUM: CPT

## 2018-10-23 PROCEDURE — 11000001 HC ACUTE MED/SURG PRIVATE ROOM

## 2018-10-23 PROCEDURE — 25000003 PHARM REV CODE 250: Performed by: PHYSICIAN ASSISTANT

## 2018-10-23 PROCEDURE — 85025 COMPLETE CBC W/AUTO DIFF WBC: CPT

## 2018-10-23 PROCEDURE — 95951 HC EEG MONITORING/VIDEO RECORD: CPT

## 2018-10-23 PROCEDURE — 99222 1ST HOSP IP/OBS MODERATE 55: CPT | Mod: ,,, | Performed by: NURSE PRACTITIONER

## 2018-10-23 PROCEDURE — 99238 HOSP IP/OBS DSCHRG MGMT 30/<: CPT | Mod: ,,, | Performed by: PHYSICIAN ASSISTANT

## 2018-10-23 RX ORDER — LEVETIRACETAM 500 MG/1
1250 TABLET ORAL 2 TIMES DAILY
Qty: 150 TABLET | Refills: 11 | Status: SHIPPED | OUTPATIENT
Start: 2018-10-23 | End: 2018-10-23 | Stop reason: SDUPTHER

## 2018-10-23 RX ORDER — CITALOPRAM 20 MG/1
20 TABLET, FILM COATED ORAL DAILY
Status: CANCELLED | OUTPATIENT
Start: 2018-10-24

## 2018-10-23 RX ORDER — GABAPENTIN 300 MG/1
300 CAPSULE ORAL 3 TIMES DAILY
Status: CANCELLED | OUTPATIENT
Start: 2018-10-23

## 2018-10-23 RX ORDER — LEVETIRACETAM 500 MG/1
1500 TABLET ORAL 2 TIMES DAILY
Qty: 180 TABLET | Refills: 11 | Status: SHIPPED | OUTPATIENT
Start: 2018-10-23 | End: 2018-11-24

## 2018-10-23 RX ADMIN — PNEUMOCOCCAL 13-VALENT CONJUGATE VACCINE 0.5 ML: 2.2; 2.2; 2.2; 2.2; 2.2; 4.4; 2.2; 2.2; 2.2; 2.2; 2.2; 2.2; 2.2 INJECTION, SUSPENSION INTRAMUSCULAR at 06:10

## 2018-10-23 RX ADMIN — LEVETIRACETAM 1000 MG: 500 TABLET ORAL at 08:10

## 2018-10-23 RX ADMIN — INFLUENZA A VIRUS A/MICHIGAN/45/2015 X-275 (H1N1) ANTIGEN (FORMALDEHYDE INACTIVATED), INFLUENZA A VIRUS A/SINGAPORE/INFIMH-16-0019/2016 IVR-186 (H3N2) ANTIGEN (FORMALDEHYDE INACTIVATED), INFLUENZA B VIRUS B/PHUKET/3073/2013 ANTIGEN (FORMALDEHYDE INACTIVATED), AND INFLUENZA B VIRUS B/MARYLAND/15/2016 BX-69A ANTIGEN (FORMALDEHYDE INACTIVATED) 0.5 ML: 15; 15; 15; 15 INJECTION, SUSPENSION INTRAMUSCULAR at 04:10

## 2018-10-23 RX ADMIN — POLYETHYLENE GLYCOL 3350 17 G: 17 POWDER, FOR SOLUTION ORAL at 08:10

## 2018-10-23 NOTE — ASSESSMENT & PLAN NOTE
Assessment: Mr. Yusuf is a 59 y/o man presenting with irritable mood and congruent affect. His religous delusions appear to be chronic and are not presently causing him to be gravely disabled. He is goal-oriented, linear and organized, denies SI/HI, paranoia +AH/VH and + religous delusions.     Plan:  Mr. Yusuf is not gravely disabled, a danger to himself, or others. No recommendations from psych standpoint at this time. Nonetheless, patient was provided with an extensive list of outpatient psychiatric services and encouraged to call for follow-up. He was also provided a housing resource sheet during our interview.

## 2018-10-23 NOTE — ASSESSMENT & PLAN NOTE
EEG in place  - Keppra level 11   - recurrent seizures in patient with known seizures and questionable compliance   - loaded with keppra 1500 mg in ED, continue keppra 1000 mg BID per prior neuro reccs  - MRI unremarkable  - neurology consulted   - Will consider increasing to 1.5 g BID if level is therapeutic and behavioral change resolves   - alternatively, if patient feels Keppra alters his mood will discuss another AED that is affordable
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
## 2018-10-23 NOTE — ASSESSMENT & PLAN NOTE
Keppra 1500 BID at dc  - Keppra level 11   - recurrent seizures in patient with known seizures and questionable compliance   - MRI unremarkable  - neurology consulted and followed

## 2018-10-23 NOTE — PLAN OF CARE
Levetiracetam level resulted low/normal (11). Will increase Keppra from 1 g BID to 1250 mg BID. Ok for discharge from Neurology standpoint.     Terri Contreras PA-C  General Neurology Consult  Neuro Consult [facility] # [id number]

## 2018-10-23 NOTE — CONSULTS
"[facility]  Psychiatry  Consult Note    Patient Name: Terrell Yusuf  MRN: [id number]   Code Status: Full Code  Admission Date: 10/21/2018  Hospital Length of Stay: 0 days  Attending Physician: Svitlana Costa MD  Primary Care Provider: GAVINO Alvarez    Current Legal Status: N/A    Patient information was obtained from patient, caregiver / friend and ER records.   Inpatient consult to Psychiatry  Consult performed by: Alejandrina Tavera DNP  Consult ordered by: Robe Lewis PA-C  Reason for consult: active delusions; no history of psych hospitalizations; management recs        Subjective:     Principal Problem:Seizures    Chief Complaint:  delusions    HPI: On my interview Mr. Yusuf is irritable and minimally cooperative responding "I don't know" to most questions. 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 He has been to rehab twice but reports it did not help. Aside from his current low mood related to his hospitalization, he denies present or past symptoms of depression, gladis, anxiety, or PSTD. When asked about VH/AH he admits to hearing and see God and Anders. He denies thought control or thought broadcasting. Regarding his mood and irritability he attributes it to his health (" I thought I was done having seizures.") and his housing situation but he is hopeful because God told him a miracle was coming. He denies SI, hopelessness, or helplessness. He is goal-oriented: wants housing and to start refurbish/"upcycle" furniture which was a previous hobby of his.     Regarding religous delusions: Mr. Yusuf says "I gave my life to God 10 years ago and he put me on the streets." He saved his life and is the reason he is drug free now. Anders instructs him on his diet including avoiding " "processed food, "mayonaise, mustard, chicken, pork, and beef." His diet includes a variety vegetables, grains, and nuts. Anders has never told him to hurt himself or anyone else, "he wants us all to love each other." He attributes "the father" to the reason he is having memory problems. He says that this writer does not hear or see God because I do not read the [employer] Bible and because god doesn't like cell phones.     Collateral: Humaira - friend for over 1.5 years- she witnessed his most recent seizure leading to admission  She reports that this is patient's baseline. He is a "witness" and will often talk to others about Gods message. He goes to religous service every Sunday and reads the bible daily since she has known him.  She denies any safety concerns regarding SI/HI or being gravely disabled. She reports he is able to care for himself (spends money appropriately, able to feed himself). She appears to be supportive and he appears to respond well to her. She agreed she would help him call the housing resources provided during this interview.     Hospital Course: No notes on file         Patient History           Medical as of 10/23/2018     Past Medical History     Diagnosis Date Comments Source    Ex-cigarette smoker -- 9 years ago Provider    Seizures -- -- Provider                  Surgical as of 10/23/2018    Past Surgical History: Patient provided no pertinent surgical history.           Family as of 10/23/2018    None           Tobacco Use as of 10/23/2018     Smoking Status Smoking Start Date Smoking Quit Date Packs/Day Years Used    Former Smoker -- -- -- --    Types Comments Smokeless Tobacco Status Smokeless Tobacco Quit Date Source     Cigarettes 9 years ago Never Used -- Provider            Alcohol Use as of 10/23/2018     Alcohol Use Drinks/Week Alcohol/Week Comments Source    Yes -- -- Former abuser, last drink in Feb Provider    Frequency Standard Drinks Binge Drinking Source      -- -- -- " "Provider             Drug Use as of 10/23/2018     Drug Use Types Frequency Comments Source    Yes  "Crack" cocaine, Cocaine, MDMA (Ecstacy), Marijuana -- Former user Provider            Sexual Activity as of 10/23/2018     Sexually Active Birth Control Partners Comments Source    -- -- -- -- Provider            Activities of Daily Living as of 10/23/2018    None           Social Documentation as of 10/23/2018    None           Occupational as of 10/23/2018    None           Socioeconomic as of 10/23/2018     Marital Status Spouse Name Number of Children Years Education Education Level Preferred Language Ethnicity Race Source    Single -- -- -- -- English /White White Provider    Financial Resource Strain Food Insecurity: Worry Food Insecurity: Inability Transportation Needs: Medical Transportation Needs: Non-medical       -- -- -- -- --             Pertinent History     Question Response Comments    Lives with -- --    Place in Birth Order -- --    Lives in -- --    Number of Siblings -- --    Raised by -- --    Legal Involvement -- --    Childhood Trauma -- --    Criminal History of -- --    Financial Status -- --    Highest Level of Education -- --    Does patient have access to a firearm? -- --     Service -- --    Primary Leisure Activity -- --    Spirituality -- --        Past Medical History:   Diagnosis Date    Ex-cigarette smoker     9 years ago    Seizures      History reviewed. No pertinent surgical history.  Family History     None        Tobacco Use    Smoking status: Former Smoker     Types: Cigarettes    Smokeless tobacco: Never Used    Tobacco comment: 9 years ago   Substance and Sexual Activity    Alcohol use: Yes     Comment: Former abuser, last drink in Feb    Drug use: Yes     Types: "Crack" cocaine, Cocaine, MDMA (Ecstacy), Marijuana     Comment: Former user    Sexual activity: Not on file     Review of patient's allergies indicates:  No Known Allergies    No current " "facility-administered medications on file prior to encounter.      Current Outpatient Medications on File Prior to Encounter   Medication Sig    [DISCONTINUED] levETIRAcetam (KEPPRA) 1000 MG tablet Take 1,000 mg by mouth 2 (two) times daily.     Psychotherapeutics (From admission, onward)    None        Review of Systems   Constitutional: Negative.    HENT: Negative.    Eyes: Negative.    Respiratory: Negative.    Cardiovascular: Negative.    Gastrointestinal: Negative.    Endocrine: Negative.    Genitourinary: Negative.    Musculoskeletal: Negative.    Skin: Negative.    Allergic/Immunologic: Negative.    Neurological:        Numbness to bottom of his feet for several months   Hematological: Negative.    Psychiatric/Behavioral:        Jainism delusions, irritable mood     Strengths and Liabilities: Strength: Patient accepts guidance/feedback, Strength: Patient has positive support network., Liability: Patient is defensive., Liability: Patient lacks coping skills.    Objective:     Vital Signs (Most Recent):  Temp: 96.5 °F (35.8 °C) (10/23/18 1212)  Pulse: 74 (10/23/18 1459)  Resp: 17 (10/23/18 1212)  BP: 108/65 (10/23/18 1212)  SpO2: (!) 93 % (10/23/18 1212) Vital Signs (24h Range):  Temp:  [96.5 °F (35.8 °C)-98.8 °F (37.1 °C)] 96.5 °F (35.8 °C)  Pulse:  [58-81] 74  Resp:  [12-18] 17  SpO2:  [93 %-96 %] 93 %  BP: ()/(57-70) 108/65     Height: 5' 8" (172.7 cm)  Weight: 76.8 kg (169 lb 5 oz)  Body mass index is 25.74 kg/m².      Intake/Output Summary (Last 24 hours) at 10/23/2018 1522  Last data filed at 10/23/2018 1346  Gross per 24 hour   Intake 480 ml   Output 650 ml   Net -170 ml       Physical Exam   Psychiatric:   Psychiatric Review Of Systems - Is patient experiencing or having changes in:  sleep: no  appetite: no  weight: no  energy/anergy: no  interest/pleasure/anhedonia: no  somatic symptoms: no  libido: no  anxiety/panic: no  guilty/hopelessness: no  concentration: no  S.I.B.s/risky behavior: " no  Irritability: yes, related to present sitiatuon  Racing thoughts: no  Impulsive behaviors: no  Paranoia:no  AVH:yes, of Anders and God        Significant Labs: Drug screen from 10/21/18 negative.     Significant Imaging: MRI: I have reviewed all pertinent results/findings within the past 24 hours. unremarkable    Assessment/Plan:     Delusions    Assessment: Mr. Yusuf is a 57 y/o man presenting with irritable mood and congruent affect. His religous delusions appear to be chronic and are not presently causing him to be gravely disabled. He is goal-oriented, linear and organized, denies SI/HI, paranoia +AH/VH and + religous delusions.     Plan:  Mr. Yusuf is not gravely disabled, a danger to himself, or others. No recommendations from psych standpoint at this time. Nonetheless, patient was provided with an extensive list of outpatient psychiatric services and encouraged to call for follow-up. He was also provided a housing resource sheet during our interview.           Total Time:  60 minutes      Alejandrina Tavera, PARMINDER   Psychiatry  [facility]

## 2018-10-23 NOTE — SUBJECTIVE & OBJECTIVE
"Past Medical History:   Diagnosis Date    Ex-cigarette smoker     9 years ago    Seizures      History reviewed. No pertinent surgical history.    Review of patient's allergies indicates:  No Known Allergies    Current Neurological Medications:  Keppra 1 g BID    No current facility-administered medications on file prior to encounter.      Current Outpatient Medications on File Prior to Encounter   Medication Sig    levETIRAcetam (KEPPRA) 1000 MG tablet Take 1,000 mg by mouth 2 (two) times daily.     Family History     None        Tobacco Use    Smoking status: Former Smoker     Types: Cigarettes    Smokeless tobacco: Never Used    Tobacco comment: 9 years ago   Substance and Sexual Activity    Alcohol use: Yes     Comment: Former abuser, last drink in Feb    Drug use: Yes     Types: "Crack" cocaine, Cocaine, MDMA (Ecstacy), Marijuana     Comment: Former user    Sexual activity: Not on file     Review of Systems   Constitutional: Positive for activity change and fatigue. Negative for fever.   Neurological: Positive for seizures and numbness. Negative for dizziness, facial asymmetry and headaches.   Psychiatric/Behavioral: Positive for agitation, confusion, decreased concentration and sleep disturbance.     Objective:     Vital Signs (Most Recent):  Temp: 98.8 °F (37.1 °C) (10/22/18 1604)  Pulse: 81 (10/22/18 1604)  Resp: 16 (10/22/18 1604)  BP: (!) 102/57 (10/22/18 1604)  SpO2: 95 % (10/22/18 1604) Vital Signs (24h Range):  Temp:  [97.9 °F (36.6 °C)-98.8 °F (37.1 °C)] 98.8 °F (37.1 °C)  Pulse:  [56-81] 81  Resp:  [13-23] 16  SpO2:  [95 %-98 %] 95 %  BP: (102-138)/(57-74) 102/57     Weight: 76.8 kg (169 lb 5 oz)  Body mass index is 25.74 kg/m².    Physical Exam   Eyes: EOM are normal. Pupils are equal, round, and reactive to light.   Psychiatric: His speech is normal.     NEUROLOGICAL EXAMINATION:     MENTAL STATUS   Oriented to person.   Follows 1 step commands.   Attention: decreased.   Speech: speech is " normal   Level of consciousness: alert  Knowledge: poor.   Able to repeat.     CRANIAL NERVES     CN II   Visual fields full to confrontation.     CN III, IV, VI   Pupils are equal, round, and reactive to light.  Extraocular motions are normal.   Ophthalmoparesis: none    CN V   Facial sensation intact.     CN VII   Facial expression full, symmetric.     CN VIII   Hearing: intact    CN IX, X   Palate: symmetric    CN XI   CN XI normal.     MOTOR EXAM   Overall muscle tone: normal  Right arm pronator drift: absent  Left arm pronator drift: absent    Strength   Right biceps: 5/5  Left biceps: 5/5  Right triceps: 5/5  Left triceps: 5/5  Right interossei: 5/5  Left interossei: 5/5  Right anterior tibial: 5/5  Left anterior tibial: 5/5  Right posterior tibial: 5/5  Left posterior tibial: 5/5    SENSORY EXAM   Right leg light touch: decreased from ankle  Left leg light touch: decreased from ankle  Right leg vibration: decreased from ankle  Left leg vibration: decreased from ankle    GAIT AND COORDINATION     Gait  Gait: (deferred)    Tremor   Resting tremor: absent    Significant Labs:   CBC:   Recent Labs   Lab 10/21/18  2159 10/22/18  0942   WBC 8.17 7.24   HGB 13.2* 13.9*   HCT 40.2 41.2    204     CMP:   Recent Labs   Lab 10/21/18  2159 10/22/18  0942   GLU 93 98    139   K 3.9 4.2    104   CO2 25 28   BUN 21* 14   CREATININE 0.7 0.8   CALCIUM 9.4 9.4   MG 2.4 2.3   PROT 7.2  --    ALBUMIN 3.9  --    BILITOT 0.4  --    ALKPHOS 108  --    AST 17  --    ALT 18  --    ANIONGAP 9 7*   EGFRNONAA >60.0 >60.0     Urine Culture: No results for input(s): LABURIN in the last 48 hours.  Urine Studies:   Recent Labs   Lab 10/21/18  2303   COLORU Straw   APPEARANCEUA Clear   PHUR 5.0   SPECGRAV 1.005   PROTEINUA Negative   GLUCUA Negative   KETONESU Negative   BILIRUBINUA Negative   OCCULTUA 1+*   NITRITE Negative   UROBILINOGEN Negative   LEUKOCYTESUR Negative   RBCUA 1   WBCUA 1   BACTERIA Rare      Levetiracetam level pending     All pertinent lab results from the past 24 hours have been reviewed.    EEG (10/22/18) results pending     Significant Imaging: I have reviewed and interpreted all pertinent imaging results/findings within the past 24 hours.     MRI Brain WO contrast (10/22/18):  No evidence of diffusion restriction suggest acute infarction. Unremarkable noncontrast MRI of the brain.

## 2018-10-23 NOTE — SUBJECTIVE & OBJECTIVE
"     Patient History           Medical as of 10/23/2018     Past Medical History     Diagnosis Date Comments Source    Ex-cigarette smoker -- 9 years ago Provider    Seizures -- -- Provider                  Surgical as of 10/23/2018    Past Surgical History: Patient provided no pertinent surgical history.           Family as of 10/23/2018    None           Tobacco Use as of 10/23/2018     Smoking Status Smoking Start Date Smoking Quit Date Packs/Day Years Used    Former Smoker -- -- -- --    Types Comments Smokeless Tobacco Status Smokeless Tobacco Quit Date Source     Cigarettes 9 years ago Never Used -- Provider            Alcohol Use as of 10/23/2018     Alcohol Use Drinks/Week Alcohol/Week Comments Source    Yes -- -- Former abuser, last drink in Feb Provider    Frequency Standard Drinks Binge Drinking Source      -- -- -- Provider             Drug Use as of 10/23/2018     Drug Use Types Frequency Comments Source    Yes  "Crack" cocaine, Cocaine, MDMA (Ecstacy), Marijuana -- Former user Provider            Sexual Activity as of 10/23/2018     Sexually Active Birth Control Partners Comments Source    -- -- -- -- Provider            Activities of Daily Living as of 10/23/2018    None           Social Documentation as of 10/23/2018    None           Occupational as of 10/23/2018    None           Socioeconomic as of 10/23/2018     Marital Status Spouse Name Number of Children Years Education Education Level Preferred Language Ethnicity Race Source    Single -- -- -- -- English /White White Provider    Financial Resource Strain Food Insecurity: Worry Food Insecurity: Inability Transportation Needs: Medical Transportation Needs: Non-medical       -- -- -- -- --             Pertinent History     Question Response Comments    Lives with -- --    Place in Birth Order -- --    Lives in -- --    Number of Siblings -- --    Raised by -- --    Legal Involvement -- --    Childhood Trauma -- --    Criminal History of " "-- --    Financial Status -- --    Highest Level of Education -- --    Does patient have access to a firearm? -- --     Service -- --    Primary Leisure Activity -- --    Spirituality -- --        Past Medical History:   Diagnosis Date    Ex-cigarette smoker     9 years ago    Seizures      History reviewed. No pertinent surgical history.  Family History     None        Tobacco Use    Smoking status: Former Smoker     Types: Cigarettes    Smokeless tobacco: Never Used    Tobacco comment: 9 years ago   Substance and Sexual Activity    Alcohol use: Yes     Comment: Former abuser, last drink in Feb    Drug use: Yes     Types: "Crack" cocaine, Cocaine, MDMA (Ecstacy), Marijuana     Comment: Former user    Sexual activity: Not on file     Review of patient's allergies indicates:  No Known Allergies    No current facility-administered medications on file prior to encounter.      Current Outpatient Medications on File Prior to Encounter   Medication Sig    [DISCONTINUED] levETIRAcetam (KEPPRA) 1000 MG tablet Take 1,000 mg by mouth 2 (two) times daily.     Psychotherapeutics (From admission, onward)    None        Review of Systems   Constitutional: Negative.    HENT: Negative.    Eyes: Negative.    Respiratory: Negative.    Cardiovascular: Negative.    Gastrointestinal: Negative.    Endocrine: Negative.    Genitourinary: Negative.    Musculoskeletal: Negative.    Skin: Negative.    Allergic/Immunologic: Negative.    Neurological:        Numbness to bottom of his feet for several months   Hematological: Negative.    Psychiatric/Behavioral:        Lutheran delusions, irritable mood     Strengths and Liabilities: Strength: Patient accepts guidance/feedback, Strength: Patient has positive support network., Liability: Patient is defensive., Liability: Patient lacks coping skills.    Objective:     Vital Signs (Most Recent):  Temp: 96.5 °F (35.8 °C) (10/23/18 1212)  Pulse: 74 (10/23/18 1459)  Resp: 17 (10/23/18 " "1212)  BP: 108/65 (10/23/18 1212)  SpO2: (!) 93 % (10/23/18 1212) Vital Signs (24h Range):  Temp:  [96.5 °F (35.8 °C)-98.8 °F (37.1 °C)] 96.5 °F (35.8 °C)  Pulse:  [58-81] 74  Resp:  [12-18] 17  SpO2:  [93 %-96 %] 93 %  BP: ()/(57-70) 108/65     Height: 5' 8" (172.7 cm)  Weight: 76.8 kg (169 lb 5 oz)  Body mass index is 25.74 kg/m².      Intake/Output Summary (Last 24 hours) at 10/23/2018 1522  Last data filed at 10/23/2018 1346  Gross per 24 hour   Intake 480 ml   Output 650 ml   Net -170 ml       Physical Exam   Psychiatric:   Psychiatric Review Of Systems - Is patient experiencing or having changes in:  sleep: no  appetite: no  weight: no  energy/anergy: no  interest/pleasure/anhedonia: no  somatic symptoms: no  libido: no  anxiety/panic: no  guilty/hopelessness: no  concentration: no  S.I.B.s/risky behavior: no  Irritability: yes, related to present sitiatuon  Racing thoughts: no  Impulsive behaviors: no  Paranoia:no  AVH:yes, of Anders and God        Significant Labs: Drug screen from 10/21/18 negative.     Significant Imaging: MRI: I have reviewed all pertinent results/findings within the past 24 hours. unremarkable  "

## 2018-10-23 NOTE — CONSULTS
[facility]  Neurology  Consult Note    Patient Name: Terrell Yusuf  MRN: [id number]  Admission Date: 10/21/2018  Hospital Length of Stay: 0 days  Code Status: Full Code   Attending Provider: LUPILLO Clark MD   Consulting Provider: Terri Contreras PA-C  Primary Care Physician: GAVINO Alvarez  Principal Problem:Seizures    Inpatient consult to Neurology  Consult performed by: Terri Contreras PA-C  Consult ordered by: Dereje Araujo PA-C         Subjective:     Chief Complaint:  Recurrent seizures      HPI:   58 y.o. Male with seizure disorder (40+ years) presented to ED 10/21/18 with breakthrough seizures over the weekend. Patients reports seizures began after head trauma from football in teenage years. Has been on numerous AEDs over the years, but unable to list them all. Currently follows with [facility]. Friend who witnessed episodes Friday evening provides description of events: kicking and punching, eyes barely open, unresponsive and one episode of urinary incontinence. Said first event lasted a few minutes and second seizure occurred ~1 hour later, lasting similar duration. After seizures, he has been lethargic and slow. Prior to these episodes, says he has ~1 seizure a year, but per chart review, he was just admitted August 2018 with breakthrough events attributed to medication noncompliance from social issues. Previous hx of being homeless. During last presentation, EEG was remarkable for left sided epileptiform discharges, no sascha seizures. Keppra was increased 500 mg BID to 1 g BID. Numbness/tingling in feet was also addressed and attributed to poor nutritional status and low B12 (324). This admission, labs have been unremarkable for infectious, obvious metabolic derangement. MRI Brain WO contrast was unremarkable. Levetiracetam level pending 10/21. He received 1.5 g of Keppra in ED and resumed on 1 g BID. Hooked up to EEG 10/22 and Neurology consulted for AED  "recommendations.     Past Medical History:   Diagnosis Date    Ex-cigarette smoker     9 years ago    Seizures      History reviewed. No pertinent surgical history.    Review of patient's allergies indicates:  No Known Allergies    Current Neurological Medications:  Keppra 1 g BID    No current facility-administered medications on file prior to encounter.      Current Outpatient Medications on File Prior to Encounter   Medication Sig    levETIRAcetam (KEPPRA) 1000 MG tablet Take 1,000 mg by mouth 2 (two) times daily.     Family History     None        Tobacco Use    Smoking status: Former Smoker     Types: Cigarettes    Smokeless tobacco: Never Used    Tobacco comment: 9 years ago   Substance and Sexual Activity    Alcohol use: Yes     Comment: Former abuser, last drink in Feb    Drug use: Yes     Types: "Crack" cocaine, Cocaine, MDMA (Ecstacy), Marijuana     Comment: Former user    Sexual activity: Not on file     Review of Systems   Constitutional: Positive for activity change and fatigue. Negative for fever.   Neurological: Positive for seizures and numbness. Negative for dizziness, facial asymmetry and headaches.   Psychiatric/Behavioral: Positive for agitation, confusion, decreased concentration and sleep disturbance.     Objective:     Vital Signs (Most Recent):  Temp: 98.8 °F (37.1 °C) (10/22/18 1604)  Pulse: 81 (10/22/18 1604)  Resp: 16 (10/22/18 1604)  BP: (!) 102/57 (10/22/18 1604)  SpO2: 95 % (10/22/18 1604) Vital Signs (24h Range):  Temp:  [97.9 °F (36.6 °C)-98.8 °F (37.1 °C)] 98.8 °F (37.1 °C)  Pulse:  [56-81] 81  Resp:  [13-23] 16  SpO2:  [95 %-98 %] 95 %  BP: (102-138)/(57-74) 102/57     Weight: 76.8 kg (169 lb 5 oz)  Body mass index is 25.74 kg/m².    Physical Exam   Eyes: EOM are normal. Pupils are equal, round, and reactive to light.   Psychiatric: His speech is normal.     NEUROLOGICAL EXAMINATION:     MENTAL STATUS   Oriented to person.   Follows 1 step commands.   Attention: decreased. "   Speech: speech is normal   Level of consciousness: alert  Knowledge: poor.   Able to repeat.     CRANIAL NERVES     CN II   Visual fields full to confrontation.     CN III, IV, VI   Pupils are equal, round, and reactive to light.  Extraocular motions are normal.   Ophthalmoparesis: none    CN V   Facial sensation intact.     CN VII   Facial expression full, symmetric.     CN VIII   Hearing: intact    CN IX, X   Palate: symmetric    CN XI   CN XI normal.     MOTOR EXAM   Overall muscle tone: normal  Right arm pronator drift: absent  Left arm pronator drift: absent    Strength   Right biceps: 5/5  Left biceps: 5/5  Right triceps: 5/5  Left triceps: 5/5  Right interossei: 5/5  Left interossei: 5/5  Right anterior tibial: 5/5  Left anterior tibial: 5/5  Right posterior tibial: 5/5  Left posterior tibial: 5/5    SENSORY EXAM   Right leg light touch: decreased from ankle  Left leg light touch: decreased from ankle  Right leg vibration: decreased from ankle  Left leg vibration: decreased from ankle    GAIT AND COORDINATION     Gait  Gait: (deferred)    Tremor   Resting tremor: absent    Significant Labs:   CBC:   Recent Labs   Lab 10/21/18  2159 10/22/18  0942   WBC 8.17 7.24   HGB 13.2* 13.9*   HCT 40.2 41.2    204     CMP:   Recent Labs   Lab 10/21/18  2159 10/22/18  0942   GLU 93 98    139   K 3.9 4.2    104   CO2 25 28   BUN 21* 14   CREATININE 0.7 0.8   CALCIUM 9.4 9.4   MG 2.4 2.3   PROT 7.2  --    ALBUMIN 3.9  --    BILITOT 0.4  --    ALKPHOS 108  --    AST 17  --    ALT 18  --    ANIONGAP 9 7*   EGFRNONAA >60.0 >60.0     Urine Culture: No results for input(s): LABURIN in the last 48 hours.  Urine Studies:   Recent Labs   Lab 10/21/18  2303   COLORU Straw   APPEARANCEUA Clear   PHUR 5.0   SPECGRAV 1.005   PROTEINUA Negative   GLUCUA Negative   KETONESU Negative   BILIRUBINUA Negative   OCCULTUA 1+*   NITRITE Negative   UROBILINOGEN Negative   LEUKOCYTESUR Negative   RBCUA 1   WBCUA 1   BACTERIA  Rare     Levetiracetam level pending     All pertinent lab results from the past 24 hours have been reviewed.    EEG (10/22/18) results pending     Significant Imaging: I have reviewed and interpreted all pertinent imaging results/findings within the past 24 hours.     MRI Brain WO contrast (10/22/18):  No evidence of diffusion restriction suggest acute infarction. Unremarkable noncontrast MRI of the brain.    Assessment and Plan:     * Seizures    Seizure disorder for 40+ years after head trauma in teenage ages. Seen August 2018 for breakthrough seizures suspected 2/2 noncompliance and d/c on Keppra 1 g BID presents to ED 10/21 after multiple breakthrough seizures this weekend described as unresponsiveness, convulsions, one episode of urinary incontinence and possible tongue bite. Patient denies missing doses of AEDs, but unable to state what he takes at home or who picks up his prescriptions. Levetiracetam level pending. Exam remarkable for agitation concerning for post-ictal psychosis vs. keppra-induced behavioral change. MRI brain WO contrast unremarkable for acute intracranial pathology and EEG (>1 hr) read pending.     Recommendations:  --continue Keppra 1 g BID for now while AED level and EEG results pending  Will consider increasing to 1.5 g BID if level is therapeutic and behavioral change resolves. Alternatively, if patient feels Keppra alters his mood will discuss another AED that is affordable  --Discussed importance of outpatient follow-up. Offered suggestion to use pill box, phone reminders and calendar to improve compliance  --We reviewed [place] state about not driving for 6 months until episode free (although pt states he does not drive) and other seizure precautions including: swimming/bathing alone, climbing, operating heavy machinery and supervising young children alone in case of another episode of loss of consciousness.      VTE Risk Mitigation (From admission, onward)        Ordered     IP VTE LOW  RISK PATIENT  Once      10/22/18 0159     Place JAMES hose  Until discontinued      10/22/18 0159     Place sequential compression device  Until discontinued      10/22/18 0159        Thank you for your consult. I will follow-up with patient. Please contact us if you have any additional questions.    Terri Contreras PA-C  General Neurology Consult  Neuro Consult [facility] # [id number]

## 2018-10-23 NOTE — ASSESSMENT & PLAN NOTE
Keppra 1250 BID at dc  - Keppra level 11   - recurrent seizures in patient with known seizures and questionable compliance   - MRI unremarkable  - neurology consulted and followed

## 2018-10-23 NOTE — DISCHARGE SUMMARY
[facility] Hospital Medicine  Discharge Summary      Patient Name: Terrell Yusuf  MRN: [id number]  Admission Date: 10/21/2018  Hospital Length of Stay: 0 days  Discharge Date and Time: No discharge date for patient encounter.  Attending Physician: Svitlana Costa MD   Discharging Provider: Robe Lewis PA-C  Primary Care Provider: Karissa Yang [facility] Medicine Team: [facility] Robe Lewis PA-C    HPI:   Terrell Yusuf is a 58M with seizures who presents for evaluation of seizure. On admission, no family or friends at bedside. Patient post-ictal, oriented to self and , unreliable historian. HPI per chart review.    Patient presents today with witnessed recurrent seizures over the weekend. Previously admitted in 2018 for the same, was non-compliant with keppra at that time, was found to have left sided epileptiform discharges and his keppra dose was increased to 1000 mg BID. On admit, he reports compliance with keppra 1 pill twice a day unknown dose, doesn't know who picks up Rx, did not follow up with neurology. History of homelessness noted in 2018 - unclear living situation now, doesn't know address. He states he feels shakey and strange but offers no further specifics.     ED: no WBC, afebrile, VSS, UDS negative, EtOH negative, TSH WNL, chemistry WNL, UA clean, MRI negative, KUB with constipation, CXR negative.     * No surgery found *      Hospital Course:   Terrell Yusuf was placed in observation following a witnessed seizure. He was loaded with Keppra in the ED and neurology was consulted. EEG performed. Levetiracetam level resulted low/normal (11). Neurology increased Keppra from 1 g BID to 1250 mg BID. Psychiatry consulted for delusions, believe pt to be of no harm to himself. Ok for discharge from Neurology and psychiatry standpoint.         Consults:   Consults (From admission, onward)        Status Ordering Provider     Inpatient consult to Neurology   Once     Provider:  (Not yet assigned)    Completed THERESA ALCARAZ     Inpatient consult to Psychiatry  Once     Provider:  (Not yet assigned)    Completed EZEQUIEL FUENTES     Inpatient consult to Social Work  Once     Provider:  (Not yet assigned)    Acknowledged LUPILLO BLANCA          * Seizures    Keppra 1500 BID at dc  - Keppra level 11   - recurrent seizures in patient with known seizures and questionable compliance   - MRI unremarkable  - neurology consulted and followed        Final Active Diagnoses:    Diagnosis Date Noted POA    PRINCIPAL PROBLEM:  Seizures [R56.9] 10/22/2018 Yes    Delusions [F22] 10/23/2018 Unknown      Problems Resolved During this Admission:       Discharged Condition: stable    Disposition: Home or Self Care    Follow Up:  Follow-up Information     Schedule an appointment as soon as possible for a visit with [facility] - Neurology.    Specialty:  Neurology  Contact information:  [address]  [facility] [id number] [phone]  Additional information:  7th Floor - Clinic GAVINO Anderson On 10/25/2018.    Specialty:  Family Medicine  Why:  at 11:15 AM  Contact information:  [address] [id number]  [phone]                 Patient Instructions:      Diet Adult Regular     Notify your health care provider if you experience any of the following:  increased confusion or weakness     Notify your health care provider if you experience any of the following:  persistent dizziness, light-headedness, or visual disturbances     No driving until:   Order Comments: Cleared by neurology     Activity as tolerated       Significant Diagnostic Studies: Labs: All labs within the past 24 hours have been reviewed    Pending Diagnostic Studies:     Procedure Component Value Units Date/Time    Lactic acid, plasma [[id number]] Collected:  10/21/18 2159    Order Status:  Sent Lab Status:  In process Updated:  10/21/18 2200    Specimen:  Blood           Medications:  Reconciled Home Medications:      Medication List      START taking these medications    levETIRAcetam 500 MG Tab  Commonly known as:  KEPPRA  Take 3 tablets (1,500 mg total) by mouth 2 (two) times daily.            Indwelling Lines/Drains at time of discharge:   Lines/Drains/Airways          None          Time spent on the discharge of patient: 30 minutes  Patient was seen and examined on the date of discharge and determined to be suitable for discharge.         Robe Lewis PA-C  Department of Hospital Medicine  [facility]

## 2018-10-23 NOTE — HOSPITAL COURSE
Terrell PEDROZA Paramjit was placed in observation following a witnessed seizure. He was loaded with Keppra in the ED and neurology was consulted. EEG performed. Levetiracetam level resulted low/normal (11). Neurology increased Keppra from 1 g BID to 1250 mg BID. Psychiatry consulted for delusions, believe pt to be of no harm to himself. Ok for discharge from Neurology and psychiatry standpoint.

## 2018-10-23 NOTE — PLAN OF CARE
MARQUEZ was informed by ALFRED Lewis ([id number]) that the patient will discharge home today but that his Keppra prescription dose will be increased. CM informed the patient of above. Patient stated that he does not have any money to pay for another Keppra prescription. MARQUEZ requested that Robe send the prescription to [facility] . CM [facility] & requested a call back with a quote for the case management dept to pay for the patient's keppra prescription. CM informed CHELSI Chowdhury ([id number]) that the patient will need assistance with transportation to [facility] following discharge. Will continue to follow.

## 2018-10-23 NOTE — PLAN OF CARE
Problem: Infection, Risk/Actual (Adult)  Goal: Identify Related Risk Factors and Signs and Symptoms  Related risk factors and signs and symptoms are identified upon initiation of Human Response Clinical Practice Guideline (CPG)   10/22/18 6573   Infection, Risk/Actual   Related Risk Factors (Infection, Risk/Actual) chronic illness/condition;exposure to microbes   Signs and Symptoms (Infection, Risk/Actual) lab value changes

## 2018-10-23 NOTE — PLAN OF CARE
CM was informed by Amy ([id number]) w/[facility] that the patient's keppra prescription will cost $1.00. Cost transfer form faxed to [facility] & this CM requested that the prescription be delivered to the patient's bedside. MARQUEZ informed the patient's nurse, Kimberlee ([id number]), of discharge status, prescription delivery, & medicaid transportation arranged. Will continue to follow.

## 2018-10-23 NOTE — PLAN OF CARE
CHELSI informed by CM that pt to d/c today and is in need of transport.  CHELSI contacted pt insurance [facility] to arrange transportation.  CEHLSI spoke with Dawna [phone] who informed SW that transportation ETA is within 3 hours.  Pt will be transported to [address] [facility].  Conformation for transport is: [id number].  No other social needs identified.         Lucia Arellano, MSW, LCSW  [facility]  [id number]
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
## 2018-10-23 NOTE — PLAN OF CARE
Problem: Infection, Risk/Actual (Adult)  Goal: Infection Prevention/Resolution  Patient will demonstrate the desired outcomes by discharge/transition of care.   10/22/18 7652   Infection, Risk/Actual (Adult)   Infection Prevention/Resolution making progress toward outcome

## 2018-10-23 NOTE — HPI
"On my interview Mr. Yusuf is irritable and minimally cooperative responding "I don't know" to most questions. 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 He has been to rehab twice but reports it did not help. Aside from his current low mood related to his hospitalization, he denies present or past symptoms of depression, gladis, anxiety, or PSTD. When asked about VH/AH he admits to hearing and see God and Anders. He denies thought control or thought broadcasting. Regarding his mood and irritability he attributes it to his health (" I thought I was done having seizures.") and his housing situation but he is hopeful because God told him a miracle was coming. He denies SI, hopelessness, or helplessness. He is goal-oriented: wants housing and to start refurbish/"upcycle" furniture which was a previous hobby of his.     Regarding religous delusions: Mr. Yusuf says "I gave my life to God 10 years ago and he put me on the streets." He saved his life and is the reason he is drug free now. Anders instructs him on his diet including avoiding processed food, "mayonaise, mustard, chicken, pork, and beef." His diet includes a variety vegetables, grains, and nuts. Anders has never told him to hurt himself or anyone else, "he wants us all to love each other." He attributes "the father" to the reason he is having memory problems. He says that this writer does not hear or see God because I do not read the [employer] and because god doesn't like cell phones.     Collateral: Humaira - friend for over 1.5 years- she witnessed his most recent seizure leading to admission  She reports that this is patient's baseline. He is a "witness" and will often talk to others about Gods message. He goes to religous service every Sunday and reads " the bible daily since she has known him.  She denies any safety concerns regarding SI/HI or being gravely disabled. She reports he is able to care for himself (spends money appropriately, able to feed himself). She appears to be supportive and he appears to respond well to her. She agreed she would help him call the housing resources provided during this interview.

## 2018-10-23 NOTE — PLAN OF CARE
Problem: Patient Care Overview  Goal: Plan of Care Review  Outcome: Ongoing (interventions implemented as appropriate)  Patient AAOX4 , pt is on 24 hr EEG monitoring. Safety precautions implemented per seizure precautions. Patient is w/o pain/discomfort.

## 2018-10-24 NOTE — NURSING
On the phone with the transport company and was informed the transporter arrived, went into the lobby and inquired about the pt and was told that no one knew about pt, and transport left. This RN told transport company the pt still needs to be picked up and was placed on hold while staff member inquires. Transportation will be provided within the hour.

## 2018-10-24 NOTE — PLAN OF CARE
10/24/18 0544   Final Note   Assessment Type Final Discharge Note     Patient discharged on 10/23/18. Medicaid transportation arranged by CHELSI Chowdhury ([id number]). Discharge summary faxed to MICHAEL Yang (PCP at [facility]) f [phone].

## 2018-10-29 ENCOUNTER — HOSPITAL ENCOUNTER (OUTPATIENT)
Facility: HOSPITAL | Age: 58
Discharge: HOME | DRG: 101 | End: 2018-10-30
Attending: EMERGENCY MEDICINE | Admitting: EMERGENCY MEDICINE
Payer: MEDICAID

## 2018-10-29 DIAGNOSIS — G40.909 SEIZURE DISORDER: ICD-10-CM

## 2018-10-29 DIAGNOSIS — G40.909 RECURRENT SEIZURES: ICD-10-CM

## 2018-10-29 LAB
ALBUMIN SERPL BCP-MCNC: 3.8 G/DL
ALP SERPL-CCNC: 98 U/L
ALT SERPL W/O P-5'-P-CCNC: 15 U/L
ANION GAP SERPL CALC-SCNC: 10 MMOL/L
AST SERPL-CCNC: 16 U/L
BASOPHILS # BLD AUTO: 0.04 K/UL
BASOPHILS NFR BLD: 0.6 %
BILIRUB SERPL-MCNC: 0.6 MG/DL
BUN SERPL-MCNC: 26 MG/DL
CALCIUM SERPL-MCNC: 9.4 MG/DL
CHLORIDE SERPL-SCNC: 106 MMOL/L
CK SERPL-CCNC: 131 U/L
CO2 SERPL-SCNC: 20 MMOL/L
CREAT SERPL-MCNC: 0.8 MG/DL
DIFFERENTIAL METHOD: ABNORMAL
EOSINOPHIL # BLD AUTO: 0.1 K/UL
EOSINOPHIL NFR BLD: 1 %
ERYTHROCYTE [DISTWIDTH] IN BLOOD BY AUTOMATED COUNT: 12.2 %
EST. GFR  (AFRICAN AMERICAN): >60 ML/MIN/1.73 M^2
EST. GFR  (NON AFRICAN AMERICAN): >60 ML/MIN/1.73 M^2
GLUCOSE SERPL-MCNC: 82 MG/DL
HCT VFR BLD AUTO: 38.3 %
HGB BLD-MCNC: 12.3 G/DL
IMM GRANULOCYTES # BLD AUTO: 0.01 K/UL
IMM GRANULOCYTES NFR BLD AUTO: 0.1 %
LYMPHOCYTES # BLD AUTO: 1.6 K/UL
LYMPHOCYTES NFR BLD: 24.6 %
MCH RBC QN AUTO: 30.8 PG
MCHC RBC AUTO-ENTMCNC: 32.1 G/DL
MCV RBC AUTO: 96 FL
MONOCYTES # BLD AUTO: 0.9 K/UL
MONOCYTES NFR BLD: 13.5 %
NEUTROPHILS # BLD AUTO: 4 K/UL
NEUTROPHILS NFR BLD: 60.2 %
NRBC BLD-RTO: 0 /100 WBC
PLATELET # BLD AUTO: 226 K/UL
PMV BLD AUTO: 10.1 FL
POTASSIUM SERPL-SCNC: 4.3 MMOL/L
PROT SERPL-MCNC: 7 G/DL
RBC # BLD AUTO: 3.99 M/UL
SODIUM SERPL-SCNC: 136 MMOL/L
WBC # BLD AUTO: 6.67 K/UL

## 2018-10-29 PROCEDURE — 25000003 PHARM REV CODE 250: Performed by: STUDENT IN AN ORGANIZED HEALTH CARE EDUCATION/TRAINING PROGRAM

## 2018-10-29 PROCEDURE — 80053 COMPREHEN METABOLIC PANEL: CPT

## 2018-10-29 PROCEDURE — 96365 THER/PROPH/DIAG IV INF INIT: CPT

## 2018-10-29 PROCEDURE — 82550 ASSAY OF CK (CPK): CPT

## 2018-10-29 PROCEDURE — G0378 HOSPITAL OBSERVATION PER HR: HCPCS

## 2018-10-29 PROCEDURE — 63600175 PHARM REV CODE 636 W HCPCS: Performed by: STUDENT IN AN ORGANIZED HEALTH CARE EDUCATION/TRAINING PROGRAM

## 2018-10-29 PROCEDURE — 96361 HYDRATE IV INFUSION ADD-ON: CPT

## 2018-10-29 PROCEDURE — 85025 COMPLETE CBC W/AUTO DIFF WBC: CPT

## 2018-10-29 PROCEDURE — 80177 DRUG SCRN QUAN LEVETIRACETAM: CPT

## 2018-10-29 PROCEDURE — 99285 EMERGENCY DEPT VISIT HI MDM: CPT | Mod: 25

## 2018-10-29 PROCEDURE — 99284 EMERGENCY DEPT VISIT MOD MDM: CPT | Mod: ,,, | Performed by: EMERGENCY MEDICINE

## 2018-10-29 PROCEDURE — 80307 DRUG TEST PRSMV CHEM ANLYZR: CPT

## 2018-10-29 RX ORDER — LEVETIRACETAM 15 MG/ML
1500 INJECTION INTRAVASCULAR
Status: COMPLETED | OUTPATIENT
Start: 2018-10-29 | End: 2018-10-29

## 2018-10-29 RX ADMIN — SODIUM CHLORIDE 1000 ML: 0.9 INJECTION, SOLUTION INTRAVENOUS at 09:10

## 2018-10-29 RX ADMIN — LEVETIRACETAM 1500 MG: 15 INJECTION INTRAVENOUS at 09:10

## 2018-10-29 NOTE — PROCEDURES
DATE OF SERVICE:  10/22/2018    EEG #:  FL--2, JR--1    REFERRING PHYSICIAN:  Dr. Obrien.    This EEG  was performed to assess for electrographic seizures.    ICU EEG/VIDEO MONITORING REPORT    METHODOLOGY:  Electroencephalographic (EEG) is recorded with electrodes placed   according to the International 10-20 placement system.  Thirty two (32) channels   of digital signal (sampling rate of 512/sec), including T1 and T2, were   simultaneously recorded from the scalp and may include EKG, EMG, and/or eye   monitors.  Recording band pass was 0.1 to 512 Hz.  Digital video recording of   the patient is simultaneously recorded with the EEG.  The patient is instructed   to report clinical symptoms which may occur during the recording session.  EEG   and video recording are stored and archived in digital format.  Activation   procedures, which include photic stimulation, hyperventilation and instructing   patients to perform simple tasks, are done in selected patients.    The EEG is displayed on a monitor screen and can be reviewed using different   montages.  Computer-assisted analysis is employed to detect spike and   electrographic seizure activity.   The entire record is submitted for computer   analysis.  The entire recording is visually reviewed, and the times identified   by computer analysis as being spikes or seizures are reviewed again.      Compressed spectral analysis (CSA) is also performed on the activity recorded   from each individual channel.  This is displayed as a power display of   frequencies from 0 to 30 Hz over time.   The CSA is reviewed looking for   asymmetries in power between homologous areas of the scalp, then compared with   the original EEG recording.      [employer] software was also utilized in the review of this study.  This software   suite analyzes the EEG recording in multiple domains.  Coherence and rhythmicity   are computed to identify EEG sections which may contain  organized seizures.    Each channel undergoes analysis to detect the presence of spike and sharp waves   which have special and morphological characteristics of epileptic activity.  The   routine EEG recording is converted from special into frequency domain.  This is   then displayed comparing homologous areas to identify areas of significant   asymmetry.  Algorithm to identify non-cortically generated artifact is used to   separate artifact from the EEG.      RECORDING TIMES:  Start on 10/22/2018 at hour 10, minute 14, second 35.  End on 10/23/2018 at hour 7, minute 0, second 5.  Restart on 10/23/2018 at hour 7, minute 0, second 27.  End on 10/23/2018 at hour 16, minute 26, second 34.  Total time of video EEG recording for this study was 29 hours and 32 minutes.    EEG FINDINGS:  The recording was obtained with a number of standard bipolar   referential montages during wakefulness, drowsiness and sleep.  In the alert   state, the posterior background rhythm was a symmetric, well-modulated 10 Hz   alpha rhythm, which reacted symmetrically to eye opening.  Activation procedures   were not performed.  During drowsiness, the background rhythm waxed and waned   and there were periods of slowing.  Bursts of generalized sharply contoured   semi-synchronous mixed theta range activity were noted throughout the record.    During stage II sleep, symmetric V waves, K complexes and sleep spindles were   noted.  Independent left and right temporal focal polymorphic delta and theta   range slowing was noted.  Frequent sharp waves were noted maximally at F7, T3 as   well as F8, T4.    During this study, one electrographic seizure was recorded from hour 12, minute   48, second 44 to hour 12, minute 50, second 1.  Electrographically, the onset   was characterized by diffuse attenuation of the background.  This occurred   during stage II sleep.  The EEG was obscured by motion artifact throughout the   seizure.  Clinically, the  seizure was characterized by abrupt arousal from   wakefulness followed by rapid version to the right followed by repetitive leg   movements with left hand positioned in his groin and right hand next to his   side.  The patient was then seated in a kneel down position with his neck   flexed.  The offset of the seizure was characterized by return to purposeful   behavior.    The EEG data was unavailable until hour 14, minute 20 on 10/23/2018.    The EKG channel revealed sinus rhythm.    IMPRESSION:  This is an abnormal EEG during wakefulness, drowsiness and sleep.    Bursts of generalized mixed sharply contoured semi-synchronous theta activity   were noted.  Independent left and right temporal focal slowing as well as focal   sharp waves were also noted.  One electrographic seizure was recorded with   diffuse attenuation at the onset.    CLINICAL CORRELATION:  The patient is a 58-year-old male with a history of   epilepsy who was previously maintained on levetiracetam.  This is an abnormal   EEG during wakefulness, drowsiness and sleep.  Presence of generalized bursts of   semi-synchronous slowing is suggestive of a mild encephalopathy, nonspecific to   the cause.  The presence of independent left and right temporal focal slowing   is suggestive of focal neuronal dysfunction in these regions.  The presence of   independent left and right temporal focal sharp waves confers an increased risk   of focal seizures from these regions.  During this study, one electrographic   seizure was recorded from stage II sleep characterized by diffuse attenuation of   the background, suggesting deeper epileptogenic foci possibly in the frontal   lobe.  These findings were relayed to the consulting Neurology team.      FAK/HN  dd: 10/23/2018 17:22:32 (CDT)  td: 10/23/2018 18:03:25 (CDT)  Doc ID   #[id number]  Job ID #[id number]    CC:

## 2018-10-30 VITALS
RESPIRATION RATE: 18 BRPM | WEIGHT: 160 LBS | DIASTOLIC BLOOD PRESSURE: 57 MMHG | OXYGEN SATURATION: 95 % | TEMPERATURE: 99 F | BODY MASS INDEX: 22.9 KG/M2 | SYSTOLIC BLOOD PRESSURE: 99 MMHG | HEART RATE: 72 BPM | HEIGHT: 70 IN

## 2018-10-30 LAB
AMPHET+METHAMPHET UR QL: NEGATIVE
ANION GAP SERPL CALC-SCNC: 9 MMOL/L
BARBITURATES UR QL SCN>200 NG/ML: NEGATIVE
BASOPHILS # BLD AUTO: 0.04 K/UL
BASOPHILS NFR BLD: 0.7 %
BENZODIAZ UR QL SCN>200 NG/ML: NEGATIVE
BUN SERPL-MCNC: 19 MG/DL
BZE UR QL SCN: NEGATIVE
CALCIUM SERPL-MCNC: 8.9 MG/DL
CANNABINOIDS UR QL SCN: NEGATIVE
CHLORIDE SERPL-SCNC: 108 MMOL/L
CO2 SERPL-SCNC: 22 MMOL/L
CREAT SERPL-MCNC: 0.6 MG/DL
CREAT UR-MCNC: 54 MG/DL
DIFFERENTIAL METHOD: ABNORMAL
EOSINOPHIL # BLD AUTO: 0.1 K/UL
EOSINOPHIL NFR BLD: 0.9 %
ERYTHROCYTE [DISTWIDTH] IN BLOOD BY AUTOMATED COUNT: 12.2 %
EST. GFR  (AFRICAN AMERICAN): >60 ML/MIN/1.73 M^2
EST. GFR  (NON AFRICAN AMERICAN): >60 ML/MIN/1.73 M^2
GLUCOSE SERPL-MCNC: 86 MG/DL
HCT VFR BLD AUTO: 38.6 %
HGB BLD-MCNC: 12.7 G/DL
IMM GRANULOCYTES # BLD AUTO: 0.01 K/UL
IMM GRANULOCYTES NFR BLD AUTO: 0.2 %
LYMPHOCYTES # BLD AUTO: 1.6 K/UL
LYMPHOCYTES NFR BLD: 28.9 %
MAGNESIUM SERPL-MCNC: 2.3 MG/DL
MCH RBC QN AUTO: 31.1 PG
MCHC RBC AUTO-ENTMCNC: 32.9 G/DL
MCV RBC AUTO: 95 FL
METHADONE UR QL SCN>300 NG/ML: NEGATIVE
MONOCYTES # BLD AUTO: 0.6 K/UL
MONOCYTES NFR BLD: 11.8 %
NEUTROPHILS # BLD AUTO: 3.1 K/UL
NEUTROPHILS NFR BLD: 57.5 %
NRBC BLD-RTO: 0 /100 WBC
OPIATES UR QL SCN: NEGATIVE
PCP UR QL SCN>25 NG/ML: NEGATIVE
PHOSPHATE SERPL-MCNC: 3.5 MG/DL
PLATELET # BLD AUTO: 198 K/UL
PMV BLD AUTO: 10.5 FL
POTASSIUM SERPL-SCNC: 4.2 MMOL/L
RBC # BLD AUTO: 4.08 M/UL
SODIUM SERPL-SCNC: 139 MMOL/L
TOXICOLOGY INFORMATION: NORMAL
WBC # BLD AUTO: 5.44 K/UL

## 2018-10-30 PROCEDURE — 84100 ASSAY OF PHOSPHORUS: CPT

## 2018-10-30 PROCEDURE — 85025 COMPLETE CBC W/AUTO DIFF WBC: CPT

## 2018-10-30 PROCEDURE — 99239 HOSP IP/OBS DSCHRG MGMT >30: CPT | Mod: ,,, | Performed by: HOSPITALIST

## 2018-10-30 PROCEDURE — 12000002 HC ACUTE/MED SURGE SEMI-PRIVATE ROOM

## 2018-10-30 PROCEDURE — G0378 HOSPITAL OBSERVATION PER HR: HCPCS

## 2018-10-30 PROCEDURE — 25000003 PHARM REV CODE 250: Performed by: HOSPITALIST

## 2018-10-30 PROCEDURE — 25000003 PHARM REV CODE 250: Performed by: PHYSICIAN ASSISTANT

## 2018-10-30 PROCEDURE — 80048 BASIC METABOLIC PNL TOTAL CA: CPT

## 2018-10-30 PROCEDURE — 99221 1ST HOSP IP/OBS SF/LOW 40: CPT | Mod: ,,, | Performed by: PHYSICIAN ASSISTANT

## 2018-10-30 PROCEDURE — 99233 SBSQ HOSP IP/OBS HIGH 50: CPT | Mod: ,,, | Performed by: PSYCHIATRY & NEUROLOGY

## 2018-10-30 PROCEDURE — 83735 ASSAY OF MAGNESIUM: CPT

## 2018-10-30 RX ORDER — SODIUM CHLORIDE 0.9 % (FLUSH) 0.9 %
5 SYRINGE (ML) INJECTION
Status: DISCONTINUED | OUTPATIENT
Start: 2018-10-30 | End: 2018-10-30 | Stop reason: HOSPADM

## 2018-10-30 RX ORDER — LEVETIRACETAM 500 MG/1
1000 TABLET ORAL 2 TIMES DAILY
Status: DISCONTINUED | OUTPATIENT
Start: 2018-10-30 | End: 2018-10-30

## 2018-10-30 RX ORDER — IBUPROFEN 200 MG
16 TABLET ORAL
Status: DISCONTINUED | OUTPATIENT
Start: 2018-10-30 | End: 2018-10-30 | Stop reason: HOSPADM

## 2018-10-30 RX ORDER — LEVETIRACETAM 1000 MG/1
1500 TABLET ORAL 2 TIMES DAILY
Qty: 90 TABLET | Refills: 2 | Status: ON HOLD | OUTPATIENT
Start: 2018-10-30 | End: 2018-11-28 | Stop reason: HOSPADM

## 2018-10-30 RX ORDER — ACETAMINOPHEN 325 MG/1
650 TABLET ORAL EVERY 4 HOURS PRN
Status: DISCONTINUED | OUTPATIENT
Start: 2018-10-30 | End: 2018-10-30 | Stop reason: HOSPADM

## 2018-10-30 RX ORDER — LEVETIRACETAM 500 MG/1
1500 TABLET ORAL 2 TIMES DAILY
Status: DISCONTINUED | OUTPATIENT
Start: 2018-10-30 | End: 2018-10-30

## 2018-10-30 RX ORDER — IPRATROPIUM BROMIDE AND ALBUTEROL SULFATE 2.5; .5 MG/3ML; MG/3ML
3 SOLUTION RESPIRATORY (INHALATION) EVERY 4 HOURS PRN
Status: DISCONTINUED | OUTPATIENT
Start: 2018-10-30 | End: 2018-10-30 | Stop reason: HOSPADM

## 2018-10-30 RX ORDER — KETOROLAC TROMETHAMINE 30 MG/ML
15 INJECTION, SOLUTION INTRAMUSCULAR; INTRAVENOUS EVERY 6 HOURS PRN
Status: DISCONTINUED | OUTPATIENT
Start: 2018-10-30 | End: 2018-10-30 | Stop reason: HOSPADM

## 2018-10-30 RX ORDER — ONDANSETRON 4 MG/1
4 TABLET, ORALLY DISINTEGRATING ORAL EVERY 8 HOURS PRN
Status: DISCONTINUED | OUTPATIENT
Start: 2018-10-30 | End: 2018-10-30 | Stop reason: HOSPADM

## 2018-10-30 RX ORDER — PROMETHAZINE HYDROCHLORIDE 25 MG/1
25 TABLET ORAL EVERY 6 HOURS PRN
Status: DISCONTINUED | OUTPATIENT
Start: 2018-10-30 | End: 2018-10-30 | Stop reason: HOSPADM

## 2018-10-30 RX ORDER — RAMELTEON 8 MG/1
8 TABLET ORAL NIGHTLY PRN
Status: DISCONTINUED | OUTPATIENT
Start: 2018-10-30 | End: 2018-10-30 | Stop reason: HOSPADM

## 2018-10-30 RX ORDER — LEVETIRACETAM 500 MG/1
1000 TABLET ORAL 2 TIMES DAILY
Status: DISCONTINUED | OUTPATIENT
Start: 2018-10-30 | End: 2018-10-30 | Stop reason: HOSPADM

## 2018-10-30 RX ORDER — IBUPROFEN 200 MG
24 TABLET ORAL
Status: DISCONTINUED | OUTPATIENT
Start: 2018-10-30 | End: 2018-10-30 | Stop reason: HOSPADM

## 2018-10-30 RX ORDER — AMOXICILLIN 250 MG
1 CAPSULE ORAL 2 TIMES DAILY PRN
Status: DISCONTINUED | OUTPATIENT
Start: 2018-10-30 | End: 2018-10-30 | Stop reason: HOSPADM

## 2018-10-30 RX ORDER — GLUCAGON 1 MG
1 KIT INJECTION
Status: DISCONTINUED | OUTPATIENT
Start: 2018-10-30 | End: 2018-10-30 | Stop reason: HOSPADM

## 2018-10-30 RX ORDER — MORPHINE SULFATE 2 MG/ML
2 INJECTION, SOLUTION INTRAMUSCULAR; INTRAVENOUS
Status: DISCONTINUED | OUTPATIENT
Start: 2018-10-30 | End: 2018-10-30

## 2018-10-30 RX ADMIN — SODIUM CHLORIDE, SODIUM LACTATE, POTASSIUM CHLORIDE, AND CALCIUM CHLORIDE 1000 ML: .6; .31; .03; .02 INJECTION, SOLUTION INTRAVENOUS at 09:10

## 2018-10-30 RX ADMIN — LEVETIRACETAM 1000 MG: 500 TABLET ORAL at 09:10

## 2018-10-30 NOTE — ASSESSMENT & PLAN NOTE
57 y/o male with a hx of seizures presenting with breakthrough seizures while on Keppra (1500 mg BID). DDX includes: Refractory seizures, subtherapeutic Keppra level, med noncompliance, insomnia, metabolic derangements. Patient has been reported to be non-compliant with medications during his prior admission, and given his recent stint of insomnia/possible manic episode, there seems to be a reasonable etiology for this most recent seizure activity. Patient's lab-work is unremarkable to suggest a metabolic cause. UDS in ED was negative.     Recommendations:  - Would hold off on performing continuous EEG during this admission, given patient's EEG in 08/2018 that showed some epileptic waveforms. This patient's recent seizure activity is likely a combination of insomnia and medication non-compliance w/ keppra. Unlikely to show new changes now.   - Spoke to patient about seizure precautions, including absolutely no driving in the [facility] for 6 months. Patient expressed understanding  - Continue Keppra 1500 mg BID, with a stress on compliance  - Follow up in Epilepsy clinic

## 2018-10-30 NOTE — NURSING
Patient A&Ox4 and aware of waiting on transportation from [facility]. Will discharge patient out to waiting room as he waits for ride. Patient left ED in stable condition with friend.

## 2018-10-30 NOTE — ED PROVIDER NOTES
"Encounter Date: 10/29/2018       History     Chief Complaint   Patient presents with    Seizures     Pt had witnessed seizure by friend. Pt denies head injury. Upon EMS arrival, pt was awake but confused. Pt has history of sseizures, compliant with medications. Pt also had one other seizure today. Pt AAX4 and answering questions appropriatley. Pt had episode of urine incontenince.     HPI   58 y.o. M p/w seizures, witness by friend. Takes keppra 1,500 mg bid at home, since he was discharged earlier this month. Compliant with meds (has bottle with him).  Patient remembers talking to his friend (Humaira), lost consciousness, then remembers sitting on the ground.   Humaira says that she noticed Mr. Yusuf begin to pick at his hip with his right arm, was a little rigid on that side "as if he was fighting something," and had issues walking/standing. He eventually fell back , safely, and ended up sitting down, no head trauma or body injuries. Seizure activity lasted about 5 minutes, with about 10 min post-ictal state and urinary incontinence. He had two seizures earlier today, of similar characteristics.  Humiara witnessed his seizures from his most previous admission, and says that those were "grand mal," whereas these are definitely different.   Pt says that he has been eating and staying hydrated. Denies recent illnesses. No EtOH or cocaine (he quit 1 year ago).  A few days ago, he had issues with sleep, and stayed up for 48 hours straight, for unknown reason; he just couldn't sleep.  MRI brain non-con from 10/22/18 showed no acute stroke, and was otherwise unremarkable.    Review of patient's allergies indicates:    No Known Allergies  Past Medical History:   Diagnosis Date    Seizures      No past surgical history on file.  No family history on file.  Social History     Tobacco Use    Smoking status: Former Smoker    Smokeless tobacco: Never Used   Substance Use Topics    Alcohol use: Yes    Drug use: No     Review " of Systems   Constitutional: Negative for chills, diaphoresis and fever.   HENT: Negative for rhinorrhea, sore throat and trouble swallowing.    Eyes: Negative for photophobia, pain and visual disturbance.   Respiratory: Negative for cough, chest tightness and shortness of breath.    Cardiovascular: Negative for chest pain, palpitations and leg swelling.   Gastrointestinal: Negative for abdominal pain, blood in stool, constipation, nausea and vomiting.   Genitourinary: Negative for dysuria, frequency and hematuria.        +incontinence, per friend   Musculoskeletal: Negative for back pain and neck stiffness.   Skin: Negative for rash and wound.   Neurological: Positive for seizures. Negative for syncope, weakness, light-headedness and headaches.   Psychiatric/Behavioral: Positive for sleep disturbance. Negative for hallucinations.   All other systems reviewed and are negative.      Physical Exam     Initial Vitals [10/29/18 1930]   BP Pulse Resp Temp SpO2   118/76 76 -- 98.5 °F (36.9 °C) 100 %      MAP       --         Physical Exam    Nursing note and vitals reviewed.  Constitutional: He appears well-developed and well-nourished. He is not diaphoretic. No distress.   HENT:   Head: Normocephalic and atraumatic.   Nose: Nose normal.   Mouth/Throat: Oropharynx is clear and moist.   Eyes: Conjunctivae and EOM are normal. Pupils are equal, round, and reactive to light.   Neck: Normal range of motion. Neck supple.   Cardiovascular: Normal heart sounds and intact distal pulses.   No murmur heard.  Pulses:       Dorsalis pedis pulses are 2+ on the right side, and 2+ on the left side.   Pulmonary/Chest: Breath sounds normal. He exhibits no tenderness.   Abdominal: Soft. Normal appearance and bowel sounds are normal. He exhibits no distension. There is no tenderness.   Musculoskeletal: Normal range of motion. He exhibits no edema or tenderness.   Neurological: He is alert and oriented to person, place, and time. He exhibits  "normal muscle tone.   Cerebellar exam normal (finger-to-nose, heel-to-shin). No pronator drift. No nystagmus. Strength 5/5 throughout. Sensation to light touch intact in face and all extremities.    Skin: Skin is warm and dry. No pallor.   No abrasions.   Psychiatric: Thought content normal.   No hallucinations.         ED Course   Procedures  Labs Reviewed - No data to display        HO-III MDM  58 y.o.male presents with seizures, of a different characteristic, today x3. Hx of seizures and taking Keppra 1500 bid. Phys exam was unremarkable..   DDX includes: Refractory seizures, subtherapeutic keppra, med noncompliance, insomnia  Work up and treatment include basic labs, CK, Keppra 1500 mg loading, along with keppra serum level.    Pt is aware of plan and is amenable.     "VAUGHN Zimmer M.D.  [facility] EMERGENCY MEDICINE PGY-2  8:42 PM 10/29/2018    HO-III UPDATE  Neurology staff contacted after troubleshooting the on-call phone numbers with the .  Continuous EEG is recommended, even considering Mr. Yusuf's most recent work up.    Results reviewed:  CMP does not elucidate lytes as being the etiology of his sx's.   Patient has had no seizures in ED.   Will admit to internal medicine, Dr. Washburn for EEG.  Pt is aware of plan and is amenable.     "VAUGHN Zimmer M.D.  [facility] EMERGENCY MEDICINE PGY-2  12:57 AM 10/30/2018        Imaging Results    None                               Clinical Impression:   The encounter diagnosis was Seizure disorder.                             Kamlesh Zimmer MD  Resident  10/30/18 0059    "

## 2018-10-30 NOTE — DISCHARGE INSTRUCTIONS
Epilepsy: How Seizures Affect the Body  The brain is the control center of your body. It manages everything from movement and balance to emotions and memory. When a seizure happens, some or all brain functions are temporarily affected.        Normal EEG       Partial seizure EEG       Generalized seizure EEG      The brain working normally  The brain uses electrical signals to send messages throughout your body. Signals sent from different parts of your brain control different body functions. For instance, 1 part of your brain controls balance. Another part controls speech. A healthcare provider can record brain signals using a test called an electroencephalogram (EEG).  The brain during a seizure  During a seizure, excessive electrical signals in your brain disrupt its normal activity. The way this affects body functions depends on 2 main factors. First is the location of the seizure in your brain. For instance, a seizure in a part of your brain that controls movement might cause your arm or leg to jerk. The second factor is the type of seizure. For instance, a seizure that affects more of your brain may affect more of your body.  Types of seizures  The 2 main types of seizures are partial and generalized. A partial seizure can become generalized.  Partial seizures (do not involve the whole brain)  Also called focal seizures, these seizures start in 1 part of your brain. There are 2 types:  · Simple partial seizures. These may start with an aura, or warning. Auras are an unusual sense or feeling that indicates that a seizure will soon happen. Auras can involve strange tastes or smells, stomach upset, or a feeling of fear or déjà vu. Simple partial seizures may also involve jerking movements or hallucinations. You are awake and aware that you are having a simple seizure.  · Complex partial seizures. These may also start with an aura. You may become motionless and have a vacant stare. Or you may perform  automatisms. These are repeated movements, such as smacking lips or gesturing. A complex seizure means there is a change or loss of consciousness. If you have a complex seizure, you may be awake but unaware of the seizure.  Generalized seizures (involves the whole brain)  These seizures affect your entire brain at once. They usually start happening at a young age. The most common types of generalized seizures are:  · Absence seizures (petit mal seizures). These seizures involve a brief lapse of awareness. Signs can include staring, eye blinking, and lip smacking.  · Tonic-clonic seizures (grand mal seizures). These may be the best known type of seizure. You lose consciousness and may fall to the ground. Your body stiffens and then convulses, with your arms and legs jerking rhythmically.  · Myoclonic seizures. These seizures involve brief jerking movements. They usually affect both sides of your body.  · Atonic seizures (drop attacks). During these seizures, you lose all muscle control and may fall or slump over.  Date Last Reviewed: 9/8/2015 © 2000-2017 [employer]. [address] [place] [address]. All rights reserved. This information is not intended as a substitute for professional medical care. Always follow your healthcare professional's instructions.          Treating Epilepsy: Medicines     Take your medicines exactly as directed by your healthcare provider.     If youve been diagnosed with epilepsy, your healthcare provider will create a treatment plan for you. Medicines called antiepileptic drugs (AEDs) are the primary treatment for epilepsy. These medicines greatly reduce or prevent seizures in most people who take them. For some people, other treatment choices may be available.  Your medicine plan  Your healthcare provider will work with you to create the best medicine plan for you:  · Type of medicine. There are many types of AEDs. The first type you try will likely help you.  If not, your healthcare provider may suggest another type, or a combination of AEDs.  · Dosage. You will probably be started at a low dosage. The dosage will be slowly increased until your seizures are better controlled or a target dosage is reached.  · Rescue medicines. Your treatment plan may include special medicines to stop seizures. They can be given to you during a seizure only by someone who has been specially instructed by a healthcare provider.  After you start taking medicines, you may have follow-up testing. These tests measure the level of medicine in your blood. Eventually, youll need to have these tests periodically, although certain medicines do not require lab testing. Additionally, your healthcare provider may need to check certain blood tests to monitor for side effects while on AEDs.  When taking epilepsy medicines  DO take your medicines exactly as directed.  DO keep a current list of all medicines youre taking and show it to your healthcare provider. Make sure you show the list and ask about interactions with any healthcare provider prescribing new medicine for you.  DO know that certain epilepsy medicines can interfere with how birth control pills work.  DO store pills in a cool, dry place (not in the bathroom).  DONT stop taking your medicines, skip a dose, or change your medicine amount without your healthcare providers approval.  DONT change brands of medicine (usually generic medicines are OK), or even forms of 1 brand (from tablet to liquid, for instance), without your healthcare provider's approval.  DONT take herbal supplements or antacids without talking to your healthcare provider first. Ask your pharmacist about taking over-the-counter medicines.  Possible side effects of epilepsy medicines  Epilepsy medicines often have effects that are not intended (side effects). Most of these effects go away after a few weeks. The most common side effects of epilepsy medicines  include:  · Dizziness  · Trouble concentrating  · Tiredness  · Stomach upset  · Weight gain or loss  · Depression  · Allergic reaction (like a rash or fever)  Other treatments for epilepsy  Brain surgery. Brain surgery may sound scary, but it may be a choice if you are still having seizures while on medicine. It can greatly reduce or eliminate seizures, without causing loss of function. It impacts small parts of your brain that cause seizures, leaving the rest of your brain unharmed. In most cases, only people whose seizures start as partial seizures can have the procedure.  Vagus nerve stimulation. With vagus nerve stimulation (VNS), a device is placed under the skin in your chest. The device is connected to a nerve in your neck called the vagus nerve. The device sends electrical impulses through your vagus nerve to your brain. The impulses have been shown to help reduce seizures.  Brain stimulation. A newer device is now available to stimulate a part of the brain to prevent a seizure from spreading. This is not for all types of seizures and only for adults with epilepsy.   Date Last Reviewed: 9/8/2015 © 2000-2017 [employer]. [address] [place] [address]. All rights reserved. This information is not intended as a substitute for professional medical care. Always follow your healthcare professional's instructions.        Recurrent Seizure (Adult)    You have had another seizure today. A common cause of seizures that keep happening (recurrent seizures) is missing doses of seizure medicine. But sometimes seizures are hard to control even when you take the medicine correctly. If this is the case for you, your healthcare provider may need to increase your dosage. Or you may need to add or change to another medicine.  Home care  Follow these tips when caring for yourself at home. For this seizure:  · Seizures arent predictable. So avoid doing anything that might cause danger to you or other  "people if you have another seizure. Until the seizures are under good control, take these precautions:  ¨ Dont drive, ride a motorcycle, or ride a bike.  ¨ Dont operate dangerous equipment such as power tools  ¨ Take showers instead of baths.  ¨ Dont swim or climb ladders, trees, or roofs.  · Tell your close friends and relatives about your seizure. Teach them what to do for you if it happens again.  · If medicine was prescribed to prevent seizures, take it exactly as directed. It does not work when taken "as needed." Missing doses will increase the risk of having another seizure.  · If you miss a dose, take the missed dose as soon as you remember. If it is almost time for your next dose, skip the missed dose. Restart the medicine at your next scheduled time. Dont take extra medicine to make up the missed dose.  · Wear a "Medic-Alert" bracelet to let emergency personnel know about your condition.  · Follow a regular sleep schedule such that you get at least 6 to 8 hours of restful sleep every night. This is especially important when you are sick with a cold or flu and/or another type of infection.  For future seizures, if you are alone:  If you feel a seizure coming on, lie down on a bed or on the floor with something soft under your head. Lie on your left side, not on your back. This will keep you from falling. It will also let fluid drain out of your mouth and prevent choking. Be sure you are clear of any objects that might injure you during the seizure. Call for help if there is time.  For future seizures, if someone is with you:  The person should help you get into a safe position and call for help. The person shouldnt try to force anything in your mouth once the seizure begins. This could harm your teeth or jaw.  Follow-up care  Follow up with your healthcare provider. Keep a seizure calendar to record how often you have a seizure. If you are being started on anti-seizure medicine, make sure that you use " additional birth control. Seizure medicine can affect how well birth control pills work, and you could become pregnant. Avoid alcohol until your doctor tells you its OK.  Note: For the safety of yourself and others on the road, certain states require that the treating doctor tell the Public Health Department about any adult who is treated for a seizure and is at risk of more seizures. In this case, the Department of Motor Vehicles will be told. A restriction will be put on your s license until a doctor gives you medical clearance to drive again. Contact your treating doctor to find out if your state requires the reporting of patients with a seizures condition.  When to seek medical advice  Call your healthcare provider right away if any of these occur:  · Seizures happen more often or last longer than usual  · A seizure lasts over 5 minutes  · You dont wake up between seizures  · Confusion that lasts more than 30 minutes after a seizure  · Injury during a seizure  · Fever over 100.4ºF (38.0ºC), or as advised  · Unusual irritability, drowsiness, or confusion  · Stiff or painful neck  · Headache that gets worse   Date Last Reviewed: 8/1/2016  © 3757-0139 [employer]. [address] [place] [address]. All rights reserved. This information is not intended as a substitute for professional medical care. Always follow your healthcare professional's instructions.      Living Well with Epilepsy  People with epilepsy can lead healthy, productive lives. Life with epilepsy can be challenging, but there are things you can do to make it easier. For example, you can pay attention to your emotions. If you feel down, upset, or scared, talk to your healthcare provider. And be open with the people in your life. Talking about epilepsy can help them understand. It can also help you feel better.  Coping with emotions  You may be scared to go out in public for fear of having a seizure. Or you may just get  frustrated with having epilepsy. Such feelings are normal. But they can lead to anxiety and depression. Treatment is available for these conditions, so talk to your healthcare provider. Discuss what can help you, such as the following:  · Support groups give you the chance to talk with other people who have epilepsy.  · Counseling helps you learn to cope with your emotions and health problems.  · Medicine can help if you have a mood disorder.     Recognizing signs of depression  Depression is an illness that affects your thoughts and feelings. It can be caused by trouble coping with epilepsy, and sometimes it may be caused by the medicines used to treat it. Depression can be serious. If you have any of the following, call your healthcare provider:  · Feeling down most of the time  · Feeling hopeless or helpless  · Losing pleasure in things you used to enjoy  · Sleeping less or more than usual  · Having a big change in appetite or weight  · Having trouble focusing, remembering, or making decisions  · Isolating yourself from friends or family    Coping at home  Epilepsy affects those around you, too. Talk with your loved ones and learn their concerns. For instance, your children may be afraid for your safety. Reassure them that you can live a long, healthy life with epilepsy. Your partner may wonder if a normal sex life is possible. Let him or her know that epilepsy doesnt have to affect intimacy. If loved ones have questions, you can always arrange a talk with your healthcare provider.  Epilepsy and your job  Epilepsy doesnt have to keep you from working. In fact, people with epilepsy hold many kinds of jobs. But there are some issues you should consider, such as:  · What kind of work can I do? This depends on several things, like how well controlled your seizures are. Also consider whether the job involves tasks that may not be safe for you. These include driving or operating heavy machinery.  · Should I tell my  boss or coworkers about my epilepsy? This is your personal choice. But you may be safer if people at your workplace are prepared to respond to a seizure. If you are concerned about losing your job, know your rights. The Americans with Disabilities Act provides work-related protections for people with epilepsy.  Date Last Reviewed: 9/10/2015  © 8857-4174 [employer]. [address] [place] [address]. All rights reserved. This information is not intended as a substitute for professional medical care. Always follow your healthcare professional's instructions.

## 2018-10-30 NOTE — ED NOTES
Side rails padded.Pt in bed, awake and alert. Respirations are even and unlabored on room air. No distress is noted. Bed in low, locked position with side rails upx2. Call light is within reach. Will continue to monitor

## 2018-10-30 NOTE — ASSESSMENT & PLAN NOTE
- Suspect noncompliance; seizure activity appears similar to previous  - Previous admissions for seizures, previously reports noncompliance  - Previous CTs and MRIs with no acute abnormalities  - Neuro consulted and appreciate recs  - continuous EEG  - keppra level pending  - UDS negative.

## 2018-10-30 NOTE — ED TRIAGE NOTES
58 yr old male pt presents to the ed with complaints of seizure activty last witnessed by friend today last 2-3 minutes. Pt has no trauma no head injury but did has an event of urinary incontinence. Pt is aox 3 at this moment and expresses compliance with medications.

## 2018-10-30 NOTE — SUBJECTIVE & OBJECTIVE
"Past Medical History:   Diagnosis Date    Seizures        History reviewed. No pertinent surgical history.    Review of patient's allergies indicates:  No Known Allergies    No current facility-administered medications on file prior to encounter.      Current Outpatient Medications on File Prior to Encounter   Medication Sig    levETIRAcetam (KEPPRA) 1000 MG tablet Take 1 tablet (1,000 mg total) by mouth 2 (two) times daily.    levETIRAcetam (KEPPRA) 1000 MG tablet Take 1 tablet (1,000 mg total) by mouth 2 (two) times daily.     Family History     None        Tobacco Use    Smoking status: Former Smoker    Smokeless tobacco: Never Used   Substance and Sexual Activity    Alcohol use: Yes    Drug use: No    Sexual activity: Not on file     Review of Systems   Constitutional: Negative for chills and fever.   HENT: Negative for ear pain and sore throat.    Eyes: Negative for pain and visual disturbance.   Respiratory: Negative for cough and shortness of breath.    Cardiovascular: Negative for chest pain and palpitations.   Gastrointestinal: Negative for abdominal pain, diarrhea, nausea and vomiting.   Endocrine: Negative for heat intolerance and polydipsia.   Genitourinary: Negative for dysuria, frequency and urgency.   Musculoskeletal: Negative for back pain and gait problem.   Skin: Negative for rash.   Neurological: Positive for seizures. Negative for dizziness, weakness and headaches.   Psychiatric/Behavioral: Positive for hallucinations (None reported, but possibly auditory from "Anders") and sleep disturbance. Negative for confusion, self-injury and suicidal ideas. The patient is not nervous/anxious.      Objective:     Vital Signs (Most Recent):  Temp: 98.5 °F (36.9 °C) (10/29/18 1930)  Pulse: 62 (10/29/18 2249)  Resp: 20 (10/29/18 2249)  BP: 102/70 (10/29/18 2249)  SpO2: 97 % (10/29/18 2249) Vital Signs (24h Range):  Temp:  [98.5 °F (36.9 °C)] 98.5 °F (36.9 °C)  Pulse:  [62-76] 62  Resp:  [20] 20  SpO2:  " [97 %-100 %] 97 %  BP: (102-118)/(70-76) 102/70     Weight: 72.6 kg (160 lb)  Body mass index is 22.96 kg/m².    Physical Exam   Constitutional: He is oriented to person, place, and time. He appears well-developed and well-nourished.   Agitated male in NAD with L hand covering face   HENT:   Head: Normocephalic and atraumatic.   Eyes: Conjunctivae and EOM are normal. Pupils are equal, round, and reactive to light.   Neck: Normal range of motion. Neck supple.   Cardiovascular: Normal rate, regular rhythm, normal heart sounds and intact distal pulses.   No murmur heard.  Pulmonary/Chest: Effort normal and breath sounds normal. He has no rales. He exhibits no tenderness.   Abdominal: Soft. Bowel sounds are normal. He exhibits no distension. There is no tenderness.   Musculoskeletal: Normal range of motion.   STR equal and bilateral to extremities   Neurological: He is alert and oriented to person, place, and time.   No pronator drift  No facial droop  No gross neural deficits   Skin: Skin is warm and dry.   Psychiatric: His speech is normal. His affect is labile. He is agitated. Thought content is delusional. Thought content is not paranoid. Cognition and memory are impaired. He does not express impulsivity. He expresses no homicidal and no suicidal ideation. He expresses no suicidal plans and no homicidal plans.   Bizarre behavior   Nursing note and vitals reviewed.        CRANIAL NERVES     CN III, IV, VI   Pupils are equal, round, and reactive to light.  Extraocular motions are normal.        Significant Labs:   CBC:   Recent Labs   Lab 10/29/18  2056   WBC 6.67   HGB 12.3*   HCT 38.3*        CMP:   Recent Labs   Lab 10/29/18  2056      K 4.3      CO2 20*   GLU 82   BUN 26*   CREATININE 0.8   CALCIUM 9.4   PROT 7.0   ALBUMIN 3.8   BILITOT 0.6   ALKPHOS 98   AST 16   ALT 15   ANIONGAP 10   EGFRNONAA >60.0       Significant Imaging: I have reviewed all pertinent imaging results/findings within the  past 24 hours.

## 2018-10-30 NOTE — HPI
"58 y.o. male with a hx of seizure disorder [place] [facility] [place] seizures, as witnessed by a friend over the weekend. The patient is a poor historian and cannot provide specific details of his most recent seizure episodes or much of his past medical hx for that matter as he claims to have memory issues. Patient remembers talking to his friend (Humaira), lost consciousness, and then was sitting on the ground after regaining consciousness. Patient reports that he takes keppra 1,500 mg BID at home, however, the patient is currently homeless (living in a tent on the street) and prior notes from August admission report inability to afford his medications. He does have his bottle of Keppra with him in ED now though, so true compliance is unclear.     Per friend Humaira at bedside,she noticed Mr. Yusuf begin to pick at his hip with his right arm, was a little rigid on that side "as if he was fighting something," and had issues walking/standing. He eventually fell back , safely, and ended up sitting down, no head trauma or bodily injuries. Seizure activity lasted about 5 minutes, with about 10 min post-ictal state (until EMS arrived) and urinary incontinence  Denies tongue biting. She reports that the patient had 2 other unwitnessed seizures prior to presenting to ED. Humaira witnessed his seizures from his admission earlier this month and says that those were "grand mal," whereas these were a different type. Pt says that he has been eating and staying hydrated. Denies recent illnesses. No EtOH or cocaine (he quit 1 year ago). A few days ago, he had issues with sleep, and stayed up for 48 hours straight, for unknown reason; he reports that he just couldn't sleep. Humaira reports that he was in high spirits, very joyful and hyper-verbal during this time, and stating that he "doesn't need to sleep." Supposedly had a prior episode in the past in which he quit his medications. In regards to his current social situation, " "he does state that Anders has a plan for him and saved him from cocaine abuse. Of note, psych was consulted on last admission for delusions/hallucinations ("Anders told me to stop taking my medications and would cure my seziures") but patient was deemed appropriate and discharged home.      Patient was previously admitted on 08/2018 for a similar presentation, was noted to be non-compliant with keppra at that time.  On 8/2018 admission, was found to have left sided epileptiform discharges and his keppra dose was increased to 1000 mg BID. MRI brain non-con from 08/03/18 showed no acute stroke, and was otherwise unremarkable.     In ED, UDS negative, . ED MD spoke with neurology who wanted patient admitted for 24 hour EEG monitoring.          "

## 2018-10-30 NOTE — HPI
"58 y.o. M p/w recurrent seizures, witnessed by friend. He reports that he takes keppra 1,500 mg bid at home, since he was discharged earlier this month. Compliant with meds (has bottle with him).  Patient does not recall any of the events today, but reports that he ate one meal and took his morning dose of keppra.  When asked to inform me of the last thing he remembered happening today before he arrived, he became upset and agitated and raised his voice saying "I have memory issues, okay!".      Per friend Humaira at bedside, patient was standing when he stared off, then started with BUE tremors/shaking. No tongue biting.  She reports that she lowered him to the ground, where he continued to have seziure acitivty for 2 more minutes, with urinary incontinence.  He was postictal for several minutes until EMS showed up.His friend humaira states that he had two seizures earlier today but was unable to characterize because she did not witness them.  Humaira witnessed his seizures from his most previous admission, and says that those were "grand mal," whereas these are definitely different.  Pt says that he has been eating and staying hydrated. Denies recent illnesses. No EtOH or cocaine (he quit 1 year ago).  A few days ago, he had issues with sleep, and stayed up for 48 hours straight, for unknown reason; he just couldn't sleep. Pt intermittently refused to answer my questions, but denies HI/SI, depression/anxiety AVH.  He does state that Anders has a plan for him and saved him from cocaine abuse.  Patient has bottle of keppra at bedside from August admission with both yellow and white pills.  He has been taking three of the yellow pills for his seizures (500 mg tablets), but not the white ones.    Patient presents today with witnessed recurrent seizures over the weekend. Previously admitted in 08/2018 and 10/21 for the same, was non-compliant with keppra at that time.  On 8/2018 admission, was found to have left sided " "epileptiform discharges and his keppra dose was increased to 1000 mg BID. MRI brain non-con from 10/22/18 showed no acute stroke, and was otherwise unremarkable.    Of note, psych consulted on last admission for delusions/hallucinations ("Anders told me to stop taking my medications") but patient deemed appropriate, and discharged home.     In ED, UDS negative, .  EDMD spoke with neurology who wanted patient admitted for 24 hour EEG monitoring.     "

## 2018-10-30 NOTE — SUBJECTIVE & OBJECTIVE
"Past Medical History:   Diagnosis Date    Seizures        History reviewed. No pertinent surgical history.    Review of patient's allergies indicates:  No Known Allergies    Current Neurological Medications:     No current facility-administered medications on file prior to encounter.      Current Outpatient Medications on File Prior to Encounter   Medication Sig    levETIRAcetam (KEPPRA) 1000 MG tablet Take 1 tablet (1,000 mg total) by mouth 2 (two) times daily.    levETIRAcetam (KEPPRA) 1000 MG tablet Take 1 tablet (1,000 mg total) by mouth 2 (two) times daily.     Family History     None        Tobacco Use    Smoking status: Former Smoker    Smokeless tobacco: Never Used   Substance and Sexual Activity    Alcohol use: Yes    Drug use: No    Sexual activity: Not on file     Review of Systems   Constitutional: Negative for chills and fever.   HENT: Negative for ear pain and sore throat.    Eyes: Negative for pain and visual disturbance.   Respiratory: Negative for cough and shortness of breath.    Cardiovascular: Negative for chest pain and palpitations.   Gastrointestinal: Negative for abdominal pain, diarrhea, nausea and vomiting.   Endocrine: Negative for heat intolerance and polydipsia.   Genitourinary: Negative for dysuria, frequency and urgency.   Musculoskeletal: Negative for back pain and gait problem.   Skin: Negative for rash.   Neurological: Positive for seizures. Negative for dizziness, weakness and headaches.   Psychiatric/Behavioral: Positive for hallucinations (None reported, but possibly auditory from "Anders") and sleep disturbance. Negative for confusion, self-injury and suicidal ideas. The patient is not nervous/anxious.      Objective:     Vital Signs (Most Recent):  Temp: 98.6 °F (37 °C) (10/30/18 0738)  Pulse: (!) 58 (10/30/18 0738)  Resp: 16 (10/30/18 0738)  BP: (!) 86/51(MD notified; instructed to give more fluids) (10/30/18 0738)  SpO2: (!) 94 % (10/30/18 0738) Vital Signs (24h " Range):  Temp:  [98.5 °F (36.9 °C)-98.6 °F (37 °C)] 98.6 °F (37 °C)  Pulse:  [52-76] 58  Resp:  [16-20] 16  SpO2:  [94 %-100 %] 94 %  BP: ()/(47-76) 86/51     Weight: 72.6 kg (160 lb)  Body mass index is 22.96 kg/m².    Physical Exam   Constitutional: He is oriented to person, place, and time. He appears well-developed and well-nourished. No distress.   HENT:   Head: Normocephalic and atraumatic.   Mouth/Throat: Oropharynx is clear and moist.   Eyes: EOM are normal. Pupils are equal, round, and reactive to light.   Neurological: He is alert and oriented to person, place, and time. He has normal strength. Gait normal.   Reflex Scores:       Brachioradialis reflexes are 2+ on the right side and 2+ on the left side.       Patellar reflexes are 2+ on the right side and 2+ on the left side.       Achilles reflexes are 1+ on the right side and 1+ on the left side.      NEUROLOGICAL EXAMINATION:     MENTAL STATUS   Oriented to person, place, and time.     CRANIAL NERVES     CN III, IV, VI   Pupils are equal, round, and reactive to light.  Extraocular motions are normal.   CN III: no CN III palsy  CN VI: no CN VI palsy    CN V   Facial sensation intact.     CN VII   Facial expression full, symmetric.     CN VIII   CN VIII normal.     CN XI   CN XI normal.     CN XII   CN XII normal.     MOTOR EXAM   Muscle bulk: normal  Overall muscle tone: normal    Strength   Strength 5/5 throughout.     REFLEXES     Reflexes   Right brachioradialis: 2+  Left brachioradialis: 2+  Right patellar: 2+  Left patellar: 2+  Right achilles: 1+  Left achilles: 1+  Right plantar: normal  Left plantar: normal    SENSORY EXAM   Right arm light touch: normal  Left arm light touch: normal  Right leg light touch: decreased from ankle  Left leg light touch: decreased from ankle  Right arm vibration: normal  Left arm vibration: normal  Right leg vibration: decreased from ankle  Left leg vibration: decreased from ankle    GAIT AND COORDINATION      Gait  Gait: normal      Significant Labs:   BMP:   Recent Labs   Lab 10/29/18  2056 10/30/18  0346   GLU 82 86    139   K 4.3 4.2    108   CO2 20* 22*   BUN 26* 19   CREATININE 0.8 0.6   CALCIUM 9.4 8.9   MG  --  2.3     CBC:   Recent Labs   Lab 10/29/18  2056 10/30/18  0346   WBC 6.67 5.44   HGB 12.3* 12.7*   HCT 38.3* 38.6*    198       Significant Imaging: N/A

## 2018-10-30 NOTE — H&P
"[facility] Hospital Medicine  History & Physical    Patient Name: Terrell Yusuf  MRN: [id number]  Admission Date: 10/29/2018  Attending Physician: Shon Armstrong MD   Primary Care Provider: Scar Whitfield MD    [facility] Medicine Team: Networked reference to record PCT  Ryne Osborn PA-C     Patient information was obtained from patient, past medical records and ER records.     Subjective:     Principal Problem:Recurrent seizures    Chief Complaint:   Chief Complaint   Patient presents with    Seizures     Pt had witnessed seizure by friend. Pt denies head injury. Upon EMS arrival, pt was awake but confused. Pt has history of sseizures, compliant with medications. Pt also had one other seizure today. Pt AAX4 and answering questions appropriatley. Pt had episode of urine incontenince.        HPI: 58 y.o. M p/w recurrent seizures, witnessed by friend. He reports that he takes keppra 1,500 mg bid at home, since he was discharged earlier this month. Compliant with meds (has bottle with him).  Patient does not recall any of the events today, but reports that he ate one meal and took his morning dose of keppra.  When asked to inform me of the last thing he remembered happening today before he arrived, he became upset and agitated and raised his voice saying "I have memory issues, okay!".      Per friend Humaira at bedside, patient was standing when he stared off, then started with BUE tremors/shaking. No tongue biting.  She reports that she lowered him to the ground, where he continued to have seziure acitivty for 2 more minutes, with urinary incontinence.  He was postictal for several minutes until EMS showed up.His friend humaira states that he had two seizures earlier today but was unable to characterize because she did not witness them.  Humaira witnessed his seizures from his most previous admission, and says that those were "grand mal," whereas these are definitely different.  Pt says that he has " "been eating and staying hydrated. Denies recent illnesses. No EtOH or cocaine (he quit 1 year ago).  A few days ago, he had issues with sleep, and stayed up for 48 hours straight, for unknown reason; he just couldn't sleep. Pt intermittently refused to answer my questions, but denies HI/SI, depression/anxiety AVH.  He does state that Anders has a plan for him and saved him from cocaine abuse.  Patient has bottle of keppra at bedside from August admission with both yellow and white pills.  He has been taking three of the yellow pills for his seizures (500 mg tablets), but not the white ones.    Patient presents today with witnessed recurrent seizures over the weekend. Previously admitted in 08/2018 and 10/21 for the same, was non-compliant with keppra at that time.  On 8/2018 admission, was found to have left sided epileptiform discharges and his keppra dose was increased to 1000 mg BID. MRI brain non-con from 10/22/18 showed no acute stroke, and was otherwise unremarkable.    Of note, psych consulted on last admission for delusions/hallucinations ("Anders told me to stop taking my medications") but patient deemed appropriate, and discharged home.     In ED, UDS negative, .  EDMD spoke with neurology who wanted patient admitted for 24 hour EEG monitoring.       Past Medical History:   Diagnosis Date    Seizures        History reviewed. No pertinent surgical history.    Review of patient's allergies indicates:  No Known Allergies    No current facility-administered medications on file prior to encounter.      Current Outpatient Medications on File Prior to Encounter   Medication Sig    levETIRAcetam (KEPPRA) 1000 MG tablet Take 1 tablet (1,000 mg total) by mouth 2 (two) times daily.    levETIRAcetam (KEPPRA) 1000 MG tablet Take 1 tablet (1,000 mg total) by mouth 2 (two) times daily.     Family History     None        Tobacco Use    Smoking status: Former Smoker    Smokeless tobacco: Never Used   Substance " "and Sexual Activity    Alcohol use: Yes    Drug use: No    Sexual activity: Not on file     Review of Systems   Constitutional: Negative for chills and fever.   HENT: Negative for ear pain and sore throat.    Eyes: Negative for pain and visual disturbance.   Respiratory: Negative for cough and shortness of breath.    Cardiovascular: Negative for chest pain and palpitations.   Gastrointestinal: Negative for abdominal pain, diarrhea, nausea and vomiting.   Endocrine: Negative for heat intolerance and polydipsia.   Genitourinary: Negative for dysuria, frequency and urgency.   Musculoskeletal: Negative for back pain and gait problem.   Skin: Negative for rash.   Neurological: Positive for seizures. Negative for dizziness, weakness and headaches.   Psychiatric/Behavioral: Positive for hallucinations (None reported, but possibly auditory from "Anders") and sleep disturbance. Negative for confusion, self-injury and suicidal ideas. The patient is not nervous/anxious.      Objective:     Vital Signs (Most Recent):  Temp: 98.5 °F (36.9 °C) (10/29/18 1930)  Pulse: 62 (10/29/18 2249)  Resp: 20 (10/29/18 2249)  BP: 102/70 (10/29/18 2249)  SpO2: 97 % (10/29/18 2249) Vital Signs (24h Range):  Temp:  [98.5 °F (36.9 °C)] 98.5 °F (36.9 °C)  Pulse:  [62-76] 62  Resp:  [20] 20  SpO2:  [97 %-100 %] 97 %  BP: (102-118)/(70-76) 102/70     Weight: 72.6 kg (160 lb)  Body mass index is 22.96 kg/m².    Physical Exam   Constitutional: He is oriented to person, place, and time. He appears well-developed and well-nourished.   Agitated male in NAD with L hand covering face   HENT:   Head: Normocephalic and atraumatic.   Eyes: Conjunctivae and EOM are normal. Pupils are equal, round, and reactive to light.   Neck: Normal range of motion. Neck supple.   Cardiovascular: Normal rate, regular rhythm, normal heart sounds and intact distal pulses.   No murmur heard.  Pulmonary/Chest: Effort normal and breath sounds normal. He has no rales. He exhibits " no tenderness.   Abdominal: Soft. Bowel sounds are normal. He exhibits no distension. There is no tenderness.   Musculoskeletal: Normal range of motion.   STR equal and bilateral to extremities   Neurological: He is alert and oriented to person, place, and time.   No pronator drift  No facial droop  No gross neural deficits   Skin: Skin is warm and dry.   Psychiatric: His speech is normal. His affect is labile. He is agitated. Thought content is delusional. Thought content is not paranoid. Cognition and memory are impaired. He does not express impulsivity. He expresses no homicidal and no suicidal ideation. He expresses no suicidal plans and no homicidal plans.   Bizarre behavior   Nursing note and vitals reviewed.        CRANIAL NERVES     CN III, IV, VI   Pupils are equal, round, and reactive to light.  Extraocular motions are normal.        Significant Labs:   CBC:   Recent Labs   Lab 10/29/18  2056   WBC 6.67   HGB 12.3*   HCT 38.3*        CMP:   Recent Labs   Lab 10/29/18  2056      K 4.3      CO2 20*   GLU 82   BUN 26*   CREATININE 0.8   CALCIUM 9.4   PROT 7.0   ALBUMIN 3.8   BILITOT 0.6   ALKPHOS 98   AST 16   ALT 15   ANIONGAP 10   EGFRNONAA >60.0       Significant Imaging: I have reviewed all pertinent imaging results/findings within the past 24 hours.    Assessment/Plan:     * Recurrent seizures    - Suspect noncompliance; seizure activity appears similar to previous  - Previous admissions for seizures, previously reports noncompliance  - Previous CTs and MRIs with no acute abnormalities  - Neuro consulted and appreciate recs  - continuous EEG  - keppra level pending  - UDS negative.       VTE Risk Mitigation (From admission, onward)        Ordered     Place sequential compression device  Until discontinued      10/30/18 0046     IP VTE LOW RISK PATIENT  Once      10/30/18 0046             Ryne Osborn PA-C  Department of Hospital Medicine   [facility]

## 2018-10-30 NOTE — ASSESSMENT & PLAN NOTE
59 y/o male with a hx of seizures presenting with breakthrough seizures while on Keppra (1500 mg BID). DDX includes: Refractory seizures, subtherapeutic Keppra level, med noncompliance, insomnia, metabolic derangements. Patient has been reported to be non-compliant with medications during his prior admission, and given his recent stint of insomnia/possible manic episode, there seems to be a reasonable etiology for this most recent seizure activity. Patient's lab-work is unremarkable to suggest a metabolic cause. UDS in ED was negative.     Recommendations:  - Would hold off on performing continuous EEG during this admission, given patient's EEG in 08/2018 that showed some epileptic waveforms. This patient's recent seizure activity is likely a combination of insomnia and medication non-compliance w/ keppra. Unlikely to show new changes now.   - Spoke to patient about seizure precautions, including absolutely no driving in the [facility] for 6 months. Patient expressed understanding  - Continue Keppra 1500 mg BID, with a stress on compliance

## 2018-10-30 NOTE — CONSULTS
"[facility]  Neurology  Consult Note    Patient Name: Terrell Yusuf  MRN: [id number]  Admission Date: 10/29/2018  Hospital Length of Stay: 0 days  Code Status: Full Code   Attending Provider: Jd Verma MD   Consulting Provider: Casey Carroll MD  Primary Care Physician: Scar Whitfield MD  Principal Problem:Recurrent seizures    Inpatient consult to neurology  Consult performed by: Casey Carroll MD  Consult ordered by: Kamlesh Zimmer MD         Subjective:     Chief Complaint:  Recurrent seizures     HPI:   58 y.o. male with a hx of seizure disorder presented to [facility] ED with recurrent seizures, as witnessed by a friend over the weekend. The patient is a poor historian and cannot provide specific details of his most recent seizure episodes or much of his past medical hx for that matter as he claims to have memory issues. Patient remembers talking to his friend (Humaira), lost consciousness, and then was sitting on the ground after regaining consciousness. Patient reports that he takes keppra 1,500 mg BID at home, however, the patient is currently homeless (living in a tent on the street) and prior notes from August admission report inability to afford his medications. He does have his bottle of Keppra with him in ED now though, so true compliance is unclear.     Per friend Humaira at bedside,she noticed Mr. Yusuf begin to pick at his hip with his right arm, was a little rigid on that side "as if he was fighting something," and had issues walking/standing. He eventually fell back , safely, and ended up sitting down, no head trauma or bodily injuries. Seizure activity lasted about 5 minutes, with about 10 min post-ictal state (until EMS arrived) and urinary incontinence  Denies tongue biting. She reports that the patient had 2 other unwitnessed seizures prior to presenting to ED. Humaira witnessed his seizures from his admission earlier this month and says that those were "grand mal," " "whereas these were a different type. Pt says that he has been eating and staying hydrated. Denies recent illnesses. No EtOH or cocaine (he quit 1 year ago). A few days ago, he had issues with sleep, and stayed up for 48 hours straight, for unknown reason; he reports that he just couldn't sleep. Humaira reports that he was in high spirits, very joyful and hyper-verbal during this time, and stating that he "doesn't need to sleep." Supposedly had a prior episode in the past in which he quit his medications. In regards to his current social situation, kanchan stevenson does state that Anders has a plan for him and saved him from cocaine abuse. Of note, psych was consulted on last admission for delusions/hallucinations ("Anders told me to stop taking my medications and would cure my seziures") but patient was deemed appropriate and discharged home.      Patient was previously admitted on 08/2018 for a similar presentation, was noted to be non-compliant with keppra at that time.  On 8/2018 admission, was found to have left sided epileptiform discharges and his keppra dose was increased to 1000 mg BID. MRI brain non-con from 08/03/18 showed no acute stroke, and was otherwise unremarkable.     In ED, UDS negative, . ED MD spoke with neurology who wanted patient admitted for 24 hour EEG monitoring.             No new subjective & objective note has been filed under this hospital service since the last note was generated.    Assessment and Plan:     * Recurrent seizures    59 y/o male with a hx of seizures presenting with breakthrough seizures while on Keppra (1500 mg BID). DDX includes: Refractory seizures, subtherapeutic Keppra level, med noncompliance, insomnia, metabolic derangements. Patient has been reported to be non-compliant with medications during his prior admission, and given his recent stint of insomnia/possible manic episode, there seems to be a reasonable etiology for this most recent seizure activity. Patient's lab-work " is unremarkable to suggest a metabolic cause. UDS in ED was negative.     Recommendations:  - Would hold off on performing continuous EEG during this admission, given patient's EEG in 08/2018 that showed some epileptic waveforms. This patient's recent seizure activity is likely a combination of insomnia and medication non-compliance w/ keppra. Unlikely to show new changes now.   - Spoke to patient about seizure precautions, including absolutely no driving in the [facility] for 6 months. Patient expressed understanding  - Continue Keppra 1500 mg BID, with a stress on compliance  - Follow up in Epilepsy clinic               VTE Risk Mitigation (From admission, onward)        Ordered     Place sequential compression device  Until discontinued      10/30/18 0046     IP VTE LOW RISK PATIENT  Once      10/30/18 0046          Thank you for your consult. I will sign off. Please contact us if you have any additional questions.    Casey Carroll MD  Neurology  [facility]

## 2018-11-01 LAB — LEVETIRACETAM SERPL-MCNC: 19.6 UG/ML (ref 3–60)

## 2018-11-02 NOTE — DISCHARGE SUMMARY
Discharge Summary  Hospital Medicine    Attending Provider on Discharge: Jd Verma MD  Hospital Medicine Team: Networked reference to record PCT   Date of Admission:  10/29/2018     Date of Discharge:  10/30/2018  Code status: Full Code    Active Hospital Problems    Diagnosis  POA    *Recurrent seizures [G40.909]  Yes      Resolved Hospital Problems   No resolved problems to display.     CALE Yusuf is a 58 y.o. man with a  History of recurrent seizures who presented to the ED after his friend witnesses an episode of the patient staring off into space with bilateral low amplitude tremors. For full details of the patient's admission, please see the admission H&P.    The hospital course by problem was as follows    Hospital Course  Patient admitted briefly to Hospital Medicine with Neurology on board as consult. Neurology evaluated patient in ED and felt that EEG unnecessary and recommended discharging patient home on previous dose of Keppra 1,500 mg BID (suspect patient as missed several recent doses) and will need follow up with Neurology in clinic. Patient did not receive bed assignment prior to discharge.    Recent Labs   Lab 10/29/18  2056 10/30/18  0346   WBC 6.67 5.44   HGB 12.3* 12.7*   HCT 38.3* 38.6*    198     Recent Labs   Lab 10/29/18  2056 10/30/18  0346    139   K 4.3 4.2    108   CO2 20* 22*   BUN 26* 19   CREATININE 0.8 0.6   GLU 82 86   CALCIUM 9.4 8.9   MG  --  2.3   PHOS  --  3.5     Recent Labs   Lab 10/29/18  2056   ALKPHOS 98   ALT 15   AST 16   ALBUMIN 3.8   PROT 7.0   BILITOT 0.6      No results for input(s): POCTGLUCOSE in the last 168 hours.  No results for input(s): CPK, CPKMB, MB, TROPONINI in the last 72 hours.    Discharge Medication List as of 10/30/2018  5:04 PM      CONTINUE these medications which have CHANGED    Details   levETIRAcetam (KEPPRA) 1000 MG tablet Take 1.5 tablets (1,500 mg total) by mouth 2 (two) times daily., Starting Tue 10/30/2018,  Until Thu 11/29/2018, Normal             Discharge Diet:regular diet with Normal Fluid intake of 1500 - 2000 mL per day    Activity: activity as tolerated no drving    Discharge Condition: Good    Disposition: Home or Self Care     Time spent  on the discharge of the patient including review of hospital course with the patient. reviewing discharge medications and arranging follow-up care: >35 mins    Discharge examination Patient was seen and examined on the date of discharge and determined to be suitable for discharge.    Discharge plan and follow up:    No future appointments.      Jd Verma MD  Hospital Medicine Staff  [facility]   Pager: [phone]

## 2018-11-24 ENCOUNTER — HOSPITAL ENCOUNTER (INPATIENT)
Facility: HOSPITAL | Age: 58
LOS: 4 days | Discharge: HOME | DRG: 101 | End: 2018-11-28
Attending: EMERGENCY MEDICINE | Admitting: HOSPITALIST
Payer: MEDICAID

## 2018-11-24 DIAGNOSIS — S92.515A NONDISPLACED FRACTURE OF PROXIMAL PHALANX OF LEFT LESSER TOE(S), INITIAL ENCOUNTER FOR CLOSED FRACTURE: ICD-10-CM

## 2018-11-24 DIAGNOSIS — G40.909 SEIZURE DISORDER: Primary | ICD-10-CM

## 2018-11-24 DIAGNOSIS — G40.909 RECURRENT SEIZURES: ICD-10-CM

## 2018-11-24 DIAGNOSIS — S92.515A CLOSED NONDISPLACED FRACTURE OF PROXIMAL PHALANX OF LESSER TOE OF LEFT FOOT, INITIAL ENCOUNTER: ICD-10-CM

## 2018-11-24 DIAGNOSIS — T14.90XA TRAUMA: ICD-10-CM

## 2018-11-24 PROBLEM — G62.9 NEUROPATHY: Status: ACTIVE | Noted: 2018-11-24

## 2018-11-24 LAB
ALBUMIN SERPL BCP-MCNC: 3.6 G/DL
ALP SERPL-CCNC: 123 U/L
ALT SERPL W/O P-5'-P-CCNC: 43 U/L
AMPHET+METHAMPHET UR QL: NEGATIVE
ANION GAP SERPL CALC-SCNC: 12 MMOL/L
AST SERPL-CCNC: 33 U/L
BARBITURATES UR QL SCN>200 NG/ML: NEGATIVE
BASOPHILS # BLD AUTO: 0.05 K/UL
BASOPHILS NFR BLD: 0.6 %
BENZODIAZ UR QL SCN>200 NG/ML: NEGATIVE
BILIRUB SERPL-MCNC: 0.5 MG/DL
BILIRUB UR QL STRIP: NEGATIVE
BUN SERPL-MCNC: 14 MG/DL
BZE UR QL SCN: NEGATIVE
CALCIUM SERPL-MCNC: 9.2 MG/DL
CANNABINOIDS UR QL SCN: NEGATIVE
CHLORIDE SERPL-SCNC: 102 MMOL/L
CLARITY UR REFRACT.AUTO: CLEAR
CO2 SERPL-SCNC: 24 MMOL/L
COLOR UR AUTO: YELLOW
CREAT SERPL-MCNC: 0.8 MG/DL
CREAT UR-MCNC: 61 MG/DL
DIFFERENTIAL METHOD: ABNORMAL
EOSINOPHIL # BLD AUTO: 0.1 K/UL
EOSINOPHIL NFR BLD: 1.2 %
ERYTHROCYTE [DISTWIDTH] IN BLOOD BY AUTOMATED COUNT: 12.2 %
EST. GFR  (AFRICAN AMERICAN): >60 ML/MIN/1.73 M^2
EST. GFR  (NON AFRICAN AMERICAN): >60 ML/MIN/1.73 M^2
ETHANOL UR-MCNC: <10 MG/DL
GLUCOSE SERPL-MCNC: 98 MG/DL
GLUCOSE UR QL STRIP: NEGATIVE
HCT VFR BLD AUTO: 41.1 %
HGB BLD-MCNC: 14 G/DL
HGB UR QL STRIP: ABNORMAL
IMM GRANULOCYTES # BLD AUTO: 0.02 K/UL
IMM GRANULOCYTES NFR BLD AUTO: 0.2 %
KETONES UR QL STRIP: NEGATIVE
LEUKOCYTE ESTERASE UR QL STRIP: NEGATIVE
LYMPHOCYTES # BLD AUTO: 1.4 K/UL
LYMPHOCYTES NFR BLD: 16.9 %
MAGNESIUM SERPL-MCNC: 2.4 MG/DL
MCH RBC QN AUTO: 30.1 PG
MCHC RBC AUTO-ENTMCNC: 34.1 G/DL
MCV RBC AUTO: 88 FL
METHADONE UR QL SCN>300 NG/ML: NEGATIVE
MICROSCOPIC COMMENT: NORMAL
MONOCYTES # BLD AUTO: 1.2 K/UL
MONOCYTES NFR BLD: 14.3 %
NEUTROPHILS # BLD AUTO: 5.4 K/UL
NEUTROPHILS NFR BLD: 66.8 %
NITRITE UR QL STRIP: NEGATIVE
NRBC BLD-RTO: 0 /100 WBC
OPIATES UR QL SCN: NEGATIVE
PCP UR QL SCN>25 NG/ML: NEGATIVE
PH UR STRIP: 5 [PH] (ref 5–8)
PHOSPHATE SERPL-MCNC: 3.7 MG/DL
PLATELET # BLD AUTO: 240 K/UL
PMV BLD AUTO: 9.8 FL
POTASSIUM SERPL-SCNC: 4 MMOL/L
PREALB SERPL-MCNC: 24 MG/DL
PROT SERPL-MCNC: 7.4 G/DL
PROT UR QL STRIP: NEGATIVE
RBC # BLD AUTO: 4.65 M/UL
RBC #/AREA URNS AUTO: 2 /HPF (ref 0–4)
SODIUM SERPL-SCNC: 138 MMOL/L
SP GR UR STRIP: 1.01 (ref 1–1.03)
TOXICOLOGY INFORMATION: NORMAL
URN SPEC COLLECT METH UR: ABNORMAL
WBC # BLD AUTO: 8.06 K/UL
WBC #/AREA URNS AUTO: 0 /HPF (ref 0–5)

## 2018-11-24 PROCEDURE — 90715 TDAP VACCINE 7 YRS/> IM: CPT | Performed by: NURSE PRACTITIONER

## 2018-11-24 PROCEDURE — 80177 DRUG SCRN QUAN LEVETIRACETAM: CPT

## 2018-11-24 PROCEDURE — 99223 1ST HOSP IP/OBS HIGH 75: CPT | Mod: ,,, | Performed by: HOSPITALIST

## 2018-11-24 PROCEDURE — 96365 THER/PROPH/DIAG IV INF INIT: CPT

## 2018-11-24 PROCEDURE — 3E0234Z INTRODUCTION OF SERUM, TOXOID AND VACCINE INTO MUSCLE, PERCUTANEOUS APPROACH: ICD-10-PCS | Performed by: HOSPITALIST

## 2018-11-24 PROCEDURE — 84100 ASSAY OF PHOSPHORUS: CPT

## 2018-11-24 PROCEDURE — 81001 URINALYSIS AUTO W/SCOPE: CPT

## 2018-11-24 PROCEDURE — 90471 IMMUNIZATION ADMIN: CPT | Performed by: NURSE PRACTITIONER

## 2018-11-24 PROCEDURE — 80307 DRUG TEST PRSMV CHEM ANLYZR: CPT

## 2018-11-24 PROCEDURE — 80053 COMPREHEN METABOLIC PANEL: CPT

## 2018-11-24 PROCEDURE — 84134 ASSAY OF PREALBUMIN: CPT

## 2018-11-24 PROCEDURE — 20600001 HC STEP DOWN PRIVATE ROOM

## 2018-11-24 PROCEDURE — 99285 EMERGENCY DEPT VISIT HI MDM: CPT | Mod: ,,, | Performed by: EMERGENCY MEDICINE

## 2018-11-24 PROCEDURE — 83735 ASSAY OF MAGNESIUM: CPT

## 2018-11-24 PROCEDURE — 25000003 PHARM REV CODE 250: Performed by: NURSE PRACTITIONER

## 2018-11-24 PROCEDURE — 25000003 PHARM REV CODE 250: Performed by: HOSPITALIST

## 2018-11-24 PROCEDURE — 63600175 PHARM REV CODE 636 W HCPCS: Performed by: NURSE PRACTITIONER

## 2018-11-24 PROCEDURE — 99285 EMERGENCY DEPT VISIT HI MDM: CPT | Mod: 25

## 2018-11-24 PROCEDURE — 85025 COMPLETE CBC W/AUTO DIFF WBC: CPT

## 2018-11-24 RX ORDER — GABAPENTIN 300 MG/1
300 CAPSULE ORAL NIGHTLY
Status: DISCONTINUED | OUTPATIENT
Start: 2018-11-24 | End: 2018-11-25

## 2018-11-24 RX ORDER — LEVETIRACETAM 750 MG/1
1500 TABLET ORAL 2 TIMES DAILY
Status: DISCONTINUED | OUTPATIENT
Start: 2018-11-24 | End: 2018-11-25

## 2018-11-24 RX ORDER — LEVETIRACETAM 15 MG/ML
1500 INJECTION INTRAVASCULAR
Status: COMPLETED | OUTPATIENT
Start: 2018-11-24 | End: 2018-11-24

## 2018-11-24 RX ORDER — ACETAMINOPHEN 325 MG/1
650 TABLET ORAL EVERY 8 HOURS PRN
Status: DISCONTINUED | OUTPATIENT
Start: 2018-11-24 | End: 2018-11-28 | Stop reason: HOSPADM

## 2018-11-24 RX ADMIN — CLOSTRIDIUM TETANI TOXOID ANTIGEN (FORMALDEHYDE INACTIVATED), CORYNEBACTERIUM DIPHTHERIAE TOXOID ANTIGEN (FORMALDEHYDE INACTIVATED), BORDETELLA PERTUSSIS TOXOID ANTIGEN (GLUTARALDEHYDE INACTIVATED), BORDETELLA PERTUSSIS FILAMENTOUS HEMAGGLUTININ ANTIGEN (FORMALDEHYDE INACTIVATED), BORDETELLA PERTUSSIS PERTACTIN ANTIGEN, AND BORDETELLA PERTUSSIS FIMBRIAE 2/3 ANTIGEN 0.5 ML: 5; 2; 2.5; 5; 3; 5 INJECTION, SUSPENSION INTRAMUSCULAR at 04:11

## 2018-11-24 RX ADMIN — LEVETIRACETAM INJECTION 1500 MG: 15 INJECTION INTRAVENOUS at 04:11

## 2018-11-24 RX ADMIN — GABAPENTIN 300 MG: 300 CAPSULE ORAL at 08:11

## 2018-11-24 RX ADMIN — SODIUM CHLORIDE 1000 ML: 0.9 INJECTION, SOLUTION INTRAVENOUS at 03:11

## 2018-11-24 RX ADMIN — ACETAMINOPHEN 650 MG: 325 TABLET ORAL at 08:11

## 2018-11-24 RX ADMIN — LEVETIRACETAM 1500 MG: 750 TABLET ORAL at 08:11
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
## 2018-11-24 NOTE — ASSESSMENT & PLAN NOTE
"Uncertain why patient is experiencing breakthrough seizures. Patient is adamant that he takes his Keppra twice a day. Both he and his partner report uncontrolled seizures for decades and express frustration that "we have to start from the beginning every time we come in with this". CT head is negative for acute intracranial pathology. Chemistries, CBC are WNL. He is hemodynamically stable. Per family, he is at his baseline mentation. He is maxed on Keppra; he may need additional AED agents.  --24h EEG ordered  --Neurology consult placed, appreciate assistance.   --Keppra 1.5g BID restarted  --f/u UA to rule out infection  --f/u tox screen, levetiracetam levels  --seizure precautions    "
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
## 2018-11-25 PROBLEM — S92.515A NONDISPLACED FRACTURE OF PROXIMAL PHALANX OF LEFT LESSER TOE(S), INITIAL ENCOUNTER FOR CLOSED FRACTURE: Status: ACTIVE | Noted: 2018-11-25

## 2018-11-25 LAB
FOLATE SERPL-MCNC: 9.9 NG/ML
VIT B12 SERPL-MCNC: 290 PG/ML

## 2018-11-25 PROCEDURE — 25000003 PHARM REV CODE 250: Performed by: HOSPITALIST

## 2018-11-25 PROCEDURE — 25000003 PHARM REV CODE 250: Performed by: STUDENT IN AN ORGANIZED HEALTH CARE EDUCATION/TRAINING PROGRAM

## 2018-11-25 PROCEDURE — 82746 ASSAY OF FOLIC ACID SERUM: CPT

## 2018-11-25 PROCEDURE — 82525 ASSAY OF COPPER: CPT

## 2018-11-25 PROCEDURE — 99231 SBSQ HOSP IP/OBS SF/LOW 25: CPT | Mod: ,,, | Performed by: HOSPITALIST

## 2018-11-25 PROCEDURE — 82607 VITAMIN B-12: CPT

## 2018-11-25 PROCEDURE — 99233 SBSQ HOSP IP/OBS HIGH 50: CPT | Mod: ,,, | Performed by: PSYCHIATRY & NEUROLOGY

## 2018-11-25 PROCEDURE — 20600001 HC STEP DOWN PRIVATE ROOM

## 2018-11-25 PROCEDURE — 84425 ASSAY OF VITAMIN B-1: CPT

## 2018-11-25 PROCEDURE — 83921 ORGANIC ACID SINGLE QUANT: CPT

## 2018-11-25 PROCEDURE — 36415 COLL VENOUS BLD VENIPUNCTURE: CPT

## 2018-11-25 RX ORDER — THIAMINE HCL 100 MG
100 TABLET ORAL DAILY
Status: DISCONTINUED | OUTPATIENT
Start: 2018-11-26 | End: 2018-11-28 | Stop reason: HOSPADM

## 2018-11-25 RX ORDER — TRAMADOL HYDROCHLORIDE 50 MG/1
100 TABLET ORAL ONCE
Status: DISCONTINUED | OUTPATIENT
Start: 2018-11-26 | End: 2018-11-26

## 2018-11-25 RX ORDER — DIPHENHYDRAMINE HYDROCHLORIDE 50 MG/ML
50 INJECTION INTRAMUSCULAR; INTRAVENOUS ONCE
Status: DISCONTINUED | OUTPATIENT
Start: 2018-11-26 | End: 2018-11-26

## 2018-11-25 RX ORDER — ACETAMINOPHEN 325 MG/1
650 TABLET ORAL EVERY 6 HOURS PRN
Status: DISCONTINUED | OUTPATIENT
Start: 2018-11-25 | End: 2018-11-28 | Stop reason: HOSPADM

## 2018-11-25 RX ORDER — LEVETIRACETAM 750 MG/1
750 TABLET ORAL 2 TIMES DAILY
Status: DISCONTINUED | OUTPATIENT
Start: 2018-11-25 | End: 2018-11-27

## 2018-11-25 RX ORDER — LORAZEPAM 2 MG/ML
2 INJECTION INTRAMUSCULAR EVERY 5 MIN PRN
Status: DISCONTINUED | OUTPATIENT
Start: 2018-11-25 | End: 2018-11-28 | Stop reason: HOSPADM

## 2018-11-25 RX ORDER — FOLIC ACID 1 MG/1
1 TABLET ORAL DAILY
Status: DISCONTINUED | OUTPATIENT
Start: 2018-11-26 | End: 2018-11-28 | Stop reason: HOSPADM

## 2018-11-25 RX ORDER — CYANOCOBALAMIN (VITAMIN B-12) 250 MCG
250 TABLET ORAL DAILY
Status: DISCONTINUED | OUTPATIENT
Start: 2018-11-26 | End: 2018-11-28 | Stop reason: HOSPADM

## 2018-11-25 RX ADMIN — LEVETIRACETAM 750 MG: 750 TABLET ORAL at 08:11

## 2018-11-25 RX ADMIN — IBUPROFEN 600 MG: 200 TABLET, FILM COATED ORAL at 08:11

## 2018-11-25 RX ADMIN — LEVETIRACETAM 1500 MG: 750 TABLET ORAL at 09:11

## 2018-11-25 NOTE — ASSESSMENT & PLAN NOTE
Worsens following seizures. Non-diabetic, B12 checked in August was wnl.   Trial of gabapentin qhs.

## 2018-11-25 NOTE — PLAN OF CARE
Problem: Patient Care Overview  Goal: Plan of Care Review  POC reviewed with pt and spouse Verbalized understanding. Spouse is at bedside. VSS. Neuro exam remains unchanged. No events noted this shift. Fall precautions maintained. Pt advised to call staff for assistance. Will continue to monitor with all safety measures met.
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
## 2018-11-25 NOTE — HPI
Terrell Valderrama Is a 58 y.o. Male with medical history of seizure disorder on keppra presenting with concerns of breakthrough seizures and general neurology has been consulted for the dame.     History from patient / friend / medical records :  - Onset: Age of 14  - Semiology: Unclear number, types / Prior as per chart   (i) Closed eyes, B/L LE tonic extension, Negative UE involvement, UI, TB x few minutes + post event confusion   (ii) Closed eyes, B/L UE, LE jerking movements, Negative UE involvement, UI, TB x few minutes + post event confusion   - Frequency: once every 2 weeks / however over the last yr reported increased frequency once every 7 days. Improvement post keppra initiation to every 10 day, until the current admission - 5 events, type (i), lasting to max 3 mins   - AEDs: keppra 1.5 g BID   - Triggers: Anxiety, stress, lack of sleep   - Negative for migraines / meningitis in the past     - MRI 10/2018: Few punctate scattered T2 FLAIR hyperintensities in the supratentorial white matter which are often seen in the setting of microvascular ischemic change.  The hippocampal formations are unremarkable.  - EEG 10/2018: independent left and right temporal focal sharp waves confers an increased risk of focal seizures
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
## 2018-11-26 PROBLEM — F10.11 HISTORY OF ALCOHOL ABUSE: Chronic | Status: ACTIVE | Noted: 2018-11-26

## 2018-11-26 PROBLEM — F14.11 HISTORY OF COCAINE ABUSE: Status: ACTIVE | Noted: 2018-11-26

## 2018-11-26 PROCEDURE — 95951 PR EEG MONITORING/VIDEORECORD: CPT | Mod: 26,,, | Performed by: PSYCHIATRY & NEUROLOGY

## 2018-11-26 PROCEDURE — 25000003 PHARM REV CODE 250: Performed by: STUDENT IN AN ORGANIZED HEALTH CARE EDUCATION/TRAINING PROGRAM

## 2018-11-26 PROCEDURE — 99233 SBSQ HOSP IP/OBS HIGH 50: CPT | Mod: ,,, | Performed by: PSYCHIATRY & NEUROLOGY

## 2018-11-26 PROCEDURE — 20600001 HC STEP DOWN PRIVATE ROOM

## 2018-11-26 PROCEDURE — 25000003 PHARM REV CODE 250: Performed by: HOSPITALIST

## 2018-11-26 PROCEDURE — 99231 SBSQ HOSP IP/OBS SF/LOW 25: CPT | Mod: ,,, | Performed by: HOSPITALIST

## 2018-11-26 PROCEDURE — 95951 HC EEG MONITORING/VIDEO RECORD: CPT

## 2018-11-26 RX ORDER — TRAMADOL HYDROCHLORIDE 50 MG/1
50 TABLET ORAL ONCE
Status: COMPLETED | OUTPATIENT
Start: 2018-11-27 | End: 2018-11-27

## 2018-11-26 RX ORDER — DIPHENHYDRAMINE HYDROCHLORIDE 50 MG/ML
50 INJECTION INTRAMUSCULAR; INTRAVENOUS ONCE
Status: COMPLETED | OUTPATIENT
Start: 2018-11-27 | End: 2018-11-27

## 2018-11-26 RX ADMIN — LEVETIRACETAM 750 MG: 750 TABLET ORAL at 10:11

## 2018-11-26 RX ADMIN — FOLIC ACID 1 MG: 1 TABLET ORAL at 10:11

## 2018-11-26 RX ADMIN — IBUPROFEN 600 MG: 200 TABLET, FILM COATED ORAL at 06:11

## 2018-11-26 RX ADMIN — THERA TABS 1 TABLET: TAB at 10:11

## 2018-11-26 RX ADMIN — THIAMINE HCL TAB 100 MG 100 MG: 100 TAB at 10:11

## 2018-11-26 RX ADMIN — CYANOCOBALAMIN TAB 250 MCG 250 MCG: 250 TAB at 10:11

## 2018-11-26 RX ADMIN — LEVETIRACETAM 750 MG: 750 TABLET ORAL at 09:11

## 2018-11-26 RX ADMIN — IBUPROFEN 600 MG: 200 TABLET, FILM COATED ORAL at 09:11
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
## 2018-11-26 NOTE — ASSESSMENT & PLAN NOTE
Worsens following seizures. Non-diabetic, B12 checked in August was wnl.   -stoped gabapentin per neuro recs  -checking b12, thiamine, folate, copper levels
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
## 2018-11-26 NOTE — ASSESSMENT & PLAN NOTE
Worsens following seizures. Non-diabetic, B12 checked in August was wnl.   -stoped gabapentin per neuro recs  -checking b12, folate, - levels wnl,   Pending - copper levels, thiamine

## 2018-11-26 NOTE — ASSESSMENT & PLAN NOTE
"Reports 30+ year hx of heavy alcohol use, near daily use  -"I would drink until I was loaded" does not quantify.  Reports he was able to quit with the help of [employer] "I asked Anders to help me"     "
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
## 2018-11-26 NOTE — SUBJECTIVE & OBJECTIVE
Subjective:     Interval History:     NAEON. No concerns for clinical seizures. EEG not placed yet, plans for cEEG today, with provocation procedures.     Current Neurological Medications:     Current Facility-Administered Medications   Medication Dose Route Frequency Provider Last Rate Last Dose    acetaminophen tablet 650 mg  650 mg Oral Q8H PRN Ailyn Ugarte MD   650 mg at 11/24/18 2036    acetaminophen tablet 650 mg  650 mg Oral Q6H PRN Orville Tovar MD        cyanocobalamin tablet 250 mcg  250 mcg Oral Daily Orville Tovar MD   250 mcg at 11/26/18 1015    diphenhydrAMINE injection 50 mg  50 mg Intravenous Once Orville Tovar MD        folic acid tablet 1 mg  1 mg Oral Daily Orville Tovar MD   1 mg at 11/26/18 1016    ibuprofen tablet 600 mg  600 mg Oral Q8H Orville Tovar MD   600 mg at 11/26/18 0606    levETIRAcetam tablet 750 mg  750 mg Oral BID Selvin Lucio MD   750 mg at 11/26/18 1014    lorazepam injection 2 mg  2 mg Intravenous Q5 Min PRN Orville Tovar MD        multivitamin tablet 1 tablet  1 tablet Oral Daily Orville Tovar MD   1 tablet at 11/26/18 1016    thiamine tablet 100 mg  100 mg Oral Daily Orville Tovar MD   100 mg at 11/26/18 1015    traMADol tablet 100 mg  100 mg Oral Once Orville Tovar MD           Review of Systems   Constitutional: Negative for chills and fever.   HENT: Negative for trouble swallowing.    Respiratory: Negative for chest tightness and shortness of breath.    Cardiovascular: Negative for chest pain.   Gastrointestinal: Negative for abdominal pain.   Genitourinary: Negative for dysuria.   Neurological: Negative for dizziness, tremors, syncope, facial asymmetry, weakness, light-headedness, numbness and headaches.   Psychiatric/Behavioral: Negative for agitation, behavioral problems and confusion.     Objective:     Vital Signs (Most Recent):  Temp: 98.2 °F (36.8 °C) (11/26/18 0725)  Pulse: 66 (11/26/18  0334)  Resp: 18 (11/26/18 0725)  BP: 108/68 (11/26/18 0725)  SpO2: 95 % (11/26/18 0725) Vital Signs (24h Range):  Temp:  [96.3 °F (35.7 °C)-99 °F (37.2 °C)] 98.2 °F (36.8 °C)  Pulse:  [66-77] 66  Resp:  [16-18] 18  SpO2:  [93 %-98 %] 95 %  BP: ()/(56-74) 108/68     Weight: 74.8 kg (165 lb)  Body mass index is 25.09 kg/m².    Physical Exam   Constitutional: He is oriented to person, place, and time. No distress.   HENT:   Head: Normocephalic and atraumatic.   Eyes: EOM are normal. Pupils are equal, round, and reactive to light.   Neck: Neck supple.   Cardiovascular:   No murmur heard.  Pulmonary/Chest: No respiratory distress.   Musculoskeletal: He exhibits no deformity.   Neurological: He is oriented to person, place, and time. He has normal strength.   Reflex Scores:       Tricep reflexes are 2+ on the right side and 2+ on the left side.       Bicep reflexes are 2+ on the right side and 2+ on the left side.       Brachioradialis reflexes are 2+ on the right side and 2+ on the left side.       Patellar reflexes are 2+ on the right side and 2+ on the left side.       Achilles reflexes are 2+ on the right side and 2+ on the left side.  Psychiatric: His speech is normal.       NEUROLOGICAL EXAMINATION:     MENTAL STATUS   Oriented to person, place, and time.   Attention: normal. Concentration: decreased.   Speech: speech is normal   Level of consciousness: alert    CRANIAL NERVES     CN II   Visual fields full to confrontation.     CN III, IV, VI   Pupils are equal, round, and reactive to light.  Extraocular motions are normal.   Nystagmus: none   Diplopia: none  Ophthalmoparesis: none    CN V   Facial sensation intact.     CN VII   Facial expression full, symmetric.     CN VIII   CN VIII normal.     CN IX, X   CN IX normal.   CN X normal.     CN XI   CN XI normal.     CN XII   CN XII normal.     MOTOR EXAM   Muscle bulk: normal  Overall muscle tone: normal    Strength   Strength 5/5 throughout.     REFLEXES      Reflexes   Right brachioradialis: 2+  Left brachioradialis: 2+  Right biceps: 2+  Left biceps: 2+  Right triceps: 2+  Left triceps: 2+  Right patellar: 2+  Left patellar: 2+  Right achilles: 2+  Left achilles: 2+  Right plantar: normal  Left plantar: normal    SENSORY EXAM   Pinprick normal.     GAIT AND COORDINATION     Tremor   Resting tremor: absent  Intention tremor: absent  Action tremor: absent      Significant Labs:   Hemoglobin A1c: No results for input(s): HGBA1C in the last 720 hours.  Blood Culture: No results for input(s): LABBLOO in the last 48 hours.  CBC:   Recent Labs   Lab 11/24/18  1545   WBC 8.06   HGB 14.0   HCT 41.1        CMP:   Recent Labs   Lab 11/24/18  1545   GLU 98      K 4.0      CO2 24   BUN 14   CREATININE 0.8   CALCIUM 9.2   MG 2.4   PROT 7.4   ALBUMIN 3.6   BILITOT 0.5   ALKPHOS 123   AST 33   ALT 43   ANIONGAP 12   EGFRNONAA >60.0     Urine Studies:   Recent Labs   Lab 11/24/18  1731   COLORU Yellow   APPEARANCEUA Clear   PHUR 5.0   SPECGRAV 1.010   PROTEINUA Negative   GLUCUA Negative   KETONESU Negative   BILIRUBINUA Negative   OCCULTUA 2+*   NITRITE Negative   LEUKOCYTESUR Negative   RBCUA 2   WBCUA 0     All pertinent lab results from the past 24 hours have been reviewed.    Significant Imaging: I have reviewed and interpreted all pertinent imaging results/findings within the past 24 hours.
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
## 2018-11-26 NOTE — ASSESSMENT & PLAN NOTE
"Reports 30+ year hx of heavy alcohol use, near daily use  -"I would drink until I was loaded" does not quantify.  Reports he was able to quit with the help of [employer] "I asked Anders to help me"     "
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
## 2018-11-26 NOTE — ASSESSMENT & PLAN NOTE
PRAFO boot ordered,on admit discuss obtaining from DM with CM.   -IN discussion with PT they recommend a darco shoe would promote weight bearing to heels, requested CM or PT to assist in ordering

## 2018-11-26 NOTE — ASSESSMENT & PLAN NOTE
Similar to his alcohol use, reports 30 yr hx of crack cocaine use.  Has recently quit with assistance of increased [employer] practice.

## 2018-11-26 NOTE — PLAN OF CARE
Problem: Patient Care Overview  Goal: Plan of Care Review  Outcome: Ongoing (interventions implemented as appropriate)  Patient remains free from injury or fall. Cardiac rhythm monitored. Seizure, aspiration and fall precautions applied. No seizure like activity noted. Will continue to monitor.
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
## 2018-11-26 NOTE — PLAN OF CARE
11/26/18 1419   Discharge Assessment   Assessment Type Discharge Planning Assessment   Confirmed/corrected address and phone number on facesheet? Yes   Assessment information obtained from? Patient   Expected Length of Stay (days) 2   Communicated expected length of stay with patient/caregiver yes   Prior to hospitilization cognitive status: Alert/Oriented   Prior to hospitalization functional status: Independent   Current cognitive status: Alert/Oriented   Current Functional Status: Independent   Lives With alone;other (see comments)  (homeless)   Able to Return to Prior Arrangements yes   Is patient able to care for self after discharge? Yes   Readmission Within The Last 30 Days no previous admission in last 30 days   Patient currently being followed by outpatient case management? No   Patient currently receives any other outside agency services? No   Do you have any problems affording any of your prescribed medications? No   Discharge Plan A Shelter   Discharge Plan B Other   Patient/Family In Agreement With Plan yes   Met with patient and he states that he is homeless. Discussed shelters and he does not want to go to a shelter because he has been to many before. He states that he will live in his tent. Boot has been delivered to patient.

## 2018-11-26 NOTE — ASSESSMENT & PLAN NOTE
-reduced keppra to 750 bid per neurology  -seizure provocation protocol, -IV Benadryl and Tramodol     -EEG ordered,   Discuss with epliepsy in the morning.

## 2018-11-26 NOTE — PLAN OF CARE
Problem: Patient Care Overview  Goal: Plan of Care Review  POC reviewed with pt. Verbalized understanding. VSS. No c/o pain this shift. Neuro exam remains unchanged. Seizure precautions maintained, bed rails padded  No events this shift.reported to [facility] med B pt is not on EEG monitoring instructed to hold sleep deprivation, tramadol and benadryl  pt was not sleep deprived this shift. Fall precautions maintained. Pt advised to call staff for assistance. Will continue to monitor with all safety measures met.

## 2018-11-27 LAB
COPPER SERPL-MCNC: 1204 UG/L (ref 665–1480)
LEVETIRACETAM SERPL-MCNC: 25.7 UG/ML (ref 3–60)

## 2018-11-27 PROCEDURE — 95951 PR EEG MONITORING/VIDEORECORD: CPT | Mod: 26,,, | Performed by: PSYCHIATRY & NEUROLOGY

## 2018-11-27 PROCEDURE — 99233 SBSQ HOSP IP/OBS HIGH 50: CPT | Mod: ,,, | Performed by: HOSPITALIST

## 2018-11-27 PROCEDURE — 25000003 PHARM REV CODE 250: Performed by: HOSPITALIST

## 2018-11-27 PROCEDURE — 95951 HC EEG MONITORING/VIDEO RECORD: CPT

## 2018-11-27 PROCEDURE — 20600001 HC STEP DOWN PRIVATE ROOM

## 2018-11-27 PROCEDURE — 63600175 PHARM REV CODE 636 W HCPCS: Performed by: HOSPITALIST

## 2018-11-27 PROCEDURE — 25000003 PHARM REV CODE 250: Performed by: STUDENT IN AN ORGANIZED HEALTH CARE EDUCATION/TRAINING PROGRAM

## 2018-11-27 RX ORDER — CLOBAZAM 10 MG/1
20 TABLET ORAL EVERY 12 HOURS
Status: DISCONTINUED | OUTPATIENT
Start: 2018-11-27 | End: 2018-11-28 | Stop reason: HOSPADM

## 2018-11-27 RX ORDER — LAMOTRIGINE 25 MG/1
25 TABLET ORAL DAILY
Status: DISCONTINUED | OUTPATIENT
Start: 2018-11-28 | End: 2018-11-28 | Stop reason: HOSPADM

## 2018-11-27 RX ADMIN — LEVETIRACETAM 750 MG: 750 TABLET ORAL at 09:11

## 2018-11-27 RX ADMIN — TRAMADOL HYDROCHLORIDE 50 MG: 50 TABLET, FILM COATED ORAL at 09:11

## 2018-11-27 RX ADMIN — IBUPROFEN 600 MG: 200 TABLET, FILM COATED ORAL at 03:11

## 2018-11-27 RX ADMIN — FOLIC ACID 1 MG: 1 TABLET ORAL at 09:11

## 2018-11-27 RX ADMIN — THERA TABS 1 TABLET: TAB at 09:11

## 2018-11-27 RX ADMIN — CLOBAZAM 20 MG: 10 TABLET ORAL at 09:11

## 2018-11-27 RX ADMIN — DIPHENHYDRAMINE HYDROCHLORIDE 50 MG: 50 INJECTION, SOLUTION INTRAMUSCULAR; INTRAVENOUS at 09:11

## 2018-11-27 RX ADMIN — IBUPROFEN 600 MG: 200 TABLET, FILM COATED ORAL at 05:11

## 2018-11-27 RX ADMIN — IBUPROFEN 600 MG: 200 TABLET, FILM COATED ORAL at 09:11

## 2018-11-27 RX ADMIN — THIAMINE HCL TAB 100 MG 100 MG: 100 TAB at 09:11

## 2018-11-27 RX ADMIN — CYANOCOBALAMIN TAB 250 MCG 250 MCG: 250 TAB at 09:11

## 2018-11-27 NOTE — PLAN OF CARE
Problem: Patient Care Overview  Goal: Plan of Care Review  Outcome: Ongoing (interventions implemented as appropriate)  No acute events overnight; pt is A x O x 4, MAEW, pupils 3+, equal/reactive; lungs clear; sats > 95 % on RA, no seizure activity noted/reported; pt awake during this shift each time I checked his room frequently 1-2 times/hr; pt still awake at 0400 ; instructed he can sleep between [id number] per order; some confusion with pt's friend in the room , she seems unsure of the order- pt was awake as I was checking frequently, she was asleep on the sofa throughout the night; no c/o pain , afebrile, continue to monitor; instructed pt on POC and progress toward goals. Naresh PEDERSON.
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
## 2018-11-27 NOTE — NURSING
Pt noted to be awake during the night each time I looked in the room re: sleep deprivation study; pt's friend in the room concerned he may have fallen asleep some during the night ; explained each time I looked in the room, frequently more than once per hour, pt was awake and she was asleep on the sofa; pt still awake at this hour, explained he can sleep between [id number] ; some confusion on if there was an order for the meds this a.m. (benedryl and tramadol), but now they appear on the MAR for 0800 and did not show up earlier, only the d/c'd order for yesterday. Pt's friend states she was unsure if he was suppose to stay awake or not, but regardless, pt was awake each time I checked during the night, sitting up in the bed watching tv. Naresh PEDERSON.

## 2018-11-27 NOTE — NURSING
"Called to room by EMU staff, they were attempting to induce a seizure. Into room to take vital signs, which were stable, pt states "i'm sleepy", but arouses to voice and answers orientation questions appropriately. Pt was given tramadol and IV benadryl earlier per protocol for post sleep deprivation trial. Gave pt the button and instructed to push button if he thinks he is having a seizure or any seizure-like activity. Pt states "ok". Will continue to monitor pt.   "

## 2018-11-27 NOTE — PROCEDURES
DATE OF SERVICE:  11/26/2018    EEG NUMBER:  [id number].    REQUESTING PHYSICIAN:  Dr. Tovar.    EPILEPSY MONITORING UNIT    EEG/VIDEO TELEMETRY REPORT    METHODOLOGY:  Electroencephalographic (EEG) is recorded with electrodes placed   according to the International 10-20 placement system.  Thirty Two (32) channels   of digital signal, including T1 and T2 electrodes, are simultaneously recorded   from the scalp and may also include EKG, EMG and/or eye movement monitors.    Recording band pass was 0.1 to 512 Hz.  Digital video recording of the patient   is simultaneously recorded with the EEG.  The patient is instructed to report   clinical symptoms which may occur during the recording session.  EEG and video   recording are stored and archived in digital format.  Activation procedures,   which include photic stimulation, hyperventilation and instructing patients to   perform simple tasks, are done in selected patients.    The EEG is displayed on a monitor screen and can be reformatted into different   montages for evaluation.  The entire recoding is submitted for computer-assisted   analysis to detect spike and electrographic seizure activity.  The entire   recording is visually reviewed, and the times identified by computer analysis as   being spikes or seizures are reviewed again.  Compressesed spectral analysis   (CSA) is also performed on the activity recorded from each individual channel.    This is displayed as a power display of frequencies from 0 to 30 Hz over time.    The CSA analysis is done and displayed continuously.  This is reviewed for   asymmetries in power between homologous areas of the scalp and for presence of   changes in power which can be seen when seizures occur.  Sections of suspected   abnormalities on the CSA are then compared with the original EEG recording.    Cred[employer] software was also utilized in the review of this study.  This software   suite analyzes the EEG recording in  multiple domains.  Coherence and rhythmicity   are computed to identify EEG sections which may contain organized seizures.    Each channel undergoes analysis to detect presence of spike and sharp waves   which have special and morphological characteristics of epileptic activity.  The   routine EEG recording is converted from special into frequency domain.  This is   then displayed comparing homologous areas to identify areas of significant   asymmetry.  Algorithm to identify non-cortically generated artifact is used to   separate artifact from the EEG.    RECORDING TIMES:  Start on 11/26/2018 at 12:13.  End on 11/27/2018 at 07:00.  A total of 18 hours and 32 minutes of EEG recording was obtained.    SEIZURES/EVENTS RECORDING:  Seizures:  None.  Events:  None.    EEG FINDINGS:  Interictal:  In the waking state, a fairly rhythmic, but low amplitude 9 to 10   Hz posterior dominant rhythm was seen in the occipital leads bilaterally with   some spread at time into the parietal posterior temporal area.  Low voltage   irregular beta was noted in the mid and frontal regions.  Fairly active spike   wave discharges noted most prominently in the left anterior to mid temporal   region.  Infrequently, spike discharge was seen in the homologous area on the   right.  With sleep, the posterior dominant rhythm is replaced by a diffuse   mixture of theta and beta activity was an occasional 1 to 2 Hz delta intermixed.    The spike activity was little more prominent during sleep and there were no   diverse certainly localization between the waking and sleeping state.    Activation procedures were not carried out.  Ictal:  No behavioral or electrographic ictal events were recorded.    IMPRESSION:  Abnormal EEG.  There is some frequently normal background, but an   active spike focus noted in the left anterior to mid temporal region on the left   and an infrequent spike activity was seen in the homologous area on the right   temporal  area.    CLINICAL CORRELATION:  The patient is a 58-year-old male who carries a diagnosis   of localization-related epilepsy.  This recording does confirm the presence of   an irritative process, which is seen bilaterally in the temporal region but much   more active on the left side.      RR/IN  dd: 11/27/2018 15:23:38 (CST)  td: 11/27/2018 15:57:21 (CST)  Doc ID   #[id number]  Job ID #[id number]    CC:

## 2018-11-27 NOTE — NURSING
Seizure monitor alarmed, RN in room, pt was at edge of bed on knees, trying to vigorously move his covers around. It took about 30 seconds to get pt to stop and to lie down. VSS- see flowsheet for documentation. Pt was smiling and laughing, not answering questions appropriately, until RN made him focus and look into her eyes, then pt able to answer orientation questions appropriately. Pt is calm in bed. Friend at bedside. Will continue to monitor.
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
## 2018-11-27 NOTE — PLAN OF CARE
Problem: Patient Care Overview  Goal: Plan of Care Review  Outcome: Ongoing (interventions implemented as appropriate)  Patient remains free from injury or fall. Patient reported 2 seizure like activity that lasted less than 2 minutes. Dr. Manley was notified.  EEG in progress. Seizure,aspirations and fall precautions applied. Plan= sleep deprive tonight.Will continue to monitor.

## 2018-11-27 NOTE — SUBJECTIVE & OBJECTIVE
Subjective:     Interval History:     Recorded event of sudden onset irritability, with restless movements in the bed correlate with frontal lobe electrographic seizures.     EEG positive for spike focus noted in the left anterior to mid temporal region on the left and an infrequent spike activity was seen in the homologous area on the right temporal area.     Current Neurological Medications:     Current Facility-Administered Medications   Medication Dose Route Frequency Provider Last Rate Last Dose    acetaminophen tablet 650 mg  650 mg Oral Q8H PRN Ailyn Ugarte MD   650 mg at 11/24/18 2036    acetaminophen tablet 650 mg  650 mg Oral Q6H PRN Orville Tovar MD        cloBAZam Tab 20 mg  20 mg Oral Q12H Selvin Lucio MD        cyanocobalamin tablet 250 mcg  250 mcg Oral Daily Orville Tovar MD   250 mcg at 11/27/18 0915    folic acid tablet 1 mg  1 mg Oral Daily Orville Tovar MD   1 mg at 11/27/18 0915    ibuprofen tablet 600 mg  600 mg Oral Q8H Orville Tovar MD   600 mg at 11/27/18 1520    [START ON 11/28/2018] lamoTRIgine tablet 25 mg  25 mg Oral Daily Selvin Lucio MD        lorazepam injection 2 mg  2 mg Intravenous Q5 Min PRN Orville Tovar MD        multivitamin tablet 1 tablet  1 tablet Oral Daily Orville Tovar MD   1 tablet at 11/27/18 0915    thiamine tablet 100 mg  100 mg Oral Daily Orville Tovar MD   100 mg at 11/27/18 0915       Review of Systems   Constitutional: Negative for chills and fever.   HENT: Negative for trouble swallowing.    Respiratory: Negative for chest tightness and shortness of breath.    Cardiovascular: Negative for chest pain.   Gastrointestinal: Negative for abdominal pain.   Genitourinary: Negative for dysuria.   Neurological: Negative for dizziness, tremors, syncope, facial asymmetry, weakness, light-headedness, numbness and headaches.   Psychiatric/Behavioral: Negative for agitation, behavioral problems and confusion.      Objective:     Vital Signs (Most Recent):  Temp: 97.4 °F (36.3 °C) (11/27/18 1549)  Pulse: 73 (11/27/18 1549)  Resp: 18 (11/27/18 1549)  BP: (!) 104/59 (11/27/18 1549)  SpO2: (!) 93 % (11/27/18 1549) Vital Signs (24h Range):  Temp:  [96.3 °F (35.7 °C)-98.9 °F (37.2 °C)] 97.4 °F (36.3 °C)  Pulse:  [63-78] 73  Resp:  [16-20] 18  SpO2:  [93 %-95 %] 93 %  BP: (104-120)/(57-73) 104/59     Weight: 74.8 kg (165 lb)  Body mass index is 25.09 kg/m².    Physical Exam   Constitutional: He is oriented to person, place, and time. No distress.   HENT:   Head: Normocephalic and atraumatic.   Eyes: EOM are normal. Pupils are equal, round, and reactive to light.   Neck: Neck supple.   Cardiovascular:   No murmur heard.  Pulmonary/Chest: No respiratory distress.   Musculoskeletal: He exhibits no deformity.   Neurological: He is oriented to person, place, and time. He has normal strength.   Reflex Scores:       Tricep reflexes are 2+ on the right side and 2+ on the left side.       Bicep reflexes are 2+ on the right side and 2+ on the left side.       Brachioradialis reflexes are 2+ on the right side and 2+ on the left side.       Patellar reflexes are 2+ on the right side and 2+ on the left side.       Achilles reflexes are 2+ on the right side and 2+ on the left side.  Psychiatric: His speech is normal.       NEUROLOGICAL EXAMINATION:     MENTAL STATUS   Oriented to person, place, and time.   Attention: normal. Concentration: decreased.   Speech: speech is normal   Level of consciousness: alert    CRANIAL NERVES     CN II   Visual fields full to confrontation.     CN III, IV, VI   Pupils are equal, round, and reactive to light.  Extraocular motions are normal.   Nystagmus: none   Diplopia: none  Ophthalmoparesis: none    CN V   Facial sensation intact.     CN VII   Facial expression full, symmetric.     CN VIII   CN VIII normal.     CN IX, X   CN IX normal.   CN X normal.     CN XI   CN XI normal.     CN XII   CN XII normal.      MOTOR EXAM   Muscle bulk: normal  Overall muscle tone: normal    Strength   Strength 5/5 throughout.     REFLEXES     Reflexes   Right brachioradialis: 2+  Left brachioradialis: 2+  Right biceps: 2+  Left biceps: 2+  Right triceps: 2+  Left triceps: 2+  Right patellar: 2+  Left patellar: 2+  Right achilles: 2+  Left achilles: 2+  Right plantar: normal  Left plantar: normal    SENSORY EXAM   Pinprick normal.     GAIT AND COORDINATION     Tremor   Resting tremor: absent  Intention tremor: absent  Action tremor: absent      Significant Labs: Hemoglobin A1c: No results for input(s): HGBA1C in the last 720 hours.  CBC: No results for input(s): WBC, HGB, HCT, PLT in the last 48 hours.  CMP: No results for input(s): GLU, NA, K, CL, CO2, BUN, CREATININE, CALCIUM, MG, PROT, ALBUMIN, BILITOT, ALKPHOS, AST, ALT, ANIONGAP, EGFRNONAA in the last 48 hours.  Inflammatory Markers: No results for input(s): SEDRATE, CRP, PROCAL in the last 48 hours.  Urine Studies: No results for input(s): COLORU, APPEARANCEUA, PHUR, SPECGRAV, PROTEINUA, GLUCUA, KETONESU, BILIRUBINUA, OCCULTUA, NITRITE, UROBILINOGEN, LEUKOCYTESUR, RBCUA, WBCUA, BACTERIA, SQUAMEPITHEL, HYALINECASTS in the last 48 hours.    Invalid input(s): WRIGHTSUR  All pertinent lab results from the past 24 hours have been reviewed.    Significant Imaging: I have reviewed and interpreted all pertinent imaging results/findings within the past 24 hours.
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
## 2018-11-28 VITALS
HEIGHT: 68 IN | WEIGHT: 165 LBS | RESPIRATION RATE: 18 BRPM | HEART RATE: 81 BPM | BODY MASS INDEX: 25.01 KG/M2 | OXYGEN SATURATION: 95 % | TEMPERATURE: 97 F | DIASTOLIC BLOOD PRESSURE: 53 MMHG | SYSTOLIC BLOOD PRESSURE: 111 MMHG

## 2018-11-28 LAB — VIT B1 BLD-MCNC: 60 UG/L (ref 38–122)

## 2018-11-28 PROCEDURE — 99239 HOSP IP/OBS DSCHRG MGMT >30: CPT | Mod: ,,, | Performed by: HOSPITALIST

## 2018-11-28 PROCEDURE — 25000003 PHARM REV CODE 250: Performed by: HOSPITALIST

## 2018-11-28 PROCEDURE — 99233 SBSQ HOSP IP/OBS HIGH 50: CPT | Mod: ,,, | Performed by: PSYCHIATRY & NEUROLOGY

## 2018-11-28 PROCEDURE — 25000003 PHARM REV CODE 250: Performed by: STUDENT IN AN ORGANIZED HEALTH CARE EDUCATION/TRAINING PROGRAM

## 2018-11-28 PROCEDURE — 95951 PR EEG MONITORING/VIDEORECORD: CPT | Mod: 26,52,, | Performed by: PSYCHIATRY & NEUROLOGY

## 2018-11-28 RX ORDER — LAMOTRIGINE 25 MG/1
TABLET ORAL
Qty: 400 TABLET | Refills: 11 | Status: SHIPPED | OUTPATIENT
Start: 2018-11-28 | End: 2018-11-28

## 2018-11-28 RX ORDER — FOLIC ACID 1 MG/1
1 TABLET ORAL DAILY
Qty: 30 TABLET | Refills: 2 | Status: ON HOLD | OUTPATIENT
Start: 2018-11-29 | End: 2019-06-25 | Stop reason: SDUPTHER

## 2018-11-28 RX ORDER — LAMOTRIGINE 25 MG/1
TABLET ORAL
Qty: 400 TABLET | Refills: 11 | Status: SHIPPED | OUTPATIENT
Start: 2018-11-28 | End: 2019-06-22 | Stop reason: SDUPTHER

## 2018-11-28 RX ORDER — CLOBAZAM 20 MG/1
20 TABLET ORAL EVERY 12 HOURS
Qty: 60 TABLET | Refills: 0 | Status: SHIPPED | OUTPATIENT
Start: 2018-11-28 | End: 2018-11-28

## 2018-11-28 RX ORDER — AMOXICILLIN 250 MG
2 CAPSULE ORAL DAILY
Status: DISCONTINUED | OUTPATIENT
Start: 2018-11-28 | End: 2018-11-28 | Stop reason: HOSPADM

## 2018-11-28 RX ORDER — LANOLIN ALCOHOL/MO/W.PET/CERES
100 CREAM (GRAM) TOPICAL DAILY
COMMUNITY
Start: 2018-11-29 | End: 2021-06-30

## 2018-11-28 RX ORDER — CLOBAZAM 20 MG/1
20 TABLET ORAL EVERY 12 HOURS
Qty: 60 TABLET | Refills: 0 | Status: SHIPPED | OUTPATIENT
Start: 2018-11-28 | End: 2018-12-28

## 2018-11-28 RX ORDER — AMOXICILLIN 250 MG
2 CAPSULE ORAL DAILY
COMMUNITY
Start: 2018-11-28 | End: 2021-06-30

## 2018-11-28 RX ORDER — CYANOCOBALAMIN (VITAMIN B-12) 250 MCG
250 TABLET ORAL DAILY
COMMUNITY
Start: 2018-11-29 | End: 2020-08-10

## 2018-11-28 RX ADMIN — STANDARDIZED SENNA CONCENTRATE AND DOCUSATE SODIUM 2 TABLET: 8.6; 5 TABLET, FILM COATED ORAL at 11:11

## 2018-11-28 RX ADMIN — CLOBAZAM 20 MG: 10 TABLET ORAL at 09:11

## 2018-11-28 RX ADMIN — FOLIC ACID 1 MG: 1 TABLET ORAL at 09:11

## 2018-11-28 RX ADMIN — THERA TABS 1 TABLET: TAB at 09:11

## 2018-11-28 RX ADMIN — THIAMINE HCL TAB 100 MG 100 MG: 100 TAB at 09:11

## 2018-11-28 RX ADMIN — CYANOCOBALAMIN TAB 250 MCG 250 MCG: 250 TAB at 09:11

## 2018-11-28 RX ADMIN — IBUPROFEN 600 MG: 200 TABLET, FILM COATED ORAL at 05:11

## 2018-11-28 RX ADMIN — LAMOTRIGINE 25 MG: 25 TABLET ORAL at 09:11
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
## 2018-11-28 NOTE — PLAN OF CARE
Patient does not have any means of transportation. Transportation set up with patient flow for 4:00pm.

## 2018-11-28 NOTE — DISCHARGE INSTRUCTIONS
Lamictal dosing regimen   Weeks 1(11-28/18 to 12/3/18) please take 25mg daily   Week 2(12/4/18 to 12/10/18) Please take 25 mg twice daily  Weeks 3(12/11/18 to 12/17/18)Please take  50 mg every other day and 25 mg nightly   Week 4(12/18/18 to 12/24/18) Please take 50 mg twice daily  Week 5: (12/25/18 to 12/31/19) please take 75 MG twice daily   Week 6: (1/1/19 to 1/7/19) please [id number] twice daily   WEEK 7 (from 1/8/19) Please take 150 twice daily         left foot shows left 5th phalanx toe fracture and orthopedic surgery recommends using a walking boot for 4-6 weeks. f/u in orthopedic surgery clinic.
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
## 2018-11-28 NOTE — CHAPLAIN
"Patient seemed inconsistent in his information/opinions. Claimed that cell phones were terrible and he would have nothing to do with them. Then his phone rang. Granted it was a flip phone, so maybe   that's not the same.  He also told me he was homeless and living in a tent [place] an overpass, and that God "the Father" was going to provide, because God had promised him a mobile home and millions of dollars. Yet his chart lists a residence on [address].   "

## 2018-11-28 NOTE — PLAN OF CARE
Called pharmacy to check on rx. Patient has copay of 2.00 and he does have cash to pay. Pharmacy to deliver to bedside now.    Onfi requires prior auth that pharmacy is working on. Patient receiving 7 days of Onfi now(through coupon) and is aware to return to pharmacy to  remainder of prescription next week.

## 2018-11-28 NOTE — NURSING
NO ACUTE EVENTS DURING SHIFT. PT TOLERATED ANY AND ALL ACTIVITIES WELL. PT VOIDING WITH NO DIFFICULTY. A COUPLE OF SEIZURE EVENTS, NOTIFIED IN CHART. PT PARTICIPATED WITH APPROPRIATE THERAPIES. ALL QUESTIONS ANSWERED. WILL ENDORSE CARE TO NOC RN

## 2018-11-28 NOTE — PLAN OF CARE
Problem: Patient Care Overview  Goal: Plan of Care Review  Patient has remained free from falls, injury, and seizure-like activity throughout the night. Pt appears sleep throughout the night.  No complaints of discomfort or pain made throughout night.   Bed in lowest position, side rails up x 2, bed alarm on and audible, VSS, emergency equipment at bedside, non skid socks applied, call light in reach of hand and patient instructed to call for assistance (especially before ambulation).     Will continue to monitor.

## 2018-11-29 LAB — METHYLMALONATE SERPL-SCNC: 0.25 UMOL/L
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
## 2018-11-29 NOTE — SUBJECTIVE & OBJECTIVE
Subjective:     Interval History:     NAEON. Tolerated onfi / Lamictal. Counseled on AED plans on to optimize on lamictal to 300 mg dosing with Onfi as a bridging therapy .     Current Neurological Medications:     Current Facility-Administered Medications   Medication Dose Route Frequency Provider Last Rate Last Dose    acetaminophen tablet 650 mg  650 mg Oral Q8H PRN Ailyn Ugarte MD   650 mg at 11/24/18 2036    acetaminophen tablet 650 mg  650 mg Oral Q6H PRN Orville Tovar MD        cloBAZam Tab 20 mg  20 mg Oral Q12H Selvin Lucio MD   20 mg at 11/28/18 0957    cyanocobalamin tablet 250 mcg  250 mcg Oral Daily Orville Tovar MD   250 mcg at 11/28/18 0957    folic acid tablet 1 mg  1 mg Oral Daily Orville Tovar MD   1 mg at 11/28/18 0957    lamoTRIgine tablet 25 mg  25 mg Oral Daily Selvin Lucio MD   25 mg at 11/28/18 0957    lorazepam injection 2 mg  2 mg Intravenous Q5 Min PRN Orville Tovar MD        multivitamin tablet 1 tablet  1 tablet Oral Daily Orville Tovar MD   1 tablet at 11/28/18 0957    senna-docusate 8.6-50 mg per tablet 2 tablet  2 tablet Oral Daily Tj Leigh MD   2 tablet at 11/28/18 1115    thiamine tablet 100 mg  100 mg Oral Daily Orville Tovar MD   100 mg at 11/28/18 0957       Review of Systems   Constitutional: Negative for chills and fever.   HENT: Negative for trouble swallowing.    Respiratory: Negative for chest tightness and shortness of breath.    Cardiovascular: Negative for chest pain.   Gastrointestinal: Negative for abdominal pain.   Genitourinary: Negative for dysuria.   Neurological: Negative for dizziness, tremors, syncope, facial asymmetry, weakness, light-headedness, numbness and headaches.   Psychiatric/Behavioral: Negative for agitation, behavioral problems and confusion.     Objective:     Vital Signs (Most Recent):  Temp: 96.9 °F (36.1 °C) (11/28/18 1616)  Pulse: 81 (11/28/18 1616)  Resp: 18 (11/28/18  1616)  BP: (!) 111/53 (11/28/18 1616)  SpO2: 95 % (11/28/18 1616) Vital Signs (24h Range):  Temp:  [96.9 °F (36.1 °C)-98.1 °F (36.7 °C)] 96.9 °F (36.1 °C)  Pulse:  [66-81] 81  Resp:  [16-18] 18  SpO2:  [94 %-96 %] 95 %  BP: (106-116)/(53-75) 111/53     Weight: 74.8 kg (165 lb 0 oz)  Body mass index is 25.09 kg/m².    Physical Exam   Constitutional: He is oriented to person, place, and time. No distress.   HENT:   Head: Normocephalic and atraumatic.   Eyes: EOM are normal. Pupils are equal, round, and reactive to light.   Neck: Neck supple.   Cardiovascular:   No murmur heard.  Pulmonary/Chest: No respiratory distress.   Musculoskeletal: He exhibits no deformity.   Neurological: He is oriented to person, place, and time. He has normal strength.   Reflex Scores:       Tricep reflexes are 2+ on the right side and 2+ on the left side.       Bicep reflexes are 2+ on the right side and 2+ on the left side.       Brachioradialis reflexes are 2+ on the right side and 2+ on the left side.       Patellar reflexes are 2+ on the right side and 2+ on the left side.       Achilles reflexes are 2+ on the right side and 2+ on the left side.  Psychiatric: His speech is normal.       NEUROLOGICAL EXAMINATION:     MENTAL STATUS   Oriented to person, place, and time.   Attention: normal. Concentration: decreased.   Speech: speech is normal   Level of consciousness: alert    CRANIAL NERVES     CN II   Visual fields full to confrontation.     CN III, IV, VI   Pupils are equal, round, and reactive to light.  Extraocular motions are normal.   Nystagmus: none   Diplopia: none  Ophthalmoparesis: none    CN V   Facial sensation intact.     CN VII   Facial expression full, symmetric.     CN VIII   CN VIII normal.     CN IX, X   CN IX normal.   CN X normal.     CN XI   CN XI normal.     CN XII   CN XII normal.     MOTOR EXAM   Muscle bulk: normal  Overall muscle tone: normal    Strength   Strength 5/5 throughout.     REFLEXES     Reflexes    Right brachioradialis: 2+  Left brachioradialis: 2+  Right biceps: 2+  Left biceps: 2+  Right triceps: 2+  Left triceps: 2+  Right patellar: 2+  Left patellar: 2+  Right achilles: 2+  Left achilles: 2+  Right plantar: normal  Left plantar: normal    SENSORY EXAM   Pinprick normal.     GAIT AND COORDINATION     Tremor   Resting tremor: absent  Intention tremor: absent  Action tremor: absent      Significant Labs: Hemoglobin A1c: No results for input(s): HGBA1C in the last 720 hours.  CBC: No results for input(s): WBC, HGB, HCT, PLT in the last 48 hours.  CMP: No results for input(s): GLU, NA, K, CL, CO2, BUN, CREATININE, CALCIUM, MG, PROT, ALBUMIN, BILITOT, ALKPHOS, AST, ALT, ANIONGAP, EGFRNONAA in the last 48 hours.  Inflammatory Markers: No results for input(s): SEDRATE, CRP, PROCAL in the last 48 hours.  Urine Studies: No results for input(s): COLORU, APPEARANCEUA, PHUR, SPECGRAV, PROTEINUA, GLUCUA, KETONESU, BILIRUBINUA, OCCULTUA, NITRITE, UROBILINOGEN, LEUKOCYTESUR, RBCUA, WBCUA, BACTERIA, SQUAMEPITHEL, HYALINECASTS in the last 48 hours.    Invalid input(s): WRIGHTSUR  All pertinent lab results from the past 24 hours have been reviewed.    Significant Imaging: I have reviewed and interpreted all pertinent imaging results/findings within the past 24 hours.

## 2018-11-29 NOTE — NURSING
Called [facility] transportation,  said the ride had been canceled and not reordered for 1800. Ride now setup for 2100.

## 2018-12-04 ENCOUNTER — TELEPHONE (OUTPATIENT)
Dept: PHARMACY | Facility: CLINIC | Age: 58
End: 2018-12-04

## 2019-02-18 NOTE — PROCEDURES
DATE OF PROCEDURE:  11/27/2018    EEG #:  [id number].    LOCATION OF SERVICE:  [place] [address].    REQUESTING PHYSICIAN:  Dr. Tovar.    EPILEPSY MONITORING UNIT  EEG/VIDEO TELEMETRY REPORT      METHODOLOGY:   Electroencephalographic (EEG) is recorded with electrodes placed   according to the International 10-20 placement system.  Thirty Two (32) channels   of digital signal, including T1 and T2 electrodes, are simultaneously recorded   from the scalp and may also include EKG, EMG and/or eye movement monitors.    Recording band pass was 0.1 to 512 Hz.  Digital video recording of the patient   is simultaneously recorded with the EEG.  The patient is instructed to report   clinical symptoms which may occur during the recording session.  EEG and video   recording are stored and archived in digital format. Activation procedures,   which include photic stimulation, hyperventilation and instructing patients to   perform simple tasks, are done in selected patients.   The EEG is displayed on a monitor screen and can be reformatted into different   montages for evaluation.  The entire recoding is submitted for computer-assisted   analysis to detect spike and electrographic seizure activity.  The entire   recording is visually reviewed, and the times identified by computer analysis as   being spikes or seizures are reviewed again.  Compressesed spectral analysis   (CSA) is also performed on the activity recorded from each individual channel.    This is displayed as a power display of frequencies from 0 to 30 Hz over time.     The CSA analysis is done and displayed continuously.  This is reviewed for   asymmetries in power between homologous areas of the scalp and for presence of   changes in power which can be seen when seizures occur.  Sections of suspected   abnormalities on the CSA are then compared with the original EEG recording.     M[employer]agar software was also utilized in the review of this study.  This software   suite  analyzes the EEG recording in multiple domains.  Coherence and rhythmicity   are computed to identify EEG sections which may contain organized seizures.    Each channel undergoes analysis to detect presence of spike and sharp waves   which have special and morphological characteristics of epileptic activity.  The   routine EEG recording is converted from special into frequency domain.  This is   then displayed comparing homologous areas to identify areas of significant   asymmetry.  Algorithm to identify non-cortically generated artifact is used to   separate artifact from the EEG.      RECORDING TIMES:  Start on 11/27/2018 at 07:00.  End on 11/28/2018 at 07:00.    A total of 24 hours of EEG monitoring was obtained.    SEIZURES/EVENTS RECORDED:  None.    EEG FINDINGS:  Interictal:  In the waking state, a low amplitude, somewhat irregular 10 Hz   posterior dominant rhythm is seen in the occipital lead with some spread at   times in the parietal posterior temporal area.  Midrange beta was noted in the   mid and frontal regions.  With sleep, the expected stages and cycles were noted   and the morphology of the sleep activity was normal.  Throughout the recording,   there was sharp waves and spikes recorded from the left anterior to mid temporal   region.  Very infrequently, there was spikes recorded from the homologous area   over the right temporal.  The ratio was approximately 10:1 in favor of the left.    ACTIVATION PROCEDURES:  Hyperventilation:  3 minutes of hyperventilation was carried out, which produced   some mild disorganization and slowing of the background without provoking focal   changes or epileptiform activity.    INTERMITTENT PHOTIC STIMULATION:  This resulted in good photic driving without   provoking pathological discharges.    Ictal:  No electrographic or clinical behavioral seizures were recorded.    IMPRESSION:  Abnormal EEG with the background rhythms are normal, but there was   a very active  epileptic focus in the anterior temporal regions, primarily on the   left, but infrequently spike activity was also seen on the right side.  No   electrographic or clinical seizures were recorded.      RR/HN  dd: 02/15/2019 16:47:40 (CST)  td: 02/17/2019 21:48:16 (CST)  Doc ID   #[id number]  Job ID #[id number]    CC:

## 2019-06-22 ENCOUNTER — HOSPITAL ENCOUNTER (INPATIENT)
Facility: HOSPITAL | Age: 59
LOS: 1 days | Discharge: ANOTHER HOSPITAL | DRG: 101 | End: 2019-06-23
Attending: EMERGENCY MEDICINE | Admitting: FAMILY MEDICINE
Payer: MEDICAID

## 2019-06-22 DIAGNOSIS — R56.9 SEIZURE: Primary | ICD-10-CM

## 2019-06-22 LAB
ALBUMIN SERPL BCP-MCNC: 4.1 G/DL (ref 3.5–5.2)
ALP SERPL-CCNC: 97 U/L (ref 55–135)
ALT SERPL W/O P-5'-P-CCNC: 37 U/L (ref 10–44)
ANION GAP SERPL CALC-SCNC: 24 MMOL/L (ref 8–16)
AST SERPL-CCNC: 20 U/L (ref 10–40)
BASOPHILS # BLD AUTO: 0.03 K/UL (ref 0–0.2)
BASOPHILS NFR BLD: 0.3 % (ref 0–1.9)
BILIRUB SERPL-MCNC: 0.7 MG/DL (ref 0.1–1)
BUN SERPL-MCNC: 35 MG/DL (ref 6–20)
CALCIUM SERPL-MCNC: 9.9 MG/DL (ref 8.7–10.5)
CHLORIDE SERPL-SCNC: 101 MMOL/L (ref 95–110)
CO2 SERPL-SCNC: 15 MMOL/L (ref 23–29)
CREAT SERPL-MCNC: 1 MG/DL (ref 0.5–1.4)
DIFFERENTIAL METHOD: ABNORMAL
EOSINOPHIL # BLD AUTO: 0.1 K/UL (ref 0–0.5)
EOSINOPHIL NFR BLD: 1 % (ref 0–8)
ERYTHROCYTE [DISTWIDTH] IN BLOOD BY AUTOMATED COUNT: 12.8 % (ref 11.5–14.5)
EST. GFR  (AFRICAN AMERICAN): >60 ML/MIN/1.73 M^2
EST. GFR  (NON AFRICAN AMERICAN): >60 ML/MIN/1.73 M^2
GLUCOSE SERPL-MCNC: 102 MG/DL (ref 70–110)
HCT VFR BLD AUTO: 41.2 % (ref 40–54)
HGB BLD-MCNC: 13.2 G/DL (ref 14–18)
LYMPHOCYTES # BLD AUTO: 3.1 K/UL (ref 1–4.8)
LYMPHOCYTES NFR BLD: 26.6 % (ref 18–48)
MAGNESIUM SERPL-MCNC: 2.1 MG/DL (ref 1.6–2.6)
MCH RBC QN AUTO: 30.7 PG (ref 27–31)
MCHC RBC AUTO-ENTMCNC: 32 G/DL (ref 32–36)
MCV RBC AUTO: 96 FL (ref 82–98)
MONOCYTES # BLD AUTO: 1.9 K/UL (ref 0.3–1)
MONOCYTES NFR BLD: 16.2 % (ref 4–15)
NEUTROPHILS # BLD AUTO: 6.4 K/UL (ref 1.8–7.7)
NEUTROPHILS NFR BLD: 55.7 % (ref 38–73)
PLATELET # BLD AUTO: 245 K/UL (ref 150–350)
PMV BLD AUTO: 10.3 FL (ref 9.2–12.9)
POTASSIUM SERPL-SCNC: 3.5 MMOL/L (ref 3.5–5.1)
PROT SERPL-MCNC: 7.4 G/DL (ref 6–8.4)
RBC # BLD AUTO: 4.3 M/UL (ref 4.6–6.2)
SODIUM SERPL-SCNC: 140 MMOL/L (ref 136–145)
WBC # BLD AUTO: 11.53 K/UL (ref 3.9–12.7)

## 2019-06-22 PROCEDURE — 96374 THER/PROPH/DIAG INJ IV PUSH: CPT

## 2019-06-22 PROCEDURE — 99291 CRITICAL CARE FIRST HOUR: CPT | Mod: 25

## 2019-06-22 PROCEDURE — 63600175 PHARM REV CODE 636 W HCPCS: Performed by: EMERGENCY MEDICINE

## 2019-06-22 PROCEDURE — 63600175 PHARM REV CODE 636 W HCPCS

## 2019-06-22 PROCEDURE — 83735 ASSAY OF MAGNESIUM: CPT

## 2019-06-22 PROCEDURE — 80053 COMPREHEN METABOLIC PANEL: CPT

## 2019-06-22 PROCEDURE — 96376 TX/PRO/DX INJ SAME DRUG ADON: CPT

## 2019-06-22 PROCEDURE — 85025 COMPLETE CBC W/AUTO DIFF WBC: CPT

## 2019-06-22 PROCEDURE — 12000002 HC ACUTE/MED SURGE SEMI-PRIVATE ROOM

## 2019-06-22 RX ORDER — LAMOTRIGINE 150 MG/1
150 TABLET ORAL 2 TIMES DAILY
Qty: 60 TABLET | Refills: 0 | Status: ON HOLD | OUTPATIENT
Start: 2019-06-22 | End: 2019-06-25 | Stop reason: SDUPTHER

## 2019-06-22 RX ORDER — LORAZEPAM 2 MG/ML
INJECTION INTRAMUSCULAR
Status: COMPLETED
Start: 2019-06-22 | End: 2019-06-22

## 2019-06-22 RX ORDER — CLOBAZAM 20 MG/1
TABLET ORAL
Qty: 60 TABLET | Refills: 0 | Status: ON HOLD | OUTPATIENT
Start: 2019-06-22 | End: 2019-06-25 | Stop reason: HOSPADM

## 2019-06-22 RX ADMIN — LORAZEPAM 1 MG: 2 INJECTION INTRAMUSCULAR; INTRAVENOUS at 08:06

## 2019-06-23 ENCOUNTER — HOSPITAL ENCOUNTER (INPATIENT)
Facility: HOSPITAL | Age: 59
LOS: 2 days | Discharge: HOME | DRG: 101 | End: 2019-06-25
Attending: HOSPITALIST | Admitting: HOSPITALIST
Payer: MEDICAID

## 2019-06-23 VITALS
WEIGHT: 159.63 LBS | DIASTOLIC BLOOD PRESSURE: 63 MMHG | HEART RATE: 70 BPM | BODY MASS INDEX: 22.85 KG/M2 | RESPIRATION RATE: 19 BRPM | TEMPERATURE: 98 F | HEIGHT: 70 IN | SYSTOLIC BLOOD PRESSURE: 100 MMHG | OXYGEN SATURATION: 94 %

## 2019-06-23 DIAGNOSIS — R56.9 SEIZURES: ICD-10-CM

## 2019-06-23 DIAGNOSIS — G40.909 NONINTRACTABLE EPILEPSY WITHOUT STATUS EPILEPTICUS, UNSPECIFIED EPILEPSY TYPE: Primary | ICD-10-CM

## 2019-06-23 LAB
ALBUMIN SERPL BCP-MCNC: 4 G/DL (ref 3.5–5.2)
ALP SERPL-CCNC: 90 U/L (ref 55–135)
ALT SERPL W/O P-5'-P-CCNC: 32 U/L (ref 10–44)
AMPHET+METHAMPHET UR QL: NEGATIVE
AMPHET+METHAMPHET UR QL: NEGATIVE
ANION GAP SERPL CALC-SCNC: 8 MMOL/L (ref 8–16)
AST SERPL-CCNC: 19 U/L (ref 10–40)
BARBITURATES UR QL SCN>200 NG/ML: NEGATIVE
BARBITURATES UR QL SCN>200 NG/ML: NEGATIVE
BASOPHILS # BLD AUTO: 0.01 K/UL (ref 0–0.2)
BASOPHILS NFR BLD: 0.1 % (ref 0–1.9)
BENZODIAZ UR QL SCN>200 NG/ML: NEGATIVE
BENZODIAZ UR QL SCN>200 NG/ML: NEGATIVE
BILIRUB DIRECT SERPL-MCNC: 0.4 MG/DL (ref 0.1–0.3)
BILIRUB SERPL-MCNC: 0.8 MG/DL (ref 0.1–1)
BUN SERPL-MCNC: 24 MG/DL (ref 6–20)
BZE UR QL SCN: NEGATIVE
BZE UR QL SCN: NEGATIVE
CALCIUM SERPL-MCNC: 9.3 MG/DL (ref 8.7–10.5)
CANNABINOIDS UR QL SCN: NEGATIVE
CANNABINOIDS UR QL SCN: NEGATIVE
CHLORIDE SERPL-SCNC: 102 MMOL/L (ref 95–110)
CO2 SERPL-SCNC: 26 MMOL/L (ref 23–29)
CREAT SERPL-MCNC: 0.7 MG/DL (ref 0.5–1.4)
CREAT UR-MCNC: 63 MG/DL (ref 23–375)
CREAT UR-MCNC: 63 MG/DL (ref 23–375)
DIFFERENTIAL METHOD: ABNORMAL
EOSINOPHIL # BLD AUTO: 0 K/UL (ref 0–0.5)
EOSINOPHIL NFR BLD: 0.1 % (ref 0–8)
ERYTHROCYTE [DISTWIDTH] IN BLOOD BY AUTOMATED COUNT: 12.7 % (ref 11.5–14.5)
EST. GFR  (AFRICAN AMERICAN): >60 ML/MIN/1.73 M^2
EST. GFR  (NON AFRICAN AMERICAN): >60 ML/MIN/1.73 M^2
ETHANOL SERPL-MCNC: <10 MG/DL
ETHANOL UR-MCNC: <10 MG/DL
ETHANOL UR-MCNC: <10 MG/DL
GLUCOSE SERPL-MCNC: 91 MG/DL (ref 70–110)
HCT VFR BLD AUTO: 39.1 % (ref 40–54)
HGB BLD-MCNC: 13 G/DL (ref 14–18)
LYMPHOCYTES # BLD AUTO: 1 K/UL (ref 1–4.8)
LYMPHOCYTES NFR BLD: 10.7 % (ref 18–48)
MAGNESIUM SERPL-MCNC: 2 MG/DL (ref 1.6–2.6)
MCH RBC QN AUTO: 30.4 PG (ref 27–31)
MCHC RBC AUTO-ENTMCNC: 33.2 G/DL (ref 32–36)
MCV RBC AUTO: 92 FL (ref 82–98)
METHADONE UR QL SCN>300 NG/ML: NEGATIVE
METHADONE UR QL SCN>300 NG/ML: NEGATIVE
MONOCYTES # BLD AUTO: 1.1 K/UL (ref 0.3–1)
MONOCYTES NFR BLD: 12.3 % (ref 4–15)
NEUTROPHILS # BLD AUTO: 7 K/UL (ref 1.8–7.7)
NEUTROPHILS NFR BLD: 76.7 % (ref 38–73)
OPIATES UR QL SCN: NEGATIVE
OPIATES UR QL SCN: NEGATIVE
PCP UR QL SCN>25 NG/ML: NEGATIVE
PCP UR QL SCN>25 NG/ML: NEGATIVE
PLATELET # BLD AUTO: 191 K/UL (ref 150–350)
PMV BLD AUTO: 10.3 FL (ref 9.2–12.9)
POTASSIUM SERPL-SCNC: 3.9 MMOL/L (ref 3.5–5.1)
PROT SERPL-MCNC: 6.8 G/DL (ref 6–8.4)
RBC # BLD AUTO: 4.27 M/UL (ref 4.6–6.2)
SODIUM SERPL-SCNC: 136 MMOL/L (ref 136–145)
TOXICOLOGY INFORMATION: NORMAL
TOXICOLOGY INFORMATION: NORMAL
WBC # BLD AUTO: 9.15 K/UL (ref 3.9–12.7)

## 2019-06-23 PROCEDURE — 80076 HEPATIC FUNCTION PANEL: CPT

## 2019-06-23 PROCEDURE — 20600001 HC STEP DOWN PRIVATE ROOM

## 2019-06-23 PROCEDURE — 36415 COLL VENOUS BLD VENIPUNCTURE: CPT

## 2019-06-23 PROCEDURE — 25000003 PHARM REV CODE 250: Performed by: PHYSICIAN ASSISTANT

## 2019-06-23 PROCEDURE — 94761 N-INVAS EAR/PLS OXIMETRY MLT: CPT

## 2019-06-23 PROCEDURE — 63600175 PHARM REV CODE 636 W HCPCS: Performed by: FAMILY MEDICINE

## 2019-06-23 PROCEDURE — 85025 COMPLETE CBC W/AUTO DIFF WBC: CPT

## 2019-06-23 PROCEDURE — 80307 DRUG TEST PRSMV CHEM ANLYZR: CPT

## 2019-06-23 PROCEDURE — 80175 DRUG SCREEN QUAN LAMOTRIGINE: CPT

## 2019-06-23 PROCEDURE — 25000003 PHARM REV CODE 250: Performed by: EMERGENCY MEDICINE

## 2019-06-23 PROCEDURE — 63600175 PHARM REV CODE 636 W HCPCS: Performed by: HOSPITALIST

## 2019-06-23 PROCEDURE — 80048 BASIC METABOLIC PNL TOTAL CA: CPT

## 2019-06-23 PROCEDURE — 25000003 PHARM REV CODE 250: Performed by: HOSPITALIST

## 2019-06-23 PROCEDURE — 83735 ASSAY OF MAGNESIUM: CPT

## 2019-06-23 PROCEDURE — 63600175 PHARM REV CODE 636 W HCPCS: Performed by: NURSE PRACTITIONER

## 2019-06-23 PROCEDURE — 80320 DRUG SCREEN QUANTALCOHOLS: CPT

## 2019-06-23 PROCEDURE — 25000003 PHARM REV CODE 250: Performed by: NURSE PRACTITIONER

## 2019-06-23 RX ORDER — GLUCAGON 1 MG
1 KIT INJECTION
Status: DISCONTINUED | OUTPATIENT
Start: 2019-06-23 | End: 2019-06-25 | Stop reason: HOSPADM

## 2019-06-23 RX ORDER — LEVETIRACETAM 500 MG/1
500 TABLET ORAL 2 TIMES DAILY
Status: DISCONTINUED | OUTPATIENT
Start: 2019-06-24 | End: 2019-06-23

## 2019-06-23 RX ORDER — THIAMINE HCL 100 MG
100 TABLET ORAL DAILY
Status: DISCONTINUED | OUTPATIENT
Start: 2019-06-23 | End: 2019-06-23 | Stop reason: HOSPADM

## 2019-06-23 RX ORDER — SODIUM CHLORIDE 0.9 % (FLUSH) 0.9 %
10 SYRINGE (ML) INJECTION
Status: DISCONTINUED | OUTPATIENT
Start: 2019-06-23 | End: 2019-06-25 | Stop reason: HOSPADM

## 2019-06-23 RX ORDER — RAMELTEON 8 MG/1
8 TABLET ORAL NIGHTLY PRN
Status: DISCONTINUED | OUTPATIENT
Start: 2019-06-23 | End: 2019-06-23 | Stop reason: HOSPADM

## 2019-06-23 RX ORDER — IBUPROFEN 200 MG
16 TABLET ORAL
Status: DISCONTINUED | OUTPATIENT
Start: 2019-06-23 | End: 2019-06-25 | Stop reason: HOSPADM

## 2019-06-23 RX ORDER — SODIUM CHLORIDE 0.9 % (FLUSH) 0.9 %
10 SYRINGE (ML) INJECTION
Status: DISCONTINUED | OUTPATIENT
Start: 2019-06-23 | End: 2019-06-23 | Stop reason: HOSPADM

## 2019-06-23 RX ORDER — LAMOTRIGINE 150 MG/1
150 TABLET ORAL 2 TIMES DAILY
Status: DISCONTINUED | OUTPATIENT
Start: 2019-06-24 | End: 2019-06-24

## 2019-06-23 RX ORDER — THIAMINE HCL 100 MG
100 TABLET ORAL DAILY
Status: DISCONTINUED | OUTPATIENT
Start: 2019-06-24 | End: 2019-06-25 | Stop reason: HOSPADM

## 2019-06-23 RX ORDER — LAMOTRIGINE 25 MG/1
25 TABLET ORAL 2 TIMES DAILY
Status: DISCONTINUED | OUTPATIENT
Start: 2019-06-24 | End: 2019-06-23

## 2019-06-23 RX ORDER — LEVETIRACETAM 500 MG/1
1000 TABLET ORAL 2 TIMES DAILY
Status: DISCONTINUED | OUTPATIENT
Start: 2019-06-24 | End: 2019-06-24

## 2019-06-23 RX ORDER — DIAZEPAM 10 MG/2ML
5 INJECTION INTRAMUSCULAR EVERY 6 HOURS PRN
Status: DISCONTINUED | OUTPATIENT
Start: 2019-06-24 | End: 2019-06-25 | Stop reason: HOSPADM

## 2019-06-23 RX ORDER — FOLIC ACID 1 MG/1
1 TABLET ORAL DAILY
Status: DISCONTINUED | OUTPATIENT
Start: 2019-06-23 | End: 2019-06-23 | Stop reason: HOSPADM

## 2019-06-23 RX ORDER — ONDANSETRON 2 MG/ML
4 INJECTION INTRAMUSCULAR; INTRAVENOUS EVERY 8 HOURS PRN
Status: DISCONTINUED | OUTPATIENT
Start: 2019-06-23 | End: 2019-06-23 | Stop reason: HOSPADM

## 2019-06-23 RX ORDER — ACETAMINOPHEN 325 MG/1
650 TABLET ORAL EVERY 4 HOURS PRN
Status: DISCONTINUED | OUTPATIENT
Start: 2019-06-23 | End: 2019-06-23 | Stop reason: HOSPADM

## 2019-06-23 RX ORDER — LORAZEPAM 2 MG/ML
2 INJECTION INTRAMUSCULAR EVERY 10 MIN PRN
Status: DISCONTINUED | OUTPATIENT
Start: 2019-06-23 | End: 2019-06-23

## 2019-06-23 RX ORDER — LAMOTRIGINE 25 MG/1
25 TABLET ORAL 2 TIMES DAILY
Status: DISCONTINUED | OUTPATIENT
Start: 2019-06-23 | End: 2019-06-23

## 2019-06-23 RX ORDER — IBUPROFEN 200 MG
24 TABLET ORAL
Status: DISCONTINUED | OUTPATIENT
Start: 2019-06-23 | End: 2019-06-25 | Stop reason: HOSPADM

## 2019-06-23 RX ADMIN — LAMOTRIGINE 25 MG: 25 TABLET ORAL at 09:06

## 2019-06-23 RX ADMIN — THERA TABS 1 TABLET: TAB at 12:06

## 2019-06-23 RX ADMIN — LORAZEPAM 2 MG: 2 INJECTION INTRAMUSCULAR; INTRAVENOUS at 12:06

## 2019-06-23 RX ADMIN — DEXTROSE 2000 MG: 50 INJECTION, SOLUTION INTRAVENOUS at 05:06

## 2019-06-23 RX ADMIN — Medication 100 MG: at 09:06

## 2019-06-23 RX ADMIN — LAMOTRIGINE 150 MG: 100 TABLET ORAL at 12:06

## 2019-06-23 RX ADMIN — LORAZEPAM 2 MG: 2 INJECTION INTRAMUSCULAR; INTRAVENOUS at 04:06

## 2019-06-23 RX ADMIN — LAMOTRIGINE 150 MG: 100 TABLET ORAL at 08:06

## 2019-06-23 RX ADMIN — FOLIC ACID 1 MG: 1 TABLET ORAL at 09:06

## 2019-06-23 NOTE — ED NOTES
[facility] hospitalist np called and notified of patients seizure episode that occurred approximately x 1 hour ago. VSS at this time. Patient is sleeping but wakes to verbal/tactile stimuli. Bethany Travis NP, states patient is still cleared for the floor and will order tele-sitter for patient.
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
## 2019-06-23 NOTE — ED NOTES
Patients IV infiltrated while given 1 mg of Ativan per verbal order from Dr. Hernandez . IV taken out. Dr. Hernandez said okay for patient to receive another mg of Ativan after putting in another IV.
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
## 2019-06-23 NOTE — ASSESSMENT & PLAN NOTE
Patient will require ongoing education/counseling on cessation. Reports 30+ years hx of alcohol abuse daily.
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
## 2019-06-23 NOTE — ED NOTES
Pt. Linens changed and placed in paper scrubs. Pt. Is awake and alert. Able to state his name, date of birth and location. Bed in the low position, side rails elevated x 2 and will continue to monitor.
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
## 2019-06-23 NOTE — ED NOTES
Pt. In bed kneeling in bed on all four extremities moaning. Pt. Having a seizure. Pt.s airway protected and placed on 2 LNC and mouth suctioned. Dr. Hernandez aware and at the bedside.  Skin is diaphoretic and bed in the low position with side rails elevated.

## 2019-06-23 NOTE — ED NOTES
Pt. Returned to ER bed 3 after CT scan. Pt. Is awake and more alert. Able to state that he is in a hospital. Bed in the low position, side rails elevated x 2 and pt. In a visible room near nurses station. Cardiac monitor shows NSR with HR-90, skin is PWD.

## 2019-06-23 NOTE — ED NOTES
Pt. Had another seizure, snoring respirations heard. Pt.s airway protected. Mild seizure lasted a few seconds. Dr. Mary coles.

## 2019-06-23 NOTE — SUBJECTIVE & OBJECTIVE
"Past Medical History:   Diagnosis Date    Ex-cigarette smoker     9 years ago    Seizures        History reviewed. No pertinent surgical history.    Review of patient's allergies indicates:  No Known Allergies    No current facility-administered medications on file prior to encounter.      Current Outpatient Medications on File Prior to Encounter   Medication Sig    folic acid (FOLVITE) 1 MG tablet Take 1 tablet (1 mg total) by mouth once daily.    multivitamin (THERAGRAN) tablet Take 1 tablet by mouth once daily.    thiamine 100 MG tablet Take 1 tablet (100 mg total) by mouth once daily.    cyanocobalamin (VITAMIN B-12) 250 MCG tablet Take 1 tablet (250 mcg total) by mouth once daily.    senna-docusate 8.6-50 mg (PERICOLACE) 8.6-50 mg per tablet Take 2 tablets by mouth once daily.     Family History     Problem Relation (Age of Onset)    No Known Problems Mother, Father        Tobacco Use    Smoking status: Former Smoker     Types: Cigarettes    Smokeless tobacco: Never Used    Tobacco comment: 9 years ago   Substance and Sexual Activity    Alcohol use: Yes     Comment: Former abuser, last drink in Feb    Drug use: No     Types: "Crack" cocaine, Cocaine, MDMA (Ecstacy), Marijuana     Comment: Former user    Sexual activity: Yes     Partners: Female     Review of Systems   Unable to perform ROS: Acuity of condition     Objective:     Vital Signs (Most Recent):  Temp: 98.3 °F (36.8 °C) (06/23/19 0007)  Pulse: 74 (06/23/19 0019)  Resp: 18 (06/23/19 0007)  BP: 114/78 (06/23/19 0019)  SpO2: 95 % (06/23/19 0019) Vital Signs (24h Range):  Temp:  [98.3 °F (36.8 °C)-98.9 °F (37.2 °C)] 98.3 °F (36.8 °C)  Pulse:  [71-98] 74  Resp:  [18-23] 18  SpO2:  [95 %-97 %] 95 %  BP: (107-122)/(57-81) 114/78     Weight: 74.8 kg (165 lb)  Body mass index is 23.68 kg/m².    Physical Exam   Constitutional: He appears well-developed. He appears distressed.   HENT:   Head: Normocephalic.   Eyes: Pupils are equal, round, and " reactive to light. EOM are normal.   Neck: Normal range of motion. No JVD present.   Cardiovascular: Normal rate and regular rhythm.   Pulmonary/Chest: Effort normal and breath sounds normal.   Abdominal: Soft. Bowel sounds are normal. He exhibits no distension.   Musculoskeletal: Normal range of motion.   Neurological: He is alert.   Intermittent confusion   Skin: Skin is warm and dry. Capillary refill takes less than 2 seconds.   Psychiatric: He has a normal mood and affect.         CRANIAL NERVES     CN III, IV, VI   Pupils are equal, round, and reactive to light.  Extraocular motions are normal.        Significant Labs:   BMP:   Recent Labs   Lab 06/22/19 2055         K 3.5      CO2 15*   BUN 35*   CREATININE 1.0   CALCIUM 9.9   MG 2.1     CBC:   Recent Labs   Lab 06/22/19 2055   WBC 11.53   HGB 13.2*   HCT 41.2        CMP:   Recent Labs   Lab 06/22/19 2055      K 3.5      CO2 15*      BUN 35*   CREATININE 1.0   CALCIUM 9.9   PROT 7.4   ALBUMIN 4.1   BILITOT 0.7   ALKPHOS 97   AST 20   ALT 37   ANIONGAP 24*   EGFRNONAA >60     Magnesium:   Recent Labs   Lab 06/22/19 2055   MG 2.1     All pertinent labs within the past 24 hours have been reviewed.    Significant Imaging: I have reviewed all pertinent imaging results/findings within the past 24 hours.

## 2019-06-23 NOTE — ED NOTES
[facility] NP Joy called and asked to review/modify ativan order for seizure patient. Current order is for 2 mg of ativan every 6 hours. Whitney states she will change the orders.
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
## 2019-06-23 NOTE — ED NOTES
Floor called for reports. Charge nurse states patient is being assigned a nurse. Will be able to call report shortly.

## 2019-06-23 NOTE — PROGRESS NOTES
Pt arrived to ICU from floor. Placed on ICU monitors. Pt sitting up eating dinner tray. AAOx4. Call light in reach and bed in low position. Pt denies pain. WCTM.

## 2019-06-23 NOTE — ED NOTES
Pt. To CT scan via stretcher and RN and radiology tech. Respirations are even and non labored. Skin is PWD. New linens applied to bed. Bed in the low position and side rails elevated.

## 2019-06-23 NOTE — ASSESSMENT & PLAN NOTE
Patient will require ongoing education/counseling on cessation, has 30 year hx of crack cocaine use.

## 2019-06-23 NOTE — ED NOTES
Pt. Transported to CT scan via radiology tech and nurse. Pt. Is post dictal at this time. Respirations are even and non labored.
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
## 2019-06-24 LAB
ANION GAP SERPL CALC-SCNC: 11 MMOL/L (ref 8–16)
BUN SERPL-MCNC: 15 MG/DL (ref 6–20)
CALCIUM SERPL-MCNC: 9.3 MG/DL (ref 8.7–10.5)
CHLORIDE SERPL-SCNC: 103 MMOL/L (ref 95–110)
CO2 SERPL-SCNC: 25 MMOL/L (ref 23–29)
CREAT SERPL-MCNC: 0.8 MG/DL (ref 0.5–1.4)
ERYTHROCYTE [DISTWIDTH] IN BLOOD BY AUTOMATED COUNT: 12.5 % (ref 11.5–14.5)
EST. GFR  (AFRICAN AMERICAN): >60 ML/MIN/1.73 M^2
EST. GFR  (NON AFRICAN AMERICAN): >60 ML/MIN/1.73 M^2
GLUCOSE SERPL-MCNC: 80 MG/DL (ref 70–110)
HCT VFR BLD AUTO: 37.5 % (ref 40–54)
HGB BLD-MCNC: 12.6 G/DL (ref 14–18)
MAGNESIUM SERPL-MCNC: 2 MG/DL (ref 1.6–2.6)
MCH RBC QN AUTO: 30.5 PG (ref 27–31)
MCHC RBC AUTO-ENTMCNC: 33.6 G/DL (ref 32–36)
MCV RBC AUTO: 91 FL (ref 82–98)
PHOSPHATE SERPL-MCNC: 3.1 MG/DL (ref 2.7–4.5)
PLATELET # BLD AUTO: 204 K/UL (ref 150–350)
PMV BLD AUTO: 10.9 FL (ref 9.2–12.9)
POTASSIUM SERPL-SCNC: 3.6 MMOL/L (ref 3.5–5.1)
RBC # BLD AUTO: 4.13 M/UL (ref 4.6–6.2)
SODIUM SERPL-SCNC: 139 MMOL/L (ref 136–145)
WBC # BLD AUTO: 7.67 K/UL (ref 3.9–12.7)

## 2019-06-24 PROCEDURE — 99233 PR SUBSEQUENT HOSPITAL CARE,LEVL III: ICD-10-PCS | Mod: ,,, | Performed by: PSYCHIATRY & NEUROLOGY

## 2019-06-24 PROCEDURE — 99223 PR INITIAL HOSPITAL CARE,LEVL III: ICD-10-PCS | Mod: ,,, | Performed by: INTERNAL MEDICINE

## 2019-06-24 PROCEDURE — 80339 ANTIEPILEPTICS NOS 1-3: CPT

## 2019-06-24 PROCEDURE — 25000003 PHARM REV CODE 250: Performed by: STUDENT IN AN ORGANIZED HEALTH CARE EDUCATION/TRAINING PROGRAM

## 2019-06-24 PROCEDURE — 63600175 PHARM REV CODE 636 W HCPCS: Performed by: STUDENT IN AN ORGANIZED HEALTH CARE EDUCATION/TRAINING PROGRAM

## 2019-06-24 PROCEDURE — 87040 BLOOD CULTURE FOR BACTERIA: CPT

## 2019-06-24 PROCEDURE — 83735 ASSAY OF MAGNESIUM: CPT

## 2019-06-24 PROCEDURE — 95951 PR EEG MONITORING/VIDEORECORD: CPT | Mod: 26,,, | Performed by: PSYCHIATRY & NEUROLOGY

## 2019-06-24 PROCEDURE — 80048 BASIC METABOLIC PNL TOTAL CA: CPT

## 2019-06-24 PROCEDURE — 99233 SBSQ HOSP IP/OBS HIGH 50: CPT | Mod: ,,, | Performed by: PSYCHIATRY & NEUROLOGY

## 2019-06-24 PROCEDURE — 84100 ASSAY OF PHOSPHORUS: CPT

## 2019-06-24 PROCEDURE — 85027 COMPLETE CBC AUTOMATED: CPT

## 2019-06-24 PROCEDURE — 95951 PR EEG MONITORING/VIDEORECORD: ICD-10-PCS | Mod: 26,,, | Performed by: PSYCHIATRY & NEUROLOGY

## 2019-06-24 PROCEDURE — 99223 1ST HOSP IP/OBS HIGH 75: CPT | Mod: ,,, | Performed by: INTERNAL MEDICINE

## 2019-06-24 PROCEDURE — 36415 COLL VENOUS BLD VENIPUNCTURE: CPT

## 2019-06-24 PROCEDURE — 20600001 HC STEP DOWN PRIVATE ROOM

## 2019-06-24 PROCEDURE — 95951 HC EEG MONITORING/VIDEO RECORD: CPT

## 2019-06-24 RX ORDER — THIAMINE HCL 100 MG
100 TABLET ORAL DAILY
Status: DISCONTINUED | OUTPATIENT
Start: 2019-06-24 | End: 2019-06-24

## 2019-06-24 RX ORDER — LEVETIRACETAM 500 MG/1
1250 TABLET ORAL 2 TIMES DAILY
Status: ON HOLD | COMMUNITY
End: 2019-06-25 | Stop reason: HOSPADM

## 2019-06-24 RX ORDER — LAMOTRIGINE 150 MG/1
150 TABLET ORAL 2 TIMES DAILY
Status: DISCONTINUED | OUTPATIENT
Start: 2019-06-24 | End: 2019-06-25 | Stop reason: HOSPADM

## 2019-06-24 RX ORDER — LAMOTRIGINE 150 MG/1
150 TABLET ORAL 2 TIMES DAILY
Status: DISCONTINUED | OUTPATIENT
Start: 2019-06-24 | End: 2019-06-24

## 2019-06-24 RX ORDER — LORAZEPAM 1 MG/1
2 TABLET ORAL EVERY 4 HOURS PRN
Status: DISCONTINUED | OUTPATIENT
Start: 2019-06-24 | End: 2019-06-25 | Stop reason: HOSPADM

## 2019-06-24 RX ORDER — ENOXAPARIN SODIUM 100 MG/ML
40 INJECTION SUBCUTANEOUS EVERY 24 HOURS
Status: DISCONTINUED | OUTPATIENT
Start: 2019-06-24 | End: 2019-06-25 | Stop reason: HOSPADM

## 2019-06-24 RX ORDER — FOLIC ACID 1 MG/1
1 TABLET ORAL DAILY
Status: DISCONTINUED | OUTPATIENT
Start: 2019-06-24 | End: 2019-06-25 | Stop reason: HOSPADM

## 2019-06-24 RX ORDER — CYANOCOBALAMIN (VITAMIN B-12) 250 MCG
250 TABLET ORAL DAILY
Status: DISCONTINUED | OUTPATIENT
Start: 2019-06-24 | End: 2019-06-25 | Stop reason: HOSPADM

## 2019-06-24 RX ADMIN — LEVETIRACETAM 1000 MG: 500 TABLET ORAL at 09:06

## 2019-06-24 RX ADMIN — LAMOTRIGINE 150 MG: 150 TABLET ORAL at 09:06

## 2019-06-24 RX ADMIN — ENOXAPARIN SODIUM 40 MG: 40 INJECTION SUBCUTANEOUS at 06:06

## 2019-06-24 RX ADMIN — THERA TABS 1 TABLET: TAB at 09:06

## 2019-06-24 RX ADMIN — LAMOTRIGINE 150 MG: 150 TABLET ORAL at 12:06

## 2019-06-24 RX ADMIN — Medication 100 MG: at 09:06

## 2019-06-24 RX ADMIN — CYANOCOBALAMIN TAB 250 MCG 250 MCG: 250 TAB at 09:06

## 2019-06-24 RX ADMIN — FOLIC ACID 1 MG: 1 TABLET ORAL at 09:06

## 2019-06-24 NOTE — PROGRESS NOTES
2011 - Agueda from transfer center called to state the pt has a bed assignment at [facility] in room 745. Number for report is [phone]. She states she will arrange for transport of the pt.    2028 - Called report to BG Daniels, at the neuro ICU at [facility].    2100 - Pt up at the bedside to urinate.     2130 - EMS here to transfer pt to [facility]. Pt is awake, alert, and oriented, answering questions appropriately at this time.
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
## 2019-06-24 NOTE — SUBJECTIVE & OBJECTIVE
"Past Medical History:   Diagnosis Date    Ex-cigarette smoker     9 years ago    Seizures      History reviewed. No pertinent surgical history.    Review of patient's allergies indicates:  No Known Allergies    Current Facility-Administered Medications on File Prior to Encounter   Medication    [COMPLETED] levETIRAcetam (KEPPRA) 2,000 mg in dextrose 5 % 150 mL IVPB    [DISCONTINUED] acetaminophen tablet 650 mg    [DISCONTINUED] folic acid tablet 1 mg    [DISCONTINUED] lamoTRIgine tablet 150 mg    [DISCONTINUED] lorazepam (ATIVAN) injection 2 mg    [DISCONTINUED] multivitamin tablet 1 tablet    [DISCONTINUED] ondansetron injection 4 mg    [DISCONTINUED] ramelteon tablet 8 mg    [DISCONTINUED] sodium chloride 0.9% flush 10 mL    [DISCONTINUED] thiamine tablet 100 mg     Current Outpatient Medications on File Prior to Encounter   Medication Sig    cloBAZam (ONFI) 20 mg Tab take 1 tablet by mouth twice daily    cyanocobalamin (VITAMIN B-12) 250 MCG tablet Take 1 tablet (250 mcg total) by mouth once daily.    folic acid (FOLVITE) 1 MG tablet Take 1 tablet (1 mg total) by mouth once daily.    lamoTRIgine (LAMICTAL) 150 MG Tab Take 1 tablet (150 mg total) by mouth 2 (two) times daily.    multivitamin (THERAGRAN) tablet Take 1 tablet by mouth once daily.    senna-docusate 8.6-50 mg (PERICOLACE) 8.6-50 mg per tablet Take 2 tablets by mouth once daily.    thiamine 100 MG tablet Take 1 tablet (100 mg total) by mouth once daily.     Family History     Problem Relation (Age of Onset)    No Known Problems Mother, Father        Tobacco Use    Smoking status: Former Smoker     Types: Cigarettes    Smokeless tobacco: Never Used    Tobacco comment: 9 years ago   Substance and Sexual Activity    Alcohol use: Yes     Comment: Former abuser, last drink in Feb    Drug use: No     Types: "Crack" cocaine, Cocaine, MDMA (Ecstacy), Marijuana     Comment: Former user    Sexual activity: Yes     Partners: Female "     Review of Systems   Constitutional: Positive for activity change and fatigue.   HENT: Negative for trouble swallowing and voice change.    Respiratory: Negative for shortness of breath.    Gastrointestinal: Negative for nausea and vomiting.   Neurological: Positive for seizures.   Psychiatric/Behavioral: Positive for confusion and decreased concentration.     Objective:     Vital Signs (Most Recent):  Temp: 97.1 °F (36.2 °C) (06/24/19 1123)  Pulse: 71 (06/24/19 1123)  Resp: 18 (06/24/19 1123)  BP: 104/70 (06/24/19 1123)  SpO2: 97 % (06/24/19 1123) Vital Signs (24h Range):  Temp:  [96.5 °F (35.8 °C)-98.5 °F (36.9 °C)] 97.1 °F (36.2 °C)  Pulse:  [68-84] 71  Resp:  [14-30] 18  SpO2:  [92 %-97 %] 97 %  BP: ()/(50-72) 104/70     Weight: 71.3 kg (157 lb 3 oz)  Body mass index is 22.55 kg/m².    Physical Exam   Eyes: Pupils are equal, round, and reactive to light. EOM are normal.   Neurological: He has a normal Finger-Nose-Finger Test.   Reflex Scores:       Bicep reflexes are 2+ on the right side and 2+ on the left side.       Brachioradialis reflexes are 2+ on the right side and 2+ on the left side.       Patellar reflexes are 2+ on the right side and 2+ on the left side.  Psychiatric: His speech is normal.     NEUROLOGICAL EXAMINATION:     MENTAL STATUS   Oriented to person.   Oriented to place. Oriented to city.   Oriented to year, month and day.   Follows 2 step commands.   Speech: speech is normal   Level of consciousness: alert  Normal comprehension.        Makes lots of references to [place] telling him to stop taking meds and being healed      CRANIAL NERVES     CN III, IV, VI   Pupils are equal, round, and reactive to light.  Extraocular motions are normal.   Nystagmus: none   Diplopia: none  Ophthalmoparesis: none    CN V   Facial sensation intact.     CN VII   Facial expression full, symmetric.     CN VIII   Hearing: intact    CN XI   CN XI normal.     MOTOR EXAM   Muscle bulk: normal  Overall muscle  tone: normal  Right arm pronator drift: absent  Left arm pronator drift: absent    Strength   Right deltoid: 5/5  Left deltoid: 5/5  Right biceps: 5/5  Left biceps: 5/5  Right interossei: 5/5  Left interossei: 5/5  Right anterior tibial: 5/5  Left anterior tibial: 5/5  Right posterior tibial: 5/5  Left posterior tibial: 5/5    REFLEXES     Reflexes   Right brachioradialis: 2+  Left brachioradialis: 2+  Right biceps: 2+  Left biceps: 2+  Right patellar: 2+  Left patellar: 2+    SENSORY EXAM   Light touch normal.     GAIT AND COORDINATION      Coordination   Finger to nose coordination: normal    Tremor   Resting tremor: absent    Significant Labs:   Hemoglobin A1c: No results for input(s): HGBA1C in the last 720 hours.  Blood Culture: No results for input(s): LABBLOO in the last 48 hours.  CBC:   Recent Labs   Lab 06/22/19 2055 06/23/19  0532 06/24/19  0516   WBC 11.53 9.15 7.67   HGB 13.2* 13.0* 12.6*   HCT 41.2 39.1* 37.5*    191 204     CMP:   Recent Labs   Lab 06/22/19 2055 06/23/19 0532 06/23/19  0533 06/24/19  0516    91  --  80    136  --  139   K 3.5 3.9  --  3.6    102  --  103   CO2 15* 26  --  25   BUN 35* 24*  --  15   CREATININE 1.0 0.7  --  0.8   CALCIUM 9.9 9.3  --  9.3   MG 2.1  --  2.0 2.0   PROT 7.4  --  6.8  --    ALBUMIN 4.1  --  4.0  --    BILITOT 0.7  --  0.8  --    ALKPHOS 97  --  90  --    AST 20  --  19  --    ALT 37  --  32  --    ANIONGAP 24* 8  --  11   EGFRNONAA >60 >60  --  >60.0     Inflammatory Markers: No results for input(s): SEDRATE, CRP, PROCAL in the last 48 hours.  Urine Culture: No results for input(s): LABURIN in the last 48 hours.  Urine Studies: No results for input(s): COLORU, APPEARANCEUA, PHUR, SPECGRAV, PROTEINUA, GLUCUA, KETONESU, BILIRUBINUA, OCCULTUA, NITRITE, UROBILINOGEN, LEUKOCYTESUR, RBCUA, WBCUA, BACTERIA, SQUAMEPITHEL, HYALINECASTS in the last 48 hours.    Invalid input(s): WRIGHTSUR     Lamictal and Clobazam levels pending    All  pertinent lab results from the past 24 hours have been reviewed.    Significant Imaging: I have reviewed and interpreted all pertinent imaging results/findings within the past 24 hours.     CT Head WO contrast (6/22/19):  No detrimental change or acute intracranial abnormality definitively seen.  Specifically, no intracranial hemorrhage.
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
## 2019-06-24 NOTE — SUBJECTIVE & OBJECTIVE
Past Medical History:   Diagnosis Date    Ex-cigarette smoker     9 years ago    Seizures        No past surgical history on file.    Review of patient's allergies indicates:  No Known Allergies    Current Facility-Administered Medications on File Prior to Encounter   Medication    [COMPLETED] lamoTRIgine tablet 150 mg    [COMPLETED] levETIRAcetam (KEPPRA) 2,000 mg in dextrose 5 % 150 mL IVPB    [DISCONTINUED] acetaminophen tablet 650 mg    [DISCONTINUED] folic acid tablet 1 mg    [DISCONTINUED] lamoTRIgine tablet 150 mg    [DISCONTINUED] lamoTRIgine tablet 25 mg    [DISCONTINUED] lorazepam (ATIVAN) injection 2 mg    [DISCONTINUED] lorazepam (ATIVAN) injection 2 mg    [DISCONTINUED] lorazepam (ATIVAN) injection 2 mg    [DISCONTINUED] lorazepam (ATIVAN) injection 2 mg    [DISCONTINUED] multivitamin tablet 1 tablet    [DISCONTINUED] ondansetron injection 4 mg    [DISCONTINUED] ramelteon tablet 8 mg    [DISCONTINUED] sodium chloride 0.9% flush 10 mL    [DISCONTINUED] thiamine tablet 100 mg     Current Outpatient Medications on File Prior to Encounter   Medication Sig    cloBAZam (ONFI) 20 mg Tab take 1 tablet by mouth twice daily    cyanocobalamin (VITAMIN B-12) 250 MCG tablet Take 1 tablet (250 mcg total) by mouth once daily.    folic acid (FOLVITE) 1 MG tablet Take 1 tablet (1 mg total) by mouth once daily.    lamoTRIgine (LAMICTAL) 150 MG Tab Take 1 tablet (150 mg total) by mouth 2 (two) times daily.    multivitamin (THERAGRAN) tablet Take 1 tablet by mouth once daily.    senna-docusate 8.6-50 mg (PERICOLACE) 8.6-50 mg per tablet Take 2 tablets by mouth once daily.    thiamine 100 MG tablet Take 1 tablet (100 mg total) by mouth once daily.     Family History     Problem Relation (Age of Onset)    No Known Problems Mother, Father        Tobacco Use    Smoking status: Former Smoker     Types: Cigarettes    Smokeless tobacco: Never Used    Tobacco comment: 9 years ago   Substance and Sexual  "Activity    Alcohol use: Yes     Comment: Former abuser, last drink in Feb    Drug use: No     Types: "Crack" cocaine, Cocaine, MDMA (Ecstacy), Marijuana     Comment: Former user    Sexual activity: Yes     Partners: Female     Review of Systems   Constitutional: Negative for chills and fever.   HENT: Negative for congestion and trouble swallowing.    Eyes: Negative for pain and visual disturbance.   Respiratory: Negative for cough and shortness of breath.    Cardiovascular: Negative for chest pain and palpitations.   Gastrointestinal: Negative for abdominal pain, constipation, diarrhea, nausea and vomiting.   Genitourinary: Negative for dysuria and flank pain.   Musculoskeletal: Negative for myalgias.   Neurological: Negative for weakness, numbness and headaches.   Psychiatric/Behavioral: The patient is not nervous/anxious.      Objective:     Vital Signs (Most Recent):  Temp: 97.5 °F (36.4 °C) (06/23/19 2200)  Pulse: 73 (06/23/19 2200)  Resp: 18 (06/23/19 2200)  BP: 108/70 (06/23/19 2200)  SpO2: (!) 94 % (06/23/19 2200) Vital Signs (24h Range):  Temp:  [96.1 °F (35.6 °C)-98.5 °F (36.9 °C)] 97.5 °F (36.4 °C)  Pulse:  [60-84] 73  Resp:  [14-30] 18  SpO2:  [93 %-95 %] 94 %  BP: ()/(51-78) 108/70        There is no height or weight on file to calculate BMI.    Physical Exam   Constitutional: He is oriented to person, place, and time. He appears well-developed and well-nourished. No distress.   HENT:   Head: Normocephalic and atraumatic.   Nose: Nose normal.   Eyes: Conjunctivae and EOM are normal. Right eye exhibits no discharge. Left eye exhibits no discharge.   Neck: Neck supple.   Cardiovascular: Normal rate, regular rhythm and normal heart sounds. Exam reveals no gallop and no friction rub.   No murmur heard.  Pulmonary/Chest: Effort normal and breath sounds normal. No respiratory distress. He has no wheezes. He has no rales.   Abdominal: Soft. Bowel sounds are normal. There is no tenderness. There is no " rebound and no guarding.   Musculoskeletal: He exhibits no edema or tenderness.   Lymphadenopathy:     He has no cervical adenopathy.   Neurological: He is alert and oriented to person, place, and time.   Skin: Skin is warm and dry. He is not diaphoretic.   Psychiatric: He has a normal mood and affect. His behavior is normal. Thought content normal.   Nursing note and vitals reviewed.        CRANIAL NERVES     CN III, IV, VI   Extraocular motions are normal.        Significant Labs:   BMP:   Recent Labs   Lab 06/23/19  0532 06/23/19  0533   GLU 91  --      --    K 3.9  --      --    CO2 26  --    BUN 24*  --    CREATININE 0.7  --    CALCIUM 9.3  --    MG  --  2.0     CBC:   Recent Labs   Lab 06/22/19 2055 06/23/19  0532   WBC 11.53 9.15   HGB 13.2* 13.0*   HCT 41.2 39.1*    191

## 2019-06-24 NOTE — NURSING TRANSFER
Nursing Transfer Note      6/23/2019     Transfer To: [facility]    Transfer via stretcher    Transfer with belongings    Transported by EMS    Chart send with patient: Yes

## 2019-06-24 NOTE — ASSESSMENT & PLAN NOTE
Pt reports he stopped taking lamictal 5-8 days ago because Anders told him to. Prior to this self discontinuation, says he had 3-4 seizures monthly on Lamictal 150 mg BID. Rx last filled at [facility] in [place] on 5/21.     --encouraged adherence to medication regimen

## 2019-06-24 NOTE — NURSING
At 2200, patient arrived to [facility] via stretcher per EMT x2 in stable condition. AAOx4. Respirations even and unlabored. No s/s of distress noted. Able to verbalize needs to staff. Denies any pain at this time. Oriented patient to room and was able to correctly demonstrate the use of the call bell. Bed locked in lowest position. Call bell within reach. Will continue to monitor.

## 2019-06-24 NOTE — PLAN OF CARE
Problem: Adult Inpatient Plan of Care  Goal: Plan of Care Review  Outcome: Ongoing (interventions implemented as appropriate)  POC reviewed with patient. All questions and concerns reviewed. VSS throughout shift. Fall/safety/seizure precautions implemented and maintained. No events noted this shift at this time. Bed locked in lowest position. Call bell within reach. Will continue to monitor.

## 2019-06-24 NOTE — ASSESSMENT & PLAN NOTE
Patient denies recent alcohol use  Ethanol level at OSH was negative  Not tremulous on exam, no tachycardia on vitals  Appears unlikely that patient is in withdraw; however, given history, will order CIWA score and will address as necessary    Plan:  -CIWA protocol placed with instructions to call primary team if CIWA score < 8
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
## 2019-06-24 NOTE — PLAN OF CARE
"CM met with patient to complete the discharge planning assessment. Patient was given the [facility] " My Health Packet" and CM placed name and phone number on the whiteboard. Mr. Yusuf informed this CM that when he is discharged he will be returning to his tent which is located near the [employer]. He stated that he does not speak to his family and has no friends to assist after discharge. Patient is a poor historian and does not remember the last time he saw a doctor, stated he used to go to [facility] . He did tell this CM that he stopped taking his medications about 6 weeks ago because " Anders told him to stop."      Payor: MEDICAID / Plan: [place] [employer] / Product Type: Managed Medicaid /         06/24/19 1512   Discharge Assessment   Assessment Type Discharge Planning Assessment   Confirmed/corrected address and phone number on facesheet? Yes   Assessment information obtained from? Patient   Expected Length of Stay (days) 3   Communicated expected length of stay with patient/caregiver yes   Prior to hospitilization cognitive status: Alert/Oriented  (Pt is a poor historian.)   Prior to hospitalization functional status: Independent   Current cognitive status: Alert/Oriented   Current Functional Status: Independent   Facility Arrived From: Transferred from [facility].   Lives With alone   Able to Return to Prior Arrangements yes   Patient's perception of discharge disposition other (comments)  (Pt states he will return to his tent.)   Readmission Within the Last 30 Days no previous admission in last 30 days   Patient currently being followed by outpatient case management? No   Patient currently receives any other outside agency services? No   Equipment Currently Used at Home none   Is the patient taking medications as prescribed? no   If no, which medications is patient not taking? seizure medications    Does the patient receive services at the [facility]? No   Discharge Plan A " Home   DME Needed Upon Discharge  none   Patient/Family in Agreement with Plan yes

## 2019-06-24 NOTE — ASSESSMENT & PLAN NOTE
Patient presenting to OSH for seizures after reported noncompliance  Labs unremarkable, no leukocytosis and ethanol negative  CT head negative for acute bleed  Patient admitted to [facility] but noted to have possible complex seizure activity  Case discussed with NCC and neurology, recommending transfer to [facility] for 24hr EEG monitoring  Received keppra loading with 2g and transferred to [facility]    Plan:  -continue lamotrigine 150mg BID  -continue keppra 1000mg BID  -EEG 24hr monitoring ordered  -valium 5mg PRN for seizure activity  -Neurology consulted, appreciate recs
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
## 2019-06-24 NOTE — CONSULTS
"[facility]  Neurology  Consult Note    Patient Name: Terrell Yusuf  MRN: [id number]  Admission Date: 6/23/2019  Hospital Length of Stay: 1 days  Code Status: Full Code   Attending Provider: Tonya Carbone MD   Consulting Provider: Terri Contreras PA-C  Primary Care Physician: GAVINO Alvarez  Principal Problem:Seizures    Inpatient consult to Neurology  Consult performed by: Terri Contreras PA-C  Consult ordered by: Jocelyn Maier MD         Subjective:     Chief Complaint:  Seizures      HPI:   59 yo male with hx of epilepsy (40+ yr) was brought to [facility] via EMS on 6/22 after being found down near his bicycle. Pt says seizures occur out of the blue and he has no recollection of event. Says he has been having seizures since head injury from football in his teenage years. Has been on numerous AEDs over his life, but recently on Lamictal monotherapy. Home med list with Lamictal 150 mg BID. Per patient, he has 2-4 seizures a month, but had more events after self discontinuation of Lamical 5-8 days ago. Said he d/c meds because the "lord told him to." Gets Rx at [facility] in [place] and rides his bike to  medication. Per [facility] pharmacist, he last filled Lamictal on 5/21 and received 324 tabs. Levetiracetam 1250 mg BID was filled 5/11, but patient denies taking this. Per chart review, he was supposed to be on Onfi  20 mg BID, but medication never filled at [facility]. Pt says he is homeless and lives in a tent. This admission, he was loaded with 2 g of Keppra and placed on 1 g BID in addition to Lamictal 150 mg BID at [place]. Transferred to [facility] for extended EEG monitoring and epilepsy recs on AED regimen. AED levels pending.   Per chart review, from Dec 2018 admission, he was diagnosed with frontal and temporal lobe epilepsy. Rec was to d/c Keppra and follow lamictal titration as follows: (week 1) 25 mg qd, (week 2) 25 mg BID, (week 3) 50 mg q am + 25 mg qhs, (week 4) 50 " mg BID, (week 5) 75 mg BID, (week 6) 100 mg BID, (week 7) 150 mg BID. Onfi was supposed to be used as bridge until lamictal goal reached.      Past Medical History:   Diagnosis Date    Ex-cigarette smoker     9 years ago    Seizures      History reviewed. No pertinent surgical history.    Review of patient's allergies indicates:  No Known Allergies    Current Facility-Administered Medications on File Prior to Encounter   Medication    [COMPLETED] levETIRAcetam (KEPPRA) 2,000 mg in dextrose 5 % 150 mL IVPB    [DISCONTINUED] acetaminophen tablet 650 mg    [DISCONTINUED] folic acid tablet 1 mg    [DISCONTINUED] lamoTRIgine tablet 150 mg    [DISCONTINUED] lorazepam (ATIVAN) injection 2 mg    [DISCONTINUED] multivitamin tablet 1 tablet    [DISCONTINUED] ondansetron injection 4 mg    [DISCONTINUED] ramelteon tablet 8 mg    [DISCONTINUED] sodium chloride 0.9% flush 10 mL    [DISCONTINUED] thiamine tablet 100 mg     Current Outpatient Medications on File Prior to Encounter   Medication Sig    cloBAZam (ONFI) 20 mg Tab take 1 tablet by mouth twice daily    cyanocobalamin (VITAMIN B-12) 250 MCG tablet Take 1 tablet (250 mcg total) by mouth once daily.    folic acid (FOLVITE) 1 MG tablet Take 1 tablet (1 mg total) by mouth once daily.    lamoTRIgine (LAMICTAL) 150 MG Tab Take 1 tablet (150 mg total) by mouth 2 (two) times daily.    multivitamin (THERAGRAN) tablet Take 1 tablet by mouth once daily.    senna-docusate 8.6-50 mg (PERICOLACE) 8.6-50 mg per tablet Take 2 tablets by mouth once daily.    thiamine 100 MG tablet Take 1 tablet (100 mg total) by mouth once daily.     Family History     Problem Relation (Age of Onset)    No Known Problems Mother, Father        Tobacco Use    Smoking status: Former Smoker     Types: Cigarettes    Smokeless tobacco: Never Used    Tobacco comment: 9 years ago   Substance and Sexual Activity    Alcohol use: Yes     Comment: Former abuser, last drink in Feb    Drug use:  "No     Types: "Crack" cocaine, Cocaine, MDMA (Ecstacy), Marijuana     Comment: Former user    Sexual activity: Yes     Partners: Female     Review of Systems   Constitutional: Positive for activity change and fatigue.   HENT: Negative for trouble swallowing and voice change.    Respiratory: Negative for shortness of breath.    Gastrointestinal: Negative for nausea and vomiting.   Neurological: Positive for seizures.   Psychiatric/Behavioral: Positive for confusion and decreased concentration.     Objective:     Vital Signs (Most Recent):  Temp: 97.1 °F (36.2 °C) (06/24/19 1123)  Pulse: 71 (06/24/19 1123)  Resp: 18 (06/24/19 1123)  BP: 104/70 (06/24/19 1123)  SpO2: 97 % (06/24/19 1123) Vital Signs (24h Range):  Temp:  [96.5 °F (35.8 °C)-98.5 °F (36.9 °C)] 97.1 °F (36.2 °C)  Pulse:  [68-84] 71  Resp:  [14-30] 18  SpO2:  [92 %-97 %] 97 %  BP: ()/(50-72) 104/70     Weight: 71.3 kg (157 lb 3 oz)  Body mass index is 22.55 kg/m².    Physical Exam   Eyes: Pupils are equal, round, and reactive to light. EOM are normal.   Neurological: He has a normal Finger-Nose-Finger Test.   Reflex Scores:       Bicep reflexes are 2+ on the right side and 2+ on the left side.       Brachioradialis reflexes are 2+ on the right side and 2+ on the left side.       Patellar reflexes are 2+ on the right side and 2+ on the left side.  Psychiatric: His speech is normal.     NEUROLOGICAL EXAMINATION:     MENTAL STATUS   Oriented to person.   Oriented to place. Oriented to city.   Oriented to year, month and day.   Follows 2 step commands.   Speech: speech is normal   Level of consciousness: alert  Normal comprehension.        Makes lots of references to Anders telling him to stop taking meds and being healed      CRANIAL NERVES     CN III, IV, VI   Pupils are equal, round, and reactive to light.  Extraocular motions are normal.   Nystagmus: none   Diplopia: none  Ophthalmoparesis: none    CN V   Facial sensation intact.     CN VII   Facial " expression full, symmetric.     CN VIII   Hearing: intact    CN XI   CN XI normal.     MOTOR EXAM   Muscle bulk: normal  Overall muscle tone: normal  Right arm pronator drift: absent  Left arm pronator drift: absent    Strength   Right deltoid: 5/5  Left deltoid: 5/5  Right biceps: 5/5  Left biceps: 5/5  Right interossei: 5/5  Left interossei: 5/5  Right anterior tibial: 5/5  Left anterior tibial: 5/5  Right posterior tibial: 5/5  Left posterior tibial: 5/5    REFLEXES     Reflexes   Right brachioradialis: 2+  Left brachioradialis: 2+  Right biceps: 2+  Left biceps: 2+  Right patellar: 2+  Left patellar: 2+    SENSORY EXAM   Light touch normal.     GAIT AND COORDINATION      Coordination   Finger to nose coordination: normal    Tremor   Resting tremor: absent    Significant Labs:   Hemoglobin A1c: No results for input(s): HGBA1C in the last 720 hours.  Blood Culture: No results for input(s): LABBLOO in the last 48 hours.  CBC:   Recent Labs   Lab 06/22/19 2055 06/23/19 0532 06/24/19  0516   WBC 11.53 9.15 7.67   HGB 13.2* 13.0* 12.6*   HCT 41.2 39.1* 37.5*    191 204     CMP:   Recent Labs   Lab 06/22/19 2055 06/23/19 0532 06/23/19 0533 06/24/19  0516    91  --  80    136  --  139   K 3.5 3.9  --  3.6    102  --  103   CO2 15* 26  --  25   BUN 35* 24*  --  15   CREATININE 1.0 0.7  --  0.8   CALCIUM 9.9 9.3  --  9.3   MG 2.1  --  2.0 2.0   PROT 7.4  --  6.8  --    ALBUMIN 4.1  --  4.0  --    BILITOT 0.7  --  0.8  --    ALKPHOS 97  --  90  --    AST 20  --  19  --    ALT 37  --  32  --    ANIONGAP 24* 8  --  11   EGFRNONAA >60 >60  --  >60.0     Inflammatory Markers: No results for input(s): SEDRATE, CRP, PROCAL in the last 48 hours.  Urine Culture: No results for input(s): LABURIN in the last 48 hours.  Urine Studies: No results for input(s): COLORU, APPEARANCEUA, PHUR, SPECGRAV, PROTEINUA, GLUCUA, KETONESU, BILIRUBINUA, OCCULTUA, NITRITE, UROBILINOGEN, LEUKOCYTESUR, RBCUA, WBCUA,  BACTERIA, SQUAMEPITHEL, HYALINECASTS in the last 48 hours.    Invalid input(s): WRIGHTSUR     Lamictal and Clobazam levels pending    All pertinent lab results from the past 24 hours have been reviewed.    Significant Imaging: I have reviewed and interpreted all pertinent imaging results/findings within the past 24 hours.     CT Head WO contrast (6/22/19):  No detrimental change or acute intracranial abnormality definitively seen.  Specifically, no intracranial hemorrhage.    Assessment and Plan:     * Seizures  Hx of epilepsy for 40+ years after head trauma from football   Transferred from [place] after being brought in by EMS following seizure while riding bike  Filled Rx for Keppra and Lamical May 2019, but says he has only been taking Lamictal 150 mg BID, which he stopped 5-8 days ago  Last note from epilepsy service 12/2018 documented frontal and temporal epilepsy with plan for lamictal titration and onfi as bridge    Recommendations:  --lamictal level in process, continue 150 mg BID for now     Pending result, may increase to 200 mg BID  --d/c Keppra which can worsen frontal lobe epilepsy  --continue seizure precautions    Noncompliance with medications  Pt reports he stopped taking lamictal 5-8 days ago because Anders told him to. Prior to this self discontinuation, says he had 3-4 seizures monthly on Lamictal 150 mg BID. Rx last filled at [facility] in [place] on 5/21.      --encouraged adherence to medication regimen    VTE Risk Mitigation (From admission, onward)        Ordered     enoxaparin injection 40 mg  Daily      06/24/19 0814     IP VTE LOW RISK PATIENT  Once      06/23/19 2236     Place sequential compression device  Until discontinued      06/23/19 2236        Thank you for your consult. I will follow-up with patient. Please contact us if you have any additional questions.    Terri Contreras PA-C  General Neurology Consult  Neuro Consult Spectralink # [phone]

## 2019-06-25 VITALS
BODY MASS INDEX: 22.5 KG/M2 | HEART RATE: 66 BPM | TEMPERATURE: 98 F | RESPIRATION RATE: 18 BRPM | OXYGEN SATURATION: 92 % | HEIGHT: 70 IN | WEIGHT: 157.19 LBS | DIASTOLIC BLOOD PRESSURE: 61 MMHG | SYSTOLIC BLOOD PRESSURE: 106 MMHG

## 2019-06-25 PROBLEM — F10.11 HISTORY OF ALCOHOL ABUSE: Status: ACTIVE | Noted: 2018-11-26

## 2019-06-25 PROBLEM — G40.909 EPILEPSY: Status: ACTIVE | Noted: 2019-06-25

## 2019-06-25 LAB
ANION GAP SERPL CALC-SCNC: 10 MMOL/L (ref 8–16)
BASOPHILS # BLD AUTO: 0.05 K/UL (ref 0–0.2)
BASOPHILS NFR BLD: 0.7 % (ref 0–1.9)
BUN SERPL-MCNC: 16 MG/DL (ref 6–20)
CALCIUM SERPL-MCNC: 9.3 MG/DL (ref 8.7–10.5)
CHLORIDE SERPL-SCNC: 102 MMOL/L (ref 95–110)
CO2 SERPL-SCNC: 25 MMOL/L (ref 23–29)
CREAT SERPL-MCNC: 0.9 MG/DL (ref 0.5–1.4)
DIFFERENTIAL METHOD: ABNORMAL
EOSINOPHIL # BLD AUTO: 0.1 K/UL (ref 0–0.5)
EOSINOPHIL NFR BLD: 1 % (ref 0–8)
ERYTHROCYTE [DISTWIDTH] IN BLOOD BY AUTOMATED COUNT: 12.6 % (ref 11.5–14.5)
EST. GFR  (AFRICAN AMERICAN): >60 ML/MIN/1.73 M^2
EST. GFR  (NON AFRICAN AMERICAN): >60 ML/MIN/1.73 M^2
GLUCOSE SERPL-MCNC: 166 MG/DL (ref 70–110)
HCT VFR BLD AUTO: 39.7 % (ref 40–54)
HGB BLD-MCNC: 13.1 G/DL (ref 14–18)
IMM GRANULOCYTES # BLD AUTO: 0.01 K/UL (ref 0–0.04)
IMM GRANULOCYTES NFR BLD AUTO: 0.1 % (ref 0–0.5)
LAMOTRIGINE SERPL-MCNC: 2.2 UG/ML (ref 2–15)
LYMPHOCYTES # BLD AUTO: 1.3 K/UL (ref 1–4.8)
LYMPHOCYTES NFR BLD: 17.3 % (ref 18–48)
MCH RBC QN AUTO: 31 PG (ref 27–31)
MCHC RBC AUTO-ENTMCNC: 33 G/DL (ref 32–36)
MCV RBC AUTO: 94 FL (ref 82–98)
MONOCYTES # BLD AUTO: 0.7 K/UL (ref 0.3–1)
MONOCYTES NFR BLD: 9.8 % (ref 4–15)
NEUTROPHILS # BLD AUTO: 5.2 K/UL (ref 1.8–7.7)
NEUTROPHILS NFR BLD: 71.1 % (ref 38–73)
NRBC BLD-RTO: 0 /100 WBC
PLATELET # BLD AUTO: 200 K/UL (ref 150–350)
PMV BLD AUTO: 10.5 FL (ref 9.2–12.9)
POTASSIUM SERPL-SCNC: 3.7 MMOL/L (ref 3.5–5.1)
RBC # BLD AUTO: 4.22 M/UL (ref 4.6–6.2)
SODIUM SERPL-SCNC: 137 MMOL/L (ref 136–145)
WBC # BLD AUTO: 7.33 K/UL (ref 3.9–12.7)

## 2019-06-25 PROCEDURE — 25000003 PHARM REV CODE 250: Performed by: STUDENT IN AN ORGANIZED HEALTH CARE EDUCATION/TRAINING PROGRAM

## 2019-06-25 PROCEDURE — 99233 PR SUBSEQUENT HOSPITAL CARE,LEVL III: ICD-10-PCS | Mod: ,,, | Performed by: PSYCHIATRY & NEUROLOGY

## 2019-06-25 PROCEDURE — 99233 SBSQ HOSP IP/OBS HIGH 50: CPT | Mod: ,,, | Performed by: PSYCHIATRY & NEUROLOGY

## 2019-06-25 PROCEDURE — 85025 COMPLETE CBC W/AUTO DIFF WBC: CPT

## 2019-06-25 PROCEDURE — 36415 COLL VENOUS BLD VENIPUNCTURE: CPT

## 2019-06-25 PROCEDURE — 97165 OT EVAL LOW COMPLEX 30 MIN: CPT

## 2019-06-25 PROCEDURE — 99239 PR HOSPITAL DISCHARGE DAY,>30 MIN: ICD-10-PCS | Mod: ,,, | Performed by: INTERNAL MEDICINE

## 2019-06-25 PROCEDURE — 80048 BASIC METABOLIC PNL TOTAL CA: CPT

## 2019-06-25 PROCEDURE — 99239 HOSP IP/OBS DSCHRG MGMT >30: CPT | Mod: ,,, | Performed by: INTERNAL MEDICINE

## 2019-06-25 RX ORDER — FOLIC ACID 1 MG/1
1 TABLET ORAL DAILY
Qty: 30 TABLET | Refills: 2 | Status: SHIPPED | OUTPATIENT
Start: 2019-06-25 | End: 2020-08-10

## 2019-06-25 RX ORDER — LAMOTRIGINE 200 MG/1
200 TABLET ORAL 2 TIMES DAILY
Qty: 180 TABLET | Refills: 3 | Status: SHIPPED | OUTPATIENT
Start: 2019-06-25 | End: 2020-07-16 | Stop reason: SDUPTHER

## 2019-06-25 RX ADMIN — CYANOCOBALAMIN TAB 250 MCG 250 MCG: 250 TAB at 08:06

## 2019-06-25 RX ADMIN — Medication 100 MG: at 08:06

## 2019-06-25 RX ADMIN — FOLIC ACID 1 MG: 1 TABLET ORAL at 08:06

## 2019-06-25 RX ADMIN — LAMOTRIGINE 150 MG: 150 TABLET ORAL at 08:06

## 2019-06-25 RX ADMIN — THERA TABS 1 TABLET: TAB at 08:06

## 2019-06-25 NOTE — ASSESSMENT & PLAN NOTE
Patient presenting to OSH for seizures after reported noncompliance  Labs unremarkable, no leukocytosis and ethanol negative  CT head negative for acute bleed  Patient admitted to [place] but noted to have possible complex seizure activity  Case discussed with [facility] and neurology, recommending transfer to [facility] for 24hr EEG monitoring  Received keppra loading with 2g and transferred to [facility]    Plan:  -continue lamotrigine 150mg BID  -discontinue keppra 1000mg BID  -EEG 24hr monitoring - will follow up results  -Neurology consulted, appreciate recs

## 2019-06-25 NOTE — PLAN OF CARE
Problem: Fall Injury Risk  Goal: Absence of Fall and Fall-Related Injury    Intervention: Promote Injury-Free Environment  AAOx4. NAEO. POC and meds reviewed with patient at bedside. Questions encouraged and answered. Patient verbalized understanding. V/S stable throughout shift; please see flowsheets for full assessment data. Fall/safety/seizure precautions implemented and maintained. Urinal at bedside. Pt informed to call nurse to ambulate, needs reinforcement. Siderails up x2, bed locked in lowest position, call bell in reach, O2 & suction at bedside, TM.

## 2019-06-25 NOTE — HOSPITAL COURSE
Pt was admitted to hospital medicine EEG was done did not show any epilptical wave, neurology consulted were consulted and his breakthrough seizure due to medication non complaint and lamictal to be increased to 200 mg BID which was sent to [facility]- pt will be discharged with epilepsy appointment.

## 2019-06-25 NOTE — ASSESSMENT & PLAN NOTE
Patient denies recent alcohol use  Ethanol level at OSH was negative  Not tremulous on exam, no tachycardia on vitals  Appears unlikely that patient is in withdraw; however, given history, will order CIWA score and will address as necessary    - Denies any recent hypoglycemia.

## 2019-06-25 NOTE — SUBJECTIVE & OBJECTIVE
Interval History: no event overnight    Review of Systems   Constitutional: Negative for appetite change, chills and fever.   HENT: Negative for congestion.    Eyes: Negative for pain and itching.   Respiratory: Negative for cough, chest tightness and shortness of breath.    Cardiovascular: Negative for palpitations and leg swelling.   Gastrointestinal: Negative for abdominal pain and nausea.   Endocrine: Negative for polyphagia and polyuria.   Genitourinary: Negative for dysuria and frequency.   Musculoskeletal: Negative for back pain.   Neurological: Negative for numbness and headaches.   Psychiatric/Behavioral: Negative for agitation and behavioral problems.     Objective:     Vital Signs (Most Recent):  Temp: 97.5 °F (36.4 °C) (06/25/19 0715)  Pulse: 66 (06/25/19 0715)  Resp: 18 (06/25/19 0715)  BP: 106/61 (06/25/19 0715)  SpO2: (!) 92 % (06/25/19 0715) Vital Signs (24h Range):  Temp:  [97.5 °F (36.4 °C)-98.7 °F (37.1 °C)] 97.5 °F (36.4 °C)  Pulse:  [62-67] 66  Resp:  [16-18] 18  SpO2:  [91 %-93 %] 92 %  BP: ()/(61-66) 106/61     Weight: 71.3 kg (157 lb 3 oz)  Body mass index is 22.55 kg/m².  No intake or output data in the 24 hours ending 06/25/19 1228   Physical Exam   Constitutional: He is oriented to person, place, and time. He appears well-developed.   HENT:   Head: Normocephalic.   Cardiovascular: Normal rate, regular rhythm and normal heart sounds.   No murmur heard.  Pulmonary/Chest: Effort normal and breath sounds normal.   Abdominal: Soft. Bowel sounds are normal. He exhibits no distension. There is no tenderness.   Musculoskeletal: Normal range of motion.   Neurological: He is alert and oriented to person, place, and time.   Psychiatric: He has a normal mood and affect. His behavior is normal.       Significant Labs:   Blood Culture:   Recent Labs   Lab 06/24/19  0907   LABBLOO No Growth to date  No Growth to date  No Growth to date  No Growth to date     BMP:   Recent Labs   Lab  06/24/19  0516 06/25/19  0833   GLU 80 166*    137   K 3.6 3.7    102   CO2 25 25   BUN 15 16   CREATININE 0.8 0.9   CALCIUM 9.3 9.3   MG 2.0  --      CBC:   Recent Labs   Lab 06/24/19  0516 06/25/19  0833   WBC 7.67 7.33   HGB 12.6* 13.1*   HCT 37.5* 39.7*    200     CMP:   Recent Labs   Lab 06/24/19  0516 06/25/19  0833    137   K 3.6 3.7    102   CO2 25 25   GLU 80 166*   BUN 15 16   CREATININE 0.8 0.9   CALCIUM 9.3 9.3   ANIONGAP 11 10   EGFRNONAA >60.0 >60.0     TSH: No results for input(s): TSH in the last 4320 hours.  Urine Studies: No results for input(s): COLORU, APPEARANCEUA, PHUR, SPECGRAV, PROTEINUA, GLUCUA, KETONESU, BILIRUBINUA, OCCULTUA, NITRITE, UROBILINOGEN, LEUKOCYTESUR, RBCUA, WBCUA, BACTERIA, SQUAMEPITHEL, HYALINECASTS in the last 48 hours.    Invalid input(s): WRIGHTSUR  All pertinent labs within the past 24 hours have been reviewed.    Significant Imaging: I have reviewed and interpreted all pertinent imaging results/findings within the past 24 hours.

## 2019-06-25 NOTE — SUBJECTIVE & OBJECTIVE
Subjective:     Interval History:   No clinical events overnight or this morning  Lamictal level low at 2.2  Extended EEG reviewed and c/w L temporal epileptiform discharges     Current Facility-Administered Medications   Medication Dose Route Frequency Provider Last Rate Last Dose    cyanocobalamin tablet 250 mcg  250 mcg Oral Daily Edith Pérez MD   250 mcg at 06/25/19 0820    dextrose 10% (D10W) Bolus  12.5 g Intravenous PRN Jassi Irwin MD        dextrose 10% (D10W) Bolus  25 g Intravenous PRN Jassi Irwin MD        diazePAM injection 5 mg  5 mg Intravenous Q6H PRN Jocelyn Maier MD        enoxaparin injection 40 mg  40 mg Subcutaneous Daily Edith Pérez MD   40 mg at 06/24/19 1830    folic acid tablet 1 mg  1 mg Oral Daily Edith Pérez MD   1 mg at 06/25/19 0820    glucagon (human recombinant) injection 1 mg  1 mg Intramuscular PRN Jocelyn Maier MD        glucose chewable tablet 16 g  16 g Oral PRN Jocelyn Maier MD        glucose chewable tablet 24 g  24 g Oral PRN Jocelyn Maier MD        lamoTRIgine tablet 150 mg  150 mg Oral BID Edith Pérez MD   150 mg at 06/25/19 0820    LORazepam tablet 2 mg  2 mg Oral Q4H PRN Edith Pérez MD        multivitamin tablet 1 tablet  1 tablet Oral Daily Barbie Orellana MD   1 tablet at 06/25/19 0820    sodium chloride 0.9% flush 10 mL  10 mL Intravenous PRN Jocelyn Maier MD        thiamine tablet 100 mg  100 mg Oral Daily Jocelyn Maier MD   100 mg at 06/25/19 0820     Review of Systems   Constitutional: Positive for fatigue. Negative for fever.   Eyes: Negative for visual disturbance.   Respiratory: Negative for chest tightness and shortness of breath.    Cardiovascular: Negative for chest pain.   Neurological: Negative for dizziness, tremors, seizures, facial asymmetry, weakness and headaches.   Psychiatric/Behavioral: Negative for confusion.     Objective:     Vital Signs (Most  Recent):  Temp: 97.5 °F (36.4 °C) (06/25/19 0715)  Pulse: 66 (06/25/19 0715)  Resp: 18 (06/25/19 0715)  BP: 106/61 (06/25/19 0715)  SpO2: (!) 92 % (06/25/19 0715) Vital Signs (24h Range):  Temp:  [97.5 °F (36.4 °C)-98.7 °F (37.1 °C)] 97.5 °F (36.4 °C)  Pulse:  [62-67] 66  Resp:  [16-18] 18  SpO2:  [91 %-93 %] 92 %  BP: ()/(61-66) 106/61     Weight: 71.3 kg (157 lb 3 oz)  Body mass index is 22.55 kg/m².    Physical Exam   Eyes: EOM are normal.   Neurological: He has a normal Finger-Nose-Finger Test.   Psychiatric: His speech is normal.     NEUROLOGICAL EXAMINATION:     MENTAL STATUS   Oriented to person.   Oriented to place.   Follows 2 step commands.   Attention: normal. Concentration: normal.   Speech: speech is normal   Level of consciousness: alert    CRANIAL NERVES     CN III, IV, VI   Extraocular motions are normal.   Nystagmus: none     CN VII   Facial expression full, symmetric.     CN VIII   Hearing: intact    CN IX, X   Palate: symmetric    MOTOR EXAM   Muscle bulk: normal  Right arm pronator drift: absent  Left arm pronator drift: absent       Moving all extremities spontaneously with good tone      GAIT AND COORDINATION      Coordination   Finger to nose coordination: normal    Tremor   Resting tremor: absent    Significant Labs:   Hemoglobin A1c: No results for input(s): HGBA1C in the last 720 hours.  Blood Culture:   Recent Labs   Lab 06/24/19  0907   LABBLOO No Growth to date  No Growth to date  No Growth to date  No Growth to date     CBC:   Recent Labs   Lab 06/24/19  0516 06/25/19  0833   WBC 7.67 7.33   HGB 12.6* 13.1*   HCT 37.5* 39.7*    200     CMP:   Recent Labs   Lab 06/24/19  0516 06/25/19  0833   GLU 80 166*    137   K 3.6 3.7    102   CO2 25 25   BUN 15 16   CREATININE 0.8 0.9   CALCIUM 9.3 9.3   MG 2.0  --    ANIONGAP 11 10   EGFRNONAA >60.0 >60.0     Inflammatory Markers: No results for input(s): SEDRATE, CRP, PROCAL in the last 48 hours.  Urine Culture: No  results for input(s): LABURIN in the last 48 hours.  Urine Studies: No results for input(s): COLORU, APPEARANCEUA, PHUR, SPECGRAV, PROTEINUA, GLUCUA, KETONESU, BILIRUBINUA, OCCULTUA, NITRITE, UROBILINOGEN, LEUKOCYTESUR, RBCUA, WBCUA, BACTERIA, SQUAMEPITHEL, HYALINECASTS in the last 48 hours.    Invalid input(s): WRIGHTSUR     --Lamotrigine level 2.2    All pertinent lab results from the past 24 hours have been reviewed.    24 hr vEEG (6/24-6/25/19): 14 hr 50 min  Abundant left temporal epileptiform discharges seen. Intermittent left temporal slowing seen c/w underlying left temporal lobe epilepsy.  Mild generalized slowing seen, suggestive of mild diffuse or multifocal cerebral dysfunction. No electrographic seizures seen.    Significant Imaging: I have reviewed and interpreted all pertinent imaging results/findings within the past 24 hours.     CT Head WO contrast (6/22/19):  No detrimental change or acute intracranial abnormality definitively seen. Specifically, no intracranial hemorrhage.
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
## 2019-06-25 NOTE — PROCEDURES
EEG/VIDEO MONITORING REPORT    Terrell Yusuf  [id number]  1960    DATE OF SERVICE: 6/24-25/19    DATE OF ADMISSION: 6/23/2019 10:28 PM    ADMITTING PROVIDER: Jassi Irwin MD    METHODOLOGY   Electroencephalographic (EEG) recording is with electrodes placed according to the International 10-20 placement system.  Thirty two (32) channels of digital signal are simultaneously recorded from the scalp and may include EKG, EMG, and/or eye monitors.   Recording band pass was 0.1 to 512 hz.  Digital video recording of the patient is simultaneously recorded with the EEG.  The nursing staff report clinical symptoms and may press an event button when the patient has symptoms of clinical interest to the treating physicians.  EEG and video recording is stored and archived in digital format.  The entire recording is visually reviewed and the times identified by computer analysis as being spikes or seizures are reviewed again.  Activation procedures which include photic stimulation, hyperventilation and instructing patients to perform simple task are done in selected patients.   Compresses spectral analysis (CSA) is also performed on the activity recorded from each individual channel.  This is displayed as a power display of frequencies from 0 to 30 Hz over time.   The CSA analysis is done and displayed continuously.  This is reviewed for asymmetries in power between homologous areas of the scalp and for presence of changes in power which canbe seen when seizures occur.  Sections of suspected abnormalities on the CSA is then compared with the original EEG recording.     [employer] software was also utilized in the review of this study.  This software suite analyzes the EEG recording in multiple domains.  Coherence and rhythmicity is computed to identify EEG sections which may contain organized seizures.  Each channel undergoes analysis to detect presence of spike and sharp waves which have special and morphological  characteristic of epileptic activity.  The routine EEG recording is converted from spacial into frequency domain.  This is then displayed comparing homologous areas to identify areas of significant asymmetry.  Algorithm to identify non-cortically generated artifact is used to separate eye movement, EMG and other artifact from the EEG.      Recording Times  Start on 6/24/19, 16:10  Stop on 6/25/19, 14:41    A total of 22 hours and 31 minutes of EEG was recorded.    EEG FINDINGS  Background activity:   The background rhythm was characterized by theta-alpha (7-8 Hz) activity with a 8 Hz posterior dominant alpha rhythm at 30-70 microvolts.   Symmetry and continuity: the background was continuous; asymmetry with intermittent left temporal (delta 2-4 Hz) slowing was seen         Sleep:   Normal sleep transients including sleep spindles, K complexes, vertex waves and POSTS were seen.    Activation procedures:   Hyperventilation and photic stimulation were not performed     Abnormal activity:   Abundant left temporal (maximal T1) sharp waves were seen, at times in short runs, without evolution or associated clinical correlate. These were maximal during sleep.            No electrographic seizures seen.    IMPRESSION:   This is an abnormal C-EEG due to:  1) abundant left temporal epileptiform discharges seen  2) intermittent left temporal slowing seen  - suggestive of underlying left temporal lobe epilepsy  3) mild generalized slowing seen, suggestive of mild diffuse or multifocal cerebral dysfunction.    No electrographic seizures seen.    CLINICAL CORRELATION IS RECOMMENDED.    Faina Acuna MD, CHERYL(), RJ WOOD.  Neurology-Epilepsy.  [facility].

## 2019-06-25 NOTE — ASSESSMENT & PLAN NOTE
Patient presenting to OSH for seizures after reported noncompliance  Labs unremarkable, no leukocytosis and ethanol negative  CT head negative for acute bleed  Patient admitted to [place] but noted to have possible complex seizure activity  Case discussed with NCC and neurology, recommending transfer to [facility] for 24hr EEG monitoring  Received keppra loading with 2g and transferred to [facility]    Plan:  -continue lamotrigine 200 mg BID and follow up with neurology in 4-6 weeks ( since patient is homeless and does not own a phone and it will be challenging to contact him but he is aware and will secure a phone and contact us).

## 2019-06-25 NOTE — PLAN OF CARE
Problem: Physical Therapy Goal  Goal: Physical Therapy Goal  PT eval and d/c completed. No LTGs established.  Juan Jose Guardado, SPT  6/25/2019
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
## 2019-06-25 NOTE — PT/OT/SLP EVAL
"Physical Therapy Evaluation and Discharge Note    Patient Name:  Terrell Yusuf   MRN:  [id number]    Recommendations:     Discharge Recommendations:  home   Discharge Equipment Recommendations: none   Barriers to discharge: None    Assessment:     Terrell Yusuf is a 59 y.o. male admitted with a medical diagnosis of Seizures. At this time, patient is functioning at their prior level of function and does not require further acute PT services. Pt mark session well. Pt remains independent with functional mobility and PT has no recommendations upon d/c.    Recent Surgery: * No surgery found *      Plan:     During this hospitalization, patient does not require further acute PT services.  Please re-consult if situation changes.      Subjective     Chief Complaint: none reported  Patient/Family Comments/goals: return home  Pain/Comfort:  · Pain Rating 1: 5/10  · Location - Side 1: Left  · Location - Orientation 1: generalized  · Location 1: shoulder  · Pain Rating Post-Intervention 1: (Not reported)    Patients cultural, spiritual, Zoroastrianism conflicts given the current situation: no    Living Environment:  Pt is homeless and reports living in a tent.   Prior to admission, patients level of function was (i) with mobilty. Pt reports "staying active" by riding a bike. Equipment used at home: none.  DME owned (not currently used): none.  Upon discharge, patient will have no assistance.    Objective:     Communicated with nsg prior to session.  Patient found supine with EEG upon PT entry to room.    General Precautions: Standard, fall, seizure   Orthopedic Precautions:N/A   Braces: N/A     Exams:  · Cognitive Exam:  Patient is oriented to Person, Place, Time and Situation  · Sensation: -       Impaired  light/touch to plantar surface of foot  · Skin Integrity/Edema:  -       Skin integrity: Visible skin intact  · LUE Strength: WFL  · RLE ROM: WFL  · RLE Strength: WFL  · LLE ROM: WFL  · LLE Strength: WFL    Functional " Mobility:  · Bed Mobility:  Supine to Sit: independence  · Sit to Supine: independence  · Transfers:  Sit to Stand:  independence with no AD  · Gait: Pt amb 30 ft (i) and no AD. Ambulation distance limited by lines.    AM-PAC 6 CLICK MOBILITY  Total Score:23       Therapeutic Activities and Exercises:  Pt informed of d/c from PT due to his independence in functional mobility. Pt v/u.    AM-PAC 6 CLICK MOBILITY  Total Score:23     Patient left supine with all lines intact and call button in reach.    GOALS:   Multidisciplinary Problems     Physical Therapy Goals     Not on file                History:     Past Medical History:   Diagnosis Date    Ex-cigarette smoker     9 years ago    Seizures        History reviewed. No pertinent surgical history.    Time Tracking:     PT Received On: 06/25/19  PT Start Time: 0915     PT Stop Time: 0926  PT Total Time (min): 11 min     Billable Minutes: Evaluation 11      Juan Jose Guardado [facility]  06/25/2019
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
## 2019-06-25 NOTE — PT/OT/SLP EVAL
Occupational Therapy   Evaluation and Discharge Note    Name: Terrell Yusuf  MRN: [id number]  Admitting Diagnosis:  Seizures      Recommendations:     Discharge Recommendations: home  Discharge Equipment Recommendations:  none  Barriers to discharge:  None    Assessment:     Terrell Yusuf is a 59 y.o. male with a medical diagnosis of Seizures. At this time, patient is functioning at their prior level of function and does not require further acute OT services.     Plan:     During this hospitalization, patient does not require further acute OT services.  Please re-consult if situation changes.    · Plan of Care Reviewed with: patient    Subjective     Chief Complaint: None stated  Patient/Family Comments/goals: None stated    Occupational Profile:  Living Environment: Pt is currently homeless and lives in a tent.  Previous level of function: (I) with ADLs and mobility; rides a bike  Equipment Used at home:  none  Assistance upon Discharge: Pt does not have assistance available    Pain/Comfort:  · Pain Rating 1: (Did not rate)  · Location - Side 1: Left  · Location 1: shoulder    Patients cultural, spiritual, Cheondoism conflicts given the current situation: no    Objective:     Communicated with: RN prior to session. Patient found with HOB elevated with EEG upon OT entry to room.    General Precautions: Standard, fall, seizure   Orthopedic Precautions:N/A   Braces: N/A     Occupational Performance:    Bed Mobility:    · Patient completed Supine to Sit with modified independence  · Patient completed Sit to Supine with modified independence    Functional Mobility/Transfers:  · Patient completed Sit <> Stand Transfer with supervision with no assistive device from EOB 2 trials  · Functional Mobility: Within room household distance with supervision no AD-- limited by EEG    Activities of Daily Living:  · Lower Body Dressing: independence to don socks    Cognitive/Visual Perceptual:  Cognitive/Psychosocial Skills:     -        Oriented to: Person, Place, Time and Situation   -       Follows Commands/attention: Follows multistep commands  -       Communication: clear/fluent  -       Safety awareness/insight to disability: intact   Visual/Perceptual:      -Intact    Physical Exam:  Balance:    -       good sitting and standing balance  Postural examination/scapula alignment:    -       No postural abnormalities identified  Sensation:    -       Intact  Dominant hand:    -       Right  Upper Extremity Range of Motion:     -       Right Upper Extremity: WFL  -       Left Upper Extremity: WFL  Upper Extremity Strength:    -       Right Upper Extremity: WFL  -       Left Upper Extremity: WFL   Strength:    -       Right Upper Extremity: WFL  -       Left Upper Extremity: WFL  Fine Motor Coordination:    -       Intact    AMPAC 6 Click ADL:  AMPAC Total Score: 24    Treatment & Education:  OT eval; educated on OT role and POC including no further acute OT needs and to call for assistance before getting up  Education:    Patient left HOB elevated with all lines intact and call button in reach    GOALS:   Multidisciplinary Problems     Occupational Therapy Goals     Not on file          Multidisciplinary Problems (Resolved)        Problem: Occupational Therapy Goal    Goal Priority Disciplines Outcome Interventions   Occupational Therapy Goal   (Resolved)     OT, PT/OT Outcome(s) achieved                    History:     Past Medical History:   Diagnosis Date    Ex-cigarette smoker     9 years ago    Seizures        History reviewed. No pertinent surgical history.    Time Tracking:     OT Date of Treatment: 06/25/19  OT Start Time: 0915  OT Stop Time: 0926  OT Total Time (min): 11 min    Billable Minutes:Evaluation 11 minutes    LAURA Muhammad  6/25/2019
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
## 2019-06-25 NOTE — PLAN OF CARE
Problem: Occupational Therapy Goal  Goal: Occupational Therapy Goal  Outcome: Outcome(s) achieved Date Met: 06/25/19  [place] and D/C OT 6/25/19

## 2019-06-25 NOTE — PROGRESS NOTES
Attempted to call Pt's caregivers: Jacinda Holder. 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 ( [phone]) and ( [phone])

## 2019-06-25 NOTE — ASSESSMENT & PLAN NOTE
- Believes that FRANCISCO talks to him but he verbalize that it's no logical that he sees him or hear him but he is tired of the medication, but after his last seizure he is grateful that he did not die and will never stop medication again.

## 2019-06-25 NOTE — ASSESSMENT & PLAN NOTE
Patient denies recent alcohol use  Ethanol level at OSH was negative  Not tremulous on exam, no tachycardia on vitals  Appears unlikely that patient is in withdraw; however, given history, will order CIWA score and will address as necessary    Plan:  -CIWA protocol placed with instructions to call primary team if CIWA score > 8

## 2019-06-26 LAB
CLOBAZAM SERPL-MCNC: <10 NG/ML (ref 30–300)
NORCLOBAZAM SERPL-MCNC: <100 NG/ML (ref 300–3000)

## 2019-06-26 NOTE — PLAN OF CARE
06/26/19 1247   Final Note   Assessment Type Final Discharge Note   Anticipated Discharge Disposition Home   Right Care Referral Info   Facility Name Pt is living in a tent near the [employer].

## 2019-06-29 LAB
BACTERIA BLD CULT: NORMAL
BACTERIA BLD CULT: NORMAL
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
## 2019-09-26 ENCOUNTER — HOSPITAL ENCOUNTER (EMERGENCY)
Facility: HOSPITAL | Age: 59
Discharge: HOME | End: 2019-09-26
Attending: EMERGENCY MEDICINE
Payer: MEDICAID

## 2019-09-26 VITALS
HEIGHT: 70 IN | WEIGHT: 160 LBS | BODY MASS INDEX: 22.9 KG/M2 | TEMPERATURE: 98 F | SYSTOLIC BLOOD PRESSURE: 111 MMHG | HEART RATE: 75 BPM | DIASTOLIC BLOOD PRESSURE: 68 MMHG | OXYGEN SATURATION: 96 % | RESPIRATION RATE: 16 BRPM

## 2019-09-26 DIAGNOSIS — L73.9 FOLLICULITIS: Primary | ICD-10-CM

## 2019-09-26 PROCEDURE — 99283 EMERGENCY DEPT VISIT LOW MDM: CPT

## 2019-09-26 PROCEDURE — 99284 EMERGENCY DEPT VISIT MOD MDM: CPT | Mod: ,,, | Performed by: EMERGENCY MEDICINE

## 2019-09-26 PROCEDURE — 99284 PR EMERGENCY DEPT VISIT,LEVEL IV: ICD-10-PCS | Mod: ,,, | Performed by: EMERGENCY MEDICINE

## 2019-09-26 RX ORDER — DOXYCYCLINE 100 MG/1
100 CAPSULE ORAL 2 TIMES DAILY
Qty: 20 CAPSULE | Refills: 0 | Status: SHIPPED | OUTPATIENT
Start: 2019-09-26 | End: 2019-10-06

## 2019-09-26 RX ORDER — LEVETIRACETAM 1000 MG/1
1500 TABLET ORAL 2 TIMES DAILY
COMMUNITY
End: 2019-10-04 | Stop reason: SDUPTHER

## 2019-09-26 NOTE — ED NOTES
Patient identifiers verified and correct for Mr Yusuf  C/C: rash to lower jaw SEE NN  APPEARANCE: awake and alert in NAD.  SKIN: warm, dry casandra lower jaw rash with redness noted to right lower jaw into neck   MUSCULOSKELETAL: Patient moving all extremities spontaneously, no obvious swelling or deformities noted. Ambulates independently.  RESPIRATORY: Denies shortness of breath.Respirations unlabored.   CARDIAC: Denies CP, 2+ distal pulses; no peripheral edema  ABDOMEN: S/ND/NT, Denies nausea  : voids spontaneously, denies difficulty  Neurologic: AAO x 4; follows commands equal strength in all extremities; denies numbness/tingling. Denies dizziness Denies weakness

## 2019-09-26 NOTE — ED PROVIDER NOTES
"Encounter Date: 9/26/2019    SCRIBE #1 NOTE: I, Red Felton, am scribing for, and in the presence of,  Dr. Weller. I have scribed the entire note.       History     Chief Complaint   Patient presents with    Skin Problem     Rash to face after using dollar tree shaving cream and razors a few weeks ago.      Patient is a 59 year old male presenting with a skin problem. Started to have problems with irritation after using dollar tree shaving cream. Allergies include codeine. Past history includes seizures.     The history is provided by the patient.     Review of patient's allergies indicates:   Allergen Reactions    Codeine Other (See Comments)     As a child and teenager     Past Medical History:   Diagnosis Date    Ex-cigarette smoker     9 years ago    Seizures      History reviewed. No pertinent surgical history.  Family History   Problem Relation Age of Onset    No Known Problems Mother     No Known Problems Father      Social History     Tobacco Use    Smoking status: Former Smoker     Types: Cigarettes    Smokeless tobacco: Never Used    Tobacco comment: 9 years ago   Substance Use Topics    Alcohol use: Not Currently     Comment: Former abuser, last drink in Feb    Drug use: Not Currently     Types: "Crack" cocaine, Cocaine, MDMA (Ecstacy), Marijuana     Comment: Former user     Review of Systems   Constitutional: Negative for chills and fever.   HENT: Negative for sore throat and trouble swallowing.    Respiratory: Negative for cough and shortness of breath.    Cardiovascular: Negative for chest pain and palpitations.   Gastrointestinal: Negative for nausea and vomiting.   Genitourinary: Negative for dysuria and hematuria.   Musculoskeletal: Negative for back pain and neck pain.   Skin:        Folliculitis   Neurological: Negative for light-headedness and headaches.   Psychiatric/Behavioral: Negative for behavioral problems and confusion.   All other systems reviewed and are " negative.      Physical Exam     Initial Vitals [09/26/19 1807]   BP Pulse Resp Temp SpO2   111/68 75 16 98 °F (36.7 °C) 96 %      MAP       --         Physical Exam    Nursing note and vitals reviewed.  Constitutional: He appears well-developed and well-nourished. No distress.   HENT:   Head: Normocephalic and atraumatic.   Eyes: EOM are normal. Pupils are equal, round, and reactive to light.   Neck: Normal range of motion. Neck supple.   Cardiovascular: Normal rate, regular rhythm, normal heart sounds and intact distal pulses.   Pulmonary/Chest: Breath sounds normal. No respiratory distress.   Abdominal: Soft. Bowel sounds are normal. He exhibits no distension. There is no tenderness.   Musculoskeletal: Normal range of motion. He exhibits no tenderness.   Neurological: He is alert and oriented to person, place, and time. He has normal strength and normal reflexes. No cranial nerve deficit or sensory deficit.   Skin:   Scattered rash to cheeks consistent with folliculitis. No abscess. Minimal warmth.          ED Course   Procedures  Labs Reviewed - No data to display       Imaging Results    None          Medical Decision Making:   History:   Old Medical Records: I decided to obtain old medical records.  Initial Assessment:   Patient with folliculitis. Will prescribe doxycycline and no shaving for 5 days.             Scribe Attestation:   Scribe #1: I performed the above scribed service and the documentation accurately describes the services I performed. I attest to the accuracy of the note.               Clinical Impression:       ICD-10-CM ICD-9-CM   1. Folliculitis L73.9 704.8         Disposition:   Disposition: Discharged  Condition: Stable                        Allan Weller MD  09/26/19 1932

## 2019-09-26 NOTE — ED TRIAGE NOTES
Patient states he is using dollar tree shaving creme, with redness noted to casandra lower jaw on face.

## 2019-10-04 ENCOUNTER — HOSPITAL ENCOUNTER (EMERGENCY)
Facility: HOSPITAL | Age: 59
Discharge: HOME | End: 2019-10-04
Attending: EMERGENCY MEDICINE
Payer: MEDICAID

## 2019-10-04 VITALS
HEIGHT: 70 IN | BODY MASS INDEX: 23.62 KG/M2 | OXYGEN SATURATION: 100 % | RESPIRATION RATE: 18 BRPM | HEART RATE: 58 BPM | SYSTOLIC BLOOD PRESSURE: 105 MMHG | WEIGHT: 165 LBS | DIASTOLIC BLOOD PRESSURE: 63 MMHG | TEMPERATURE: 98 F

## 2019-10-04 DIAGNOSIS — M54.50 ACUTE RIGHT-SIDED LOW BACK PAIN WITHOUT SCIATICA: Primary | ICD-10-CM

## 2019-10-04 PROCEDURE — 99284 EMERGENCY DEPT VISIT MOD MDM: CPT | Mod: 25

## 2019-10-04 PROCEDURE — 99284 EMERGENCY DEPT VISIT MOD MDM: CPT | Mod: ,,, | Performed by: EMERGENCY MEDICINE

## 2019-10-04 PROCEDURE — 99284 PR EMERGENCY DEPT VISIT,LEVEL IV: ICD-10-PCS | Mod: ,,, | Performed by: EMERGENCY MEDICINE

## 2019-10-04 PROCEDURE — 25000003 PHARM REV CODE 250: Performed by: EMERGENCY MEDICINE

## 2019-10-04 RX ORDER — NAPROXEN 500 MG/1
500 TABLET ORAL
Status: COMPLETED | OUTPATIENT
Start: 2019-10-04 | End: 2019-10-04

## 2019-10-04 RX ORDER — IBUPROFEN 600 MG/1
600 TABLET ORAL EVERY 6 HOURS PRN
Qty: 20 TABLET | Refills: 0 | Status: SHIPPED | OUTPATIENT
Start: 2019-10-04 | End: 2019-12-20 | Stop reason: ALTCHOICE

## 2019-10-04 RX ORDER — LEVETIRACETAM 500 MG/1
1500 TABLET ORAL 2 TIMES DAILY
Qty: 180 TABLET | Refills: 0 | Status: SHIPPED | OUTPATIENT
Start: 2019-10-04 | End: 2020-08-10 | Stop reason: SDUPTHER

## 2019-10-04 RX ADMIN — NAPROXEN 500 MG: 500 TABLET ORAL at 10:10

## 2019-10-04 NOTE — ED TRIAGE NOTES
C/o back pain w/ standing , walking and leaning over. Getting worse  but chronic for last 8 months.

## 2019-10-04 NOTE — ED NOTES
LOC: The patient is awake and alert; oriented x 3 and speaking appropriately.  APPEARANCE: Patient resting comfortably, patient is clean and well groomed  SKIN: warm and dry, normal skin turgor & moist mucus membranes, skin intact, no breakdown noted.Being treated for folliculitis on face  MUSCULOSKELETAL: Patient moving all extremities well, no obvious swelling or deformities noted, c.o  lower  Back pain  X 8 months , worse this last week  RESPIRATORY: Airway is open and patent,  respirations are spontaneous, normal effort and rate  CARDIAC: Patient has a normal rate, no peripheral edema noted, capillary refill < 3 seconds; No complaints of chest pain   ABDOMEN: Soft and non tender to palpation, no distention noted.

## 2019-10-14 ENCOUNTER — HOSPITAL ENCOUNTER (EMERGENCY)
Facility: HOSPITAL | Age: 59
Discharge: HOME | End: 2019-10-14
Attending: EMERGENCY MEDICINE
Payer: MEDICAID

## 2019-10-14 VITALS
RESPIRATION RATE: 18 BRPM | TEMPERATURE: 98 F | DIASTOLIC BLOOD PRESSURE: 65 MMHG | HEIGHT: 70 IN | OXYGEN SATURATION: 97 % | SYSTOLIC BLOOD PRESSURE: 121 MMHG | BODY MASS INDEX: 23.62 KG/M2 | HEART RATE: 70 BPM | WEIGHT: 165 LBS

## 2019-10-14 DIAGNOSIS — M54.50 MIDLINE LOW BACK PAIN WITHOUT SCIATICA, UNSPECIFIED CHRONICITY: ICD-10-CM

## 2019-10-14 DIAGNOSIS — L25.9 SKIN IRRITATION FROM SHAVING: Primary | ICD-10-CM

## 2019-10-14 PROCEDURE — 99284 EMERGENCY DEPT VISIT MOD MDM: CPT

## 2019-10-14 PROCEDURE — 99284 EMERGENCY DEPT VISIT MOD MDM: CPT | Mod: ,,, | Performed by: EMERGENCY MEDICINE

## 2019-10-14 PROCEDURE — 99284 PR EMERGENCY DEPT VISIT,LEVEL IV: ICD-10-PCS | Mod: ,,, | Performed by: EMERGENCY MEDICINE

## 2019-10-14 RX ORDER — HYDROCORTISONE 1 %
CREAM (GRAM) TOPICAL
Qty: 28 G | Refills: 0 | Status: SHIPPED | OUTPATIENT
Start: 2019-10-14 | End: 2020-08-10

## 2019-10-14 RX ORDER — LIDOCAINE 50 MG/G
1 PATCH TOPICAL DAILY
Qty: 7 PATCH | Refills: 0 | Status: SHIPPED | OUTPATIENT
Start: 2019-10-14 | End: 2020-08-10

## 2019-10-14 NOTE — DISCHARGE INSTRUCTIONS
Use only brand new blades when saving.  Use the cream to your face twice a day. You can also use Cera Ve cream to the skin as well. Use your prescription ibuprofen 4 times a day for the next 5 or 6 days for back pain.

## 2019-10-14 NOTE — ED NOTES
"Pt states, "with the lord you will get common sense and understanding, like the father says in heaven people need to wake up and see the light". Pt states, "this is my face and I need to get something to clear this up".   "

## 2019-10-14 NOTE — ED TRIAGE NOTES
" Pt presents with c/o Lower to mid back pain that started 3-4 wks ago. Pt reports he had finished doxycycline that he was on for 10 days from irritation, redness that was on his face. Pt reports that he was seen at [facility] and provider wanted to give him lotion. Pt wants a rx for antibiotics to "get this roughness out".   "
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
## 2019-10-14 NOTE — ED PROVIDER NOTES
"Encounter Date: 10/14/2019       History     Chief Complaint   Patient presents with    Back Pain    Skin Problem     wanting antibiotics     59-year-old male with a history of seizures presents to the ED with concerns about a rash on his face as well as back pain. Patient reports pain across his low back that has been present for the past 3 or 4 weeks.  He denies injury or a fall.  He denies pain radiating into the extremities, lower extremity weakness or numbness or tingling.  He brings a full prescription bottle of 600 mg ibuprofen with him stating he recently went to daughter's of [employer].  Patient also concerned about a rash to his face.  He brings an empty bottle of doxycycline and states that he needs a refill of this medication for the bumps on his face.  Patient is answering questioning appropriately, but frequently references Anders. For example, when asked about allergies, he states that he is allergic to Anders Ian.  He also states that the Lord talks to him on an AM radio station. He states "no one ever wants to talk about love and Anders".  When asked his name during introduction, he states his name "from Anders Sosa in [place]".          Review of patient's allergies indicates:  No Known Allergies  No past medical history on file.  No past surgical history on file.  No family history on file.  Social History     Tobacco Use    Smoking status: Former Smoker    Smokeless tobacco: Never Used   Substance Use Topics    Alcohol use: Not Currently    Drug use: Not Currently     Review of Systems   Constitutional: Negative for fever.   HENT: Negative for sore throat.    Respiratory: Negative for shortness of breath.    Cardiovascular: Negative for chest pain.   Gastrointestinal: Negative for nausea.   Genitourinary: Negative for dysuria.   Musculoskeletal: Positive for back pain.   Skin: Positive for rash.   Neurological: Negative for weakness.   Hematological: Does not bruise/bleed easily. "       Physical Exam     Initial Vitals [10/14/19 1027]   BP Pulse Resp Temp SpO2   121/65 70 18 97.6 °F (36.4 °C) 97 %      MAP       --         Physical Exam    Nursing note and vitals reviewed.  Constitutional: He appears well-developed and well-nourished.  Non-toxic appearance. He does not appear ill. No distress.   Clean shaven, clothes clean    HENT:   Head: Normocephalic and atraumatic.   Neck: Normal range of motion. Neck supple.   Cardiovascular: Normal rate and regular rhythm. Exam reveals no gallop, no distant heart sounds and no friction rub.    No murmur heard.  Pulmonary/Chest: Effort normal and breath sounds normal. No accessory muscle usage. No tachypnea. No respiratory distress. He has no decreased breath sounds. He has no wheezes. He has no rhonchi. He has no rales.   Abdominal: He exhibits no distension.   Neurological: He is alert.   Skin: No rash noted.   There is mild dermatitis at the angle of the right jaw.  There is no significant erythema, warmth or induration. Pt is clean shaven.    Psychiatric: He has a normal mood and affect. Thought content is delusional.         ED Course   Procedures  Labs Reviewed - No data to display       Imaging Results    None          Medical Decision Making:   History:   Old Medical Records: I decided to obtain old medical records.  Differential Diagnosis:   My differential diagnosis includes but is not limited to:  Chronic back pain, herniated disc, muscle strain, DDD   ED Management:  59-year-old male presents to the ED with complaints of back pain for 3-4 weeks as well as concern for a rash to his face for the past couple of weeks.  Vitals within normal limits. Patient is afebrile and in no acute distress. Patient is clean shaven with minimal area of dermatitis to the angle of the right jaw.  I see no significant infection.  Patient is recently shaven.  Patient continually making bizarre statements relating to Anders Ian throughout my evaluation.  Though  his comments are bizarre, I do not feel that he is gravely disabled and do not feel that a PEC is warranted at this time.  His clothes are clean and he appears to be capable of caring for himself.  I had a long discussion with the patient regarding shaving and potential for razor burn.  I will discharge with a prescription for topical hydrocortisone cream.  Regarding patient's back pain, he had a CT of the lumbar spine 10 days ago that was negative for acute abnormalities.  There was evidence of DDD.  Patient has a full Rx bottle of 600 mg ibuprofen.  I will discharge with Lidoderm patches to be used in addition with NSAIDs.  Advised patient to follow up closely with his PCP for any further concerns.  ED return precautions given.  Stable for discharge.  I have reviewed the patient's records and discussed this case with my supervising physician.                Attending Attestation:     Physician Attestation Statement for NP/PA:   I have conducted a face to face encounter with this patient in addition to the NP/PA, due to NP/PA Request          Attending ED Notes:   Evaluated the patient who does seem to be delusional, possibly psychosis present.  However he is clean, well dressed.  He is organized in his thoughts.  I do not feel that he needs to be PEC'd at this time             Clinical Impression:       ICD-10-CM ICD-9-CM   1. Skin irritation from shaving L25.9 692.9   2. Midline low back pain without sciatica, unspecified chronicity M54.5 724.2         Disposition:   Disposition: Discharged  Condition: Stable                        Cindy Hou PA-C  10/14/19 2050

## 2019-10-18 ENCOUNTER — PATIENT OUTREACH (OUTPATIENT)
Dept: EMERGENCY MEDICINE | Facility: HOSPITAL | Age: 59
End: 2019-10-18

## 2019-10-18 NOTE — PROGRESS NOTES
Kianna Monterroso  ED Navigator  Emergency Department    Project: [facility] ED Navigator  Role: Community Health Worker    Date: 10/18/2019  Patient Name: Terrell Yusuf  MRN: [id number]  PCP: Primary Doctor No    Assessment:     Terrell Yusuf is a 59 y.o. male who has presented to ED for No chief complaint on file.  . Patient has visited the ED 3 times in the past 3 months. Patient did not  contact PCP.     ED Navigator Patient Assessment    Transportation  Insurance Coverage  Do you have coverage/adequate coverage?:  Yes  Type/kind of coverage:  Medicaid  Is patient able to afford co-pays/deductibles?:  Yes  Is patient able to afford HME or supplies?:  Yes  Does patient have an established [facility] PCP?:  No  Does patient need assistance finding a PCP?:  No  Does the patient have a lack of adequate coverage?:  No  Specialist Appointment  Did the patient come to the ED to see a specialist?:  No  Does the patient have a pending specialist referral?:  No  Does the patient have a specialist appointment made?:  No  PCP Follow Up Appointment  Does the patient have a follow up appontment with their PCP?:  No  Why does the patient not have a follow up scheduled?:  No established [facility]/outside PCP  Would you like an [facility] PCP?:  No  Medications  Psychological  Does the patient have psycho-social concerns?:  No  Food  Does the patient have concerns about food?:  No  Communication/Education  Does the patient have limited English proficiency/English not primary language?:  No  Does patient have low literacy and/or low health literacy?:  No  Does patient have concerns with care?:  No  Does patient have dissatisfaction with care?:  No  Other Financial Concers  Other Social Barriers/Concerns         Social History     Socioeconomic History    Marital status: Unknown     Spouse name: Not on file    Number of children: Not on file    Years of education: Not on file    Highest education level: Not on file   Occupational History    Not  "on file   Social Needs    Financial resource strain: Patient refused    Food insecurity:     Worry: Patient refused     Inability: Patient refused    Transportation needs:     Medical: Patient refused     Non-medical: Patient refused   Tobacco Use    Smoking status: Former Smoker     Types: Cigarettes    Smokeless tobacco: Never Used    Tobacco comment: 9 years ago   Substance and Sexual Activity    Alcohol use: Not Currently     Comment: Former abuser, last drink in Feb    Drug use: Not Currently     Types: "Crack" cocaine, Cocaine, MDMA (Ecstacy), Marijuana     Comment: Former user    Sexual activity: Yes     Partners: Female   Lifestyle    Physical activity:     Days per week: Patient refused     Minutes per session: Patient refused    Stress: Not on file   Relationships    Social connections:     Talks on phone: Patient refused     Gets together: Patient refused     Attends Hoah[employer] service: Patient refused     Active member of club or organization: Patient refused     Attends meetings of clubs or organizations: Patient refused     Relationship status: Patient refused   Other Topics Concern    Not on file   Social History Narrative    ** Merged History Encounter **         ** Merged History Encounter **            Plan:   Patient declined assessment. States he does not have  any needs at this time.      Appointment made with: Primary Doctor No  "

## 2019-12-19 ENCOUNTER — HOSPITAL ENCOUNTER (EMERGENCY)
Facility: HOSPITAL | Age: 59
Discharge: HOME | End: 2019-12-19
Attending: EMERGENCY MEDICINE
Payer: MEDICAID

## 2019-12-19 VITALS
DIASTOLIC BLOOD PRESSURE: 82 MMHG | OXYGEN SATURATION: 98 % | SYSTOLIC BLOOD PRESSURE: 124 MMHG | BODY MASS INDEX: 21.22 KG/M2 | TEMPERATURE: 98 F | RESPIRATION RATE: 18 BRPM | HEIGHT: 68 IN | HEART RATE: 83 BPM | WEIGHT: 140 LBS

## 2019-12-19 DIAGNOSIS — S39.012A BACK STRAIN, INITIAL ENCOUNTER: Primary | ICD-10-CM

## 2019-12-19 PROCEDURE — 63600175 PHARM REV CODE 636 W HCPCS: Performed by: EMERGENCY MEDICINE

## 2019-12-19 PROCEDURE — 99284 EMERGENCY DEPT VISIT MOD MDM: CPT | Mod: 25

## 2019-12-19 PROCEDURE — 99284 PR EMERGENCY DEPT VISIT,LEVEL IV: ICD-10-PCS | Mod: ,,, | Performed by: EMERGENCY MEDICINE

## 2019-12-19 PROCEDURE — 96372 THER/PROPH/DIAG INJ SC/IM: CPT

## 2019-12-19 PROCEDURE — 99284 EMERGENCY DEPT VISIT MOD MDM: CPT | Mod: ,,, | Performed by: EMERGENCY MEDICINE

## 2019-12-19 RX ORDER — KETOROLAC TROMETHAMINE 30 MG/ML
30 INJECTION, SOLUTION INTRAMUSCULAR; INTRAVENOUS
Status: COMPLETED | OUTPATIENT
Start: 2019-12-19 | End: 2019-12-19

## 2019-12-19 RX ORDER — METHOCARBAMOL 500 MG/1
500 TABLET, FILM COATED ORAL 3 TIMES DAILY PRN
Qty: 9 TABLET | Refills: 0 | OUTPATIENT
Start: 2019-12-19 | End: 2019-12-20

## 2019-12-19 RX ADMIN — KETOROLAC TROMETHAMINE 30 MG: 30 INJECTION, SOLUTION INTRAMUSCULAR; INTRAVENOUS at 02:12

## 2019-12-19 NOTE — ED NOTES
"No IV. Patient states "I hardly feel any pain now." Vital signs stable, alert/oriented x4, unlabored respirations. Instructed no ETOH or driving due to prescription. Verbalized understanding of discharge instructions and follow. Patient ambulated independently with steady gait out of ER.     "

## 2019-12-19 NOTE — ED NOTES
Patient identifiers verified and correct for Mr Yusuf  C/C: Pain to right buttock, SEE NN  APPEARANCE: awake and alert in NAD.  SKIN: warm, dry and intact. No breakdown or bruising.  MUSCULOSKELETAL: Patient moving all extremities spontaneously, no obvious swelling or deformities noted. Ambulates with pain, .  RESPIRATORY: Denies shortness of breath.Respirations unlabored.   CARDIAC: Denies CP, 2+ distal pulses; no peripheral edema  ABDOMEN: S/ND/NT, Denies nausea  : voids spontaneously, denies difficulty  Neurologic: AAO x 4; follows commands equal strength in all extremities; denies numbness/tingling. Denies dizziness Positive gen weakness

## 2019-12-19 NOTE — ED TRIAGE NOTES
Patient states right buttock  pain , states pain worse with any pressure on buttock, seen in ED mult times for same. No OTC meds

## 2019-12-19 NOTE — ED PROVIDER NOTES
"Encounter Date: 12/19/2019    SCRIBE #1 NOTE: I, Milana Sosa, am scribing for, and in the presence of,  Allan Weller MD. I have scribed the entire note.       History     Chief Complaint   Patient presents with    Back Pain     Patient complains of non-traumatic back     Time patient was seen by the provider: 1:58 PM      The patient is a 59 y.o. male with co-morbidities including: seizures, who presents to the ED with a complaint of low back pain. He reports the pain is intermittent over the past few months and has been seen at this ED multiple times for similar complaint. Patient reports his pain is severe that is makes it difficult for him to ambulate. Patient reports he has not seen any other physician for this. He reports he is compliant with his seizure medication.     The history is provided by the patient.     Review of patient's allergies indicates:   Allergen Reactions    Codeine Other (See Comments)     As a child and teenager     Past Medical History:   Diagnosis Date    Ex-cigarette smoker     9 years ago    Seizures      History reviewed. No pertinent surgical history.  Family History   Problem Relation Age of Onset    No Known Problems Mother     No Known Problems Father      Social History     Tobacco Use    Smoking status: Former Smoker     Types: Cigarettes    Smokeless tobacco: Never Used    Tobacco comment: 9 years ago   Substance Use Topics    Alcohol use: Not Currently     Comment: Former abuser, last drink in Feb    Drug use: Not Currently     Types: "Crack" cocaine, Cocaine, MDMA (Ecstacy), Marijuana     Comment: Former user     Review of Systems   Constitutional: Negative for fever.   HENT: Negative for sore throat.    Respiratory: Negative for shortness of breath.    Cardiovascular: Negative for chest pain.   Gastrointestinal: Negative for nausea.   Genitourinary: Negative for dysuria.   Musculoskeletal: Positive for back pain.   Skin: Negative for rash.   Neurological: " Negative for weakness.   Hematological: Does not bruise/bleed easily.       Physical Exam     Initial Vitals [12/19/19 1350]   BP Pulse Resp Temp SpO2   124/82 83 18 97.6 °F (36.4 °C) 98 %      MAP       --         Physical Exam    Nursing note and vitals reviewed.  Constitutional: No distress.   Appears uncomfortable but in no acute distress   HENT:   Head: Normocephalic and atraumatic.   Eyes: Conjunctivae and EOM are normal. Pupils are equal, round, and reactive to light.   Neck: Normal range of motion. Neck supple.   Cardiovascular: Normal rate, regular rhythm, normal heart sounds and intact distal pulses.   Normal femoral pulse   Pulmonary/Chest: Breath sounds normal. No respiratory distress.   Abdominal: Soft. He exhibits no distension. There is no tenderness.   No pulsatile mass   Musculoskeletal: Normal range of motion. He exhibits tenderness.   Tenderness over the SI joint, no erythema. Full range of motion of his hip without pain. No swelling to legs with normal DP pulse   Neurological: He is alert and oriented to person, place, and time. He has normal strength. No cranial nerve deficit or sensory deficit.   Normal strength and sensation         ED Course   Procedures  Labs Reviewed - No data to display       Imaging Results          X-Ray Hip 2 View Right (Final result)  Result time 12/19/19 14:41:57    Final result by Jack Figueroa MD (12/19/19 14:41:57)                 Impression:      No displaced fracture-dislocation identified.      Electronically signed by: Jack Figueroa MD  Date:    12/19/2019  Time:    14:41             Narrative:    EXAMINATION:  XR HIP 2 VIEW RIGHT    CLINICAL HISTORY:  back pain;    TECHNIQUE:  AP view of the pelvis and frog leg lateral view of the right hip were performed.    COMPARISON:  None    FINDINGS:  Bones are well mineralized.  Overall alignment is within normal limits.  No displaced fracture, dislocation or destructive osseous process.  Degenerative changes of the  imaged lower lumbosacral spine, pubic symphysis and bilateral sacroiliac and hip joints noted.  Pelvic phleboliths noted.  No subcutaneous emphysema or radiodense retained foreign body.                                 Medical Decision Making:   History:   Old Medical Records: I decided to obtain old medical records.  Old Records Summarized: records from clinic visits.       <> Summary of Records: Seen before for back pain. He had a CT scan of his back two months ago that was benign.   Initial Assessment:   Patient with 6-9 months of back pain. Seems to be centered around the SI joint. He has been evaluated for this before in the ED and had a CT scan sone two months ago. I doubt this is infectious. He has no fever or IV drug use history. No neurological component to suggest cauda equina. I think hip fracture is unlikely but will check x-ray. Will give injection of Toradol and x-ray right hip.   Clinical Tests:   Radiological Study: Ordered and Reviewed  ED Management:  4:02 PM  Mr. Yusuf is resting comfortably and able to ambulate. Will discharge home.            Scribe Attestation:   Scribe #1: I performed the above scribed service and the documentation accurately describes the services I performed. I attest to the accuracy of the note.                          Clinical Impression:       ICD-10-CM ICD-9-CM   1. Back strain, initial encounter S39.012A 847.9         Disposition:   Disposition: Discharged  Condition: Stable                     Allan Weller MD  12/19/19 1269

## 2019-12-20 ENCOUNTER — HOSPITAL ENCOUNTER (EMERGENCY)
Facility: HOSPITAL | Age: 59
Discharge: HOME | End: 2019-12-20
Attending: EMERGENCY MEDICINE
Payer: MEDICAID

## 2019-12-20 VITALS
SYSTOLIC BLOOD PRESSURE: 122 MMHG | HEIGHT: 70 IN | OXYGEN SATURATION: 96 % | TEMPERATURE: 98 F | BODY MASS INDEX: 20.04 KG/M2 | RESPIRATION RATE: 18 BRPM | WEIGHT: 140 LBS | DIASTOLIC BLOOD PRESSURE: 80 MMHG | HEART RATE: 80 BPM

## 2019-12-20 DIAGNOSIS — G89.29 CHRONIC BILATERAL LOW BACK PAIN WITHOUT SCIATICA: ICD-10-CM

## 2019-12-20 DIAGNOSIS — M79.18 RIGHT BUTTOCK PAIN: Primary | ICD-10-CM

## 2019-12-20 DIAGNOSIS — M54.50 CHRONIC BILATERAL LOW BACK PAIN WITHOUT SCIATICA: ICD-10-CM

## 2019-12-20 PROCEDURE — 96372 THER/PROPH/DIAG INJ SC/IM: CPT

## 2019-12-20 PROCEDURE — 63600175 PHARM REV CODE 636 W HCPCS: Performed by: EMERGENCY MEDICINE

## 2019-12-20 PROCEDURE — 99284 PR EMERGENCY DEPT VISIT,LEVEL IV: ICD-10-PCS | Mod: ,,, | Performed by: EMERGENCY MEDICINE

## 2019-12-20 PROCEDURE — 99284 EMERGENCY DEPT VISIT MOD MDM: CPT | Mod: ,,, | Performed by: EMERGENCY MEDICINE

## 2019-12-20 PROCEDURE — 99284 EMERGENCY DEPT VISIT MOD MDM: CPT | Mod: 25

## 2019-12-20 RX ORDER — KETOROLAC TROMETHAMINE 30 MG/ML
10 INJECTION, SOLUTION INTRAMUSCULAR; INTRAVENOUS
Status: COMPLETED | OUTPATIENT
Start: 2019-12-20 | End: 2019-12-20

## 2019-12-20 RX ORDER — NAPROXEN 375 MG/1
375 TABLET ORAL 2 TIMES DAILY PRN
Qty: 20 TABLET | Refills: 0 | Status: SHIPPED | OUTPATIENT
Start: 2019-12-20 | End: 2020-08-10

## 2019-12-20 RX ORDER — CYCLOBENZAPRINE HCL 10 MG
10 TABLET ORAL 3 TIMES DAILY PRN
Qty: 15 TABLET | Refills: 0 | Status: SHIPPED | OUTPATIENT
Start: 2019-12-20 | End: 2019-12-25

## 2019-12-20 RX ADMIN — KETOROLAC TROMETHAMINE 10 MG: 30 INJECTION, SOLUTION INTRAMUSCULAR; INTRAVENOUS at 02:12

## 2019-12-20 NOTE — PLAN OF CARE
ED  (SW) set-up transportation for Pt to return Home. Transportation pick-up ordered for 4:30PM. Pt will be waiting in ED Waiting Room for pick-up.     SW discussed with Pt his outpatient options for Pain Management follow-up. SW informed Pt that [facility] physicians are not accepting new Medicaid clients at this time. SW provided Pt with appointment number for [facility] and advised him to call ASAP and follow-up with his Primary Care Clinic, [facility] on [address], while he waits for appointment. Pt expressed understanding and confirmed that he has a working cell phone. Pt gave SW [facility]. SW also advised Pt to call his Medicaid provider group and request medical transportation assistance in the event that [employer] application is denied. SW provided Pt with list of additional community services. P    ED MD informed of above.     Update 3:33 PM   PFC called SW and informed her that transportation will  Pt at 5:30PM.    SW informed Pt. Pt agreeable to wait. Pt filled prescriptions. SW gave him sandwich and juice. Pt expressed gratitude.      Adriana Rodriguez, [facility] [phone]

## 2019-12-20 NOTE — ED PROVIDER NOTES
"Encounter Date: 12/20/2019    SCRIBE #1 NOTE: I, Yahir Real, am scribing for, and in the presence of, Dr. Hannon.       History     Chief Complaint   Patient presents with    Back Pain     states he came here yesterday, no relief     59 y.o. Male presents with a chief complaint of back pain. Pt state he had a seizure Wednesday night. States it tightened up a muscle in his right buttocks. States its causing him a lot of pain to the point he cant walk. Pt has been dealing with this pain intermittently for months. Pt endorses numbness at the bottom of his feet as well. Pt was seen here yesterday and received pain medication which was ineffective.     The history is provided by the patient.     Review of patient's allergies indicates:   Allergen Reactions    Codeine Other (See Comments)     As a child and teenager     Past Medical History:   Diagnosis Date    Ex-cigarette smoker     9 years ago    Seizures      History reviewed. No pertinent surgical history.  Family History   Problem Relation Age of Onset    No Known Problems Mother     No Known Problems Father      Social History     Tobacco Use    Smoking status: Former Smoker     Types: Cigarettes    Smokeless tobacco: Never Used    Tobacco comment: 9 years ago   Substance Use Topics    Alcohol use: Not Currently     Comment: Former abuser, last drink in Feb    Drug use: Not Currently     Types: "Crack" cocaine, Cocaine, MDMA (Ecstacy), Marijuana     Comment: Former user     Review of Systems  General: No fever.  No chills.  Eyes: No visual changes.  Head: No headache.    Integument: No rashes or lesions.  Chest: No shortness of breath.  Cardiovascular: No chest pain.  Abdomen: No abdominal pain.  No nausea or vomiting.  Urinary: No abnormal urination.  Neurologic: No focal weakness.  No numbness.  Hematologic: No easy bruising.  Endocrine: No excessive thirst or urination.    Physical Exam     Initial Vitals [12/20/19 1336]   BP Pulse Resp Temp SpO2 " "  122/80 80 18 97.9 °F (36.6 °C) 96 %      MAP       --         Physical Exam    Nursing note and vitals reviewed.      Appearance: No acute distress.  Skin: No rashes seen.  Good turgor.  No abrasions.  No ecchymoses.  Eyes: No conjunctival injection.  ENT: Oropharynx clear.    Chest: Clear to auscultation bilaterally.  Good air movement.  No wheezes.  No rhonchi.  Cardiovascular: Regular rate and rhythm.  No murmurs. No gallops. No rubs.  Abdomen: Soft.  Not distended.  Nontender.  No guarding.  No rebound.  Musculoskeletal: Good range of motion all joints.  No deformities.  Neck supple.  No meningismus.  Neurologic: Motor intact.  Sensation intact.  Cerebellar intact.  Cranial nerves intact.  Mental Status:  Alert and oriented x 3.  Appropriate, conversant.    ED Course   Procedures  Labs Reviewed - No data to display       Imaging Results    None          Medical Decision Making:   History:   Old Medical Records: I decided to obtain old medical records.  Initial Assessment:    Xray reviewed from yesterday negative, CT reviewed from a month ago showed no acute findings. No neurologic findings on exam.Pt wants same pain shot as yesterday (as it "worked for 10 hours") and a different prescription. I recommend he follow up with pain management and outpatient back and spine. Will let pt talk to  as pt states he does not have reliable transportation.  Will rx nsaids and flexeril.  Stop robaxin.   has met with the patient to help him with clinic and transportation issues.              Scribe Attestation:   Scribe #1: I performed the above scribed service and the documentation accurately describes the services I performed. I attest to the accuracy of the note.                          Clinical Impression:       ICD-10-CM ICD-9-CM   1. Right buttock pain M79.18 729.1   2. Chronic bilateral low back pain without sciatica M54.5 724.2    G89.29 338.29                             Jassi Hannon, " MD  12/20/19 1546

## 2019-12-20 NOTE — ED NOTES
LOC: The patient is awake, alert, and oriented to place, time, situation. Affect is appropriate.  Speech is appropriate and clear.     APPEARANCE: Patient resting uncomfortably, reporting right buttock pain, states he is unable to stand or walk,  in no acute distress.  Patient is clean and well groomed.    SKIN: The skin is warm and dry; color consistent with ethnicity.  Patient has normal skin turgor and moist mucus membranes.  Skin intact; no breakdown or bruising noted.     MUSCULOSKELETAL: Patient moving upper and lower extremities without difficulty.  Denies weakness. States he is unable to stand or walk, increases pain.     RESPIRATORY: Airway is open and patent. Respirations spontaneous, even, easy, and non-labored.  Patient has a normal effort and rate.  No accessory muscle use noted. Denies cough.     CARDIAC:  No peripheral edema noted. No complaints of chest pain.      ABDOMEN: Soft and non tender to palpation.  No distention noted.     NEUROLOGIC: Eyes open spontaneously.  Behavior appropriate to situation.  Follows commands; facial expression symmetrical.  Purposeful motor response noted; normal sensation in all extremities.

## 2020-07-16 ENCOUNTER — HOSPITAL ENCOUNTER (EMERGENCY)
Facility: HOSPITAL | Age: 60
Discharge: HOME | End: 2020-07-16
Attending: EMERGENCY MEDICINE
Payer: MEDICAID

## 2020-07-16 VITALS
BODY MASS INDEX: 20.04 KG/M2 | WEIGHT: 140 LBS | SYSTOLIC BLOOD PRESSURE: 132 MMHG | HEART RATE: 77 BPM | OXYGEN SATURATION: 96 % | HEIGHT: 70 IN | TEMPERATURE: 98 F | DIASTOLIC BLOOD PRESSURE: 68 MMHG | RESPIRATION RATE: 18 BRPM

## 2020-07-16 DIAGNOSIS — Z76.0 MEDICATION REFILL: ICD-10-CM

## 2020-07-16 DIAGNOSIS — L73.9 FOLLICULITIS: Primary | ICD-10-CM

## 2020-07-16 PROCEDURE — 99284 PR EMERGENCY DEPT VISIT,LEVEL IV: ICD-10-PCS | Mod: ,,, | Performed by: EMERGENCY MEDICINE

## 2020-07-16 PROCEDURE — 99284 EMERGENCY DEPT VISIT MOD MDM: CPT | Mod: ,,, | Performed by: EMERGENCY MEDICINE

## 2020-07-16 PROCEDURE — 99284 EMERGENCY DEPT VISIT MOD MDM: CPT

## 2020-07-16 RX ORDER — LAMOTRIGINE 200 MG/1
200 TABLET ORAL 2 TIMES DAILY
Qty: 180 TABLET | Refills: 0 | Status: SHIPPED | OUTPATIENT
Start: 2020-07-16 | End: 2020-08-10 | Stop reason: SDUPTHER

## 2020-07-16 RX ORDER — SULFAMETHOXAZOLE AND TRIMETHOPRIM 800; 160 MG/1; MG/1
1 TABLET ORAL 2 TIMES DAILY
Qty: 14 TABLET | Refills: 0 | Status: SHIPPED | OUTPATIENT
Start: 2020-07-16 | End: 2020-07-23

## 2020-07-16 NOTE — ED NOTES
Terrell Yusuf, a 60 y.o. male presents to the ED w/ complaint of facial swelling that started yesterday.  Pt endorses excess tearing and vision changes when this occurred.  Pt denies CP, SOB, fever or chills, dysuria, or changes in bowel movements.     Triage note:  Chief Complaint   Patient presents with    Multiple symptoms     redness to face across nose, want med refill, yest eyes had eye drainage     Review of patient's allergies indicates:   Allergen Reactions    Codeine Other (See Comments)     As a child and teenager     Past Medical History:   Diagnosis Date    Ex-cigarette smoker     9 years ago    Seizures      Patient identifiers verified and correct for Terrell Yusuf.    LOC: The patient is awake, alert and aware of environment with an appropriate affect, the patient is oriented x 3 and speaking appropriately.  APPEARANCE: Patient resting comfortably and in no acute distress, patient is clean and well groomed, patient's clothing is properly fastened.  SKIN: Facial erythema and edema in the cheeks.  MUSCULOSKELETAL: Patient moving all extremities spontaneously, no obvious swelling or deformities noted.  RESPIRATORY: Airway is open and patent, respirations are spontaneous, patient has a normal effort and rate, no accessory muscle use noted.  CARDIAC: Pt denies CP.  ABDOMEN: Soft and non tender to palpation, no distention noted.  NEUROLOGIC: Eyes open spontaneously, behavior appropriate to situation, follows commands.
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
## 2020-07-16 NOTE — ED NOTES
Patient discharged home  Discharge instructions and medications given  Patient verbalizes understanding   Patient denies pain, chest pain and shortness of breath   All belongings sent home with patient     Pt understands that he will be notified of covid results

## 2020-08-10 ENCOUNTER — OFFICE VISIT (OUTPATIENT)
Dept: FAMILY MEDICINE | Facility: HOSPITAL | Age: 60
End: 2020-08-10
Attending: FAMILY MEDICINE
Payer: MEDICAID

## 2020-08-10 VITALS
HEIGHT: 70 IN | DIASTOLIC BLOOD PRESSURE: 76 MMHG | BODY MASS INDEX: 24.34 KG/M2 | HEART RATE: 86 BPM | WEIGHT: 170 LBS | SYSTOLIC BLOOD PRESSURE: 122 MMHG

## 2020-08-10 DIAGNOSIS — G40.909 SEIZURE DISORDER: Primary | ICD-10-CM

## 2020-08-10 PROCEDURE — 99213 OFFICE O/P EST LOW 20 MIN: CPT | Performed by: STUDENT IN AN ORGANIZED HEALTH CARE EDUCATION/TRAINING PROGRAM

## 2020-08-10 RX ORDER — LAMOTRIGINE 200 MG/1
200 TABLET ORAL 2 TIMES DAILY
Qty: 180 TABLET | Refills: 3 | Status: SHIPPED | OUTPATIENT
Start: 2020-08-10 | End: 2021-08-25 | Stop reason: SDUPTHER

## 2020-08-10 RX ORDER — LEVETIRACETAM 500 MG/1
1500 TABLET ORAL 2 TIMES DAILY
Qty: 540 TABLET | Refills: 3 | Status: SHIPPED | OUTPATIENT
Start: 2020-08-10 | End: 2020-08-10

## 2020-08-10 RX ORDER — LAMOTRIGINE 200 MG/1
200 TABLET ORAL 2 TIMES DAILY
Qty: 180 TABLET | Refills: 3 | Status: SHIPPED | OUTPATIENT
Start: 2020-08-10 | End: 2020-08-10

## 2020-08-10 RX ORDER — LEVETIRACETAM 500 MG/1
1500 TABLET ORAL 2 TIMES DAILY
Qty: 540 TABLET | Refills: 3 | Status: SHIPPED | OUTPATIENT
Start: 2020-08-10 | End: 2021-08-26 | Stop reason: SDUPTHER
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
## 2021-04-14 ENCOUNTER — HOSPITAL ENCOUNTER (EMERGENCY)
Facility: HOSPITAL | Age: 61
Discharge: HOME | End: 2021-04-14
Attending: EMERGENCY MEDICINE
Payer: MEDICAID

## 2021-04-14 VITALS
BODY MASS INDEX: 24.34 KG/M2 | HEIGHT: 70 IN | HEART RATE: 71 BPM | DIASTOLIC BLOOD PRESSURE: 72 MMHG | RESPIRATION RATE: 16 BRPM | OXYGEN SATURATION: 97 % | TEMPERATURE: 98 F | SYSTOLIC BLOOD PRESSURE: 125 MMHG | WEIGHT: 170 LBS

## 2021-04-14 DIAGNOSIS — S29.012A STRAIN OF RHOMBOID MUSCLE, INITIAL ENCOUNTER: Primary | ICD-10-CM

## 2021-04-14 PROCEDURE — 99282 EMERGENCY DEPT VISIT SF MDM: CPT

## 2021-04-14 PROCEDURE — 99282 PR EMERGENCY DEPT VISIT,LEVEL II: ICD-10-PCS | Mod: ,,, | Performed by: EMERGENCY MEDICINE

## 2021-04-14 PROCEDURE — 99282 EMERGENCY DEPT VISIT SF MDM: CPT | Mod: ,,, | Performed by: EMERGENCY MEDICINE

## 2021-06-10 ENCOUNTER — HOSPITAL ENCOUNTER (EMERGENCY)
Facility: HOSPITAL | Age: 61
Discharge: HOME | End: 2021-06-10
Attending: EMERGENCY MEDICINE
Payer: MEDICAID

## 2021-06-10 VITALS
OXYGEN SATURATION: 96 % | SYSTOLIC BLOOD PRESSURE: 114 MMHG | DIASTOLIC BLOOD PRESSURE: 71 MMHG | WEIGHT: 170 LBS | HEIGHT: 70 IN | RESPIRATION RATE: 16 BRPM | HEART RATE: 64 BPM | TEMPERATURE: 98 F | BODY MASS INDEX: 24.34 KG/M2

## 2021-06-10 DIAGNOSIS — G40.909 RECURRENT SEIZURES: Primary | ICD-10-CM

## 2021-06-10 DIAGNOSIS — M54.50 RIGHT LOW BACK PAIN: ICD-10-CM

## 2021-06-10 DIAGNOSIS — R45.1 AGITATION: ICD-10-CM

## 2021-06-10 LAB
ALBUMIN SERPL BCP-MCNC: 3.9 G/DL (ref 3.5–5.2)
ALP SERPL-CCNC: 133 U/L (ref 55–135)
ALT SERPL W/O P-5'-P-CCNC: 20 U/L (ref 10–44)
ANION GAP SERPL CALC-SCNC: 9 MMOL/L (ref 8–16)
AST SERPL-CCNC: 22 U/L (ref 10–40)
BASOPHILS # BLD AUTO: 0.06 K/UL (ref 0–0.2)
BASOPHILS NFR BLD: 0.6 % (ref 0–1.9)
BILIRUB SERPL-MCNC: 0.5 MG/DL (ref 0.1–1)
BUN SERPL-MCNC: 21 MG/DL (ref 8–23)
CALCIUM SERPL-MCNC: 9.8 MG/DL (ref 8.7–10.5)
CHLORIDE SERPL-SCNC: 104 MMOL/L (ref 95–110)
CO2 SERPL-SCNC: 26 MMOL/L (ref 23–29)
CREAT SERPL-MCNC: 1 MG/DL (ref 0.5–1.4)
DIFFERENTIAL METHOD: ABNORMAL
EOSINOPHIL # BLD AUTO: 0.1 K/UL (ref 0–0.5)
EOSINOPHIL NFR BLD: 1 % (ref 0–8)
ERYTHROCYTE [DISTWIDTH] IN BLOOD BY AUTOMATED COUNT: 12.7 % (ref 11.5–14.5)
EST. GFR  (AFRICAN AMERICAN): >60 ML/MIN/1.73 M^2
EST. GFR  (NON AFRICAN AMERICAN): >60 ML/MIN/1.73 M^2
GLUCOSE SERPL-MCNC: 83 MG/DL (ref 70–110)
HCT VFR BLD AUTO: 37.4 % (ref 40–54)
HGB BLD-MCNC: 12.4 G/DL (ref 14–18)
IMM GRANULOCYTES # BLD AUTO: 0.03 K/UL (ref 0–0.04)
IMM GRANULOCYTES NFR BLD AUTO: 0.3 % (ref 0–0.5)
LACTATE SERPL-SCNC: 0.6 MMOL/L (ref 0.5–2.2)
LYMPHOCYTES # BLD AUTO: 1.4 K/UL (ref 1–4.8)
LYMPHOCYTES NFR BLD: 13.4 % (ref 18–48)
MCH RBC QN AUTO: 31.2 PG (ref 27–31)
MCHC RBC AUTO-ENTMCNC: 33.2 G/DL (ref 32–36)
MCV RBC AUTO: 94 FL (ref 82–98)
MONOCYTES # BLD AUTO: 0.9 K/UL (ref 0.3–1)
MONOCYTES NFR BLD: 8.7 % (ref 4–15)
NEUTROPHILS # BLD AUTO: 8.2 K/UL (ref 1.8–7.7)
NEUTROPHILS NFR BLD: 76 % (ref 38–73)
NRBC BLD-RTO: 0 /100 WBC
PLATELET # BLD AUTO: 261 K/UL (ref 150–450)
PMV BLD AUTO: 10.1 FL (ref 9.2–12.9)
POTASSIUM SERPL-SCNC: 4.8 MMOL/L (ref 3.5–5.1)
PROT SERPL-MCNC: 7.2 G/DL (ref 6–8.4)
RBC # BLD AUTO: 3.97 M/UL (ref 4.6–6.2)
SODIUM SERPL-SCNC: 139 MMOL/L (ref 136–145)
WBC # BLD AUTO: 10.76 K/UL (ref 3.9–12.7)

## 2021-06-10 PROCEDURE — 86703 HIV-1/HIV-2 1 RESULT ANTBDY: CPT | Performed by: EMERGENCY MEDICINE

## 2021-06-10 PROCEDURE — 80175 DRUG SCREEN QUAN LAMOTRIGINE: CPT | Performed by: PHYSICIAN ASSISTANT

## 2021-06-10 PROCEDURE — 85025 COMPLETE CBC W/AUTO DIFF WBC: CPT | Performed by: PHYSICIAN ASSISTANT

## 2021-06-10 PROCEDURE — 99285 EMERGENCY DEPT VISIT HI MDM: CPT | Mod: ,,, | Performed by: EMERGENCY MEDICINE

## 2021-06-10 PROCEDURE — 80053 COMPREHEN METABOLIC PANEL: CPT | Performed by: PHYSICIAN ASSISTANT

## 2021-06-10 PROCEDURE — 83605 ASSAY OF LACTIC ACID: CPT | Performed by: PHYSICIAN ASSISTANT

## 2021-06-10 PROCEDURE — 99283 EMERGENCY DEPT VISIT LOW MDM: CPT

## 2021-06-10 PROCEDURE — 80177 DRUG SCRN QUAN LEVETIRACETAM: CPT | Performed by: PHYSICIAN ASSISTANT

## 2021-06-10 PROCEDURE — 99285 PR EMERGENCY DEPT VISIT,LEVEL V: ICD-10-PCS | Mod: ,,, | Performed by: EMERGENCY MEDICINE

## 2021-06-10 PROCEDURE — 86803 HEPATITIS C AB TEST: CPT | Performed by: EMERGENCY MEDICINE

## 2021-06-10 PROCEDURE — 36000 PLACE NEEDLE IN VEIN: CPT

## 2021-06-11 LAB
HCV AB SERPL QL IA: NEGATIVE
HIV 1+2 AB+HIV1 P24 AG SERPL QL IA: NEGATIVE

## 2021-06-12 ENCOUNTER — HOSPITAL ENCOUNTER (EMERGENCY)
Facility: HOSPITAL | Age: 61
Discharge: HOME | End: 2021-06-12
Attending: EMERGENCY MEDICINE
Payer: MEDICAID

## 2021-06-12 VITALS
RESPIRATION RATE: 16 BRPM | SYSTOLIC BLOOD PRESSURE: 101 MMHG | HEIGHT: 70 IN | HEART RATE: 97 BPM | BODY MASS INDEX: 24.34 KG/M2 | TEMPERATURE: 99 F | WEIGHT: 170 LBS | DIASTOLIC BLOOD PRESSURE: 58 MMHG | OXYGEN SATURATION: 98 %

## 2021-06-12 DIAGNOSIS — S20.211A CONTUSION OF RIB ON RIGHT SIDE, INITIAL ENCOUNTER: ICD-10-CM
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
## 2021-06-12 DIAGNOSIS — S39.012A BACK STRAIN, INITIAL ENCOUNTER: ICD-10-CM

## 2021-06-12 DIAGNOSIS — G40.909 RECURRENT SEIZURES: ICD-10-CM

## 2021-06-12 PROCEDURE — 25000003 PHARM REV CODE 250: Performed by: EMERGENCY MEDICINE

## 2021-06-12 PROCEDURE — 99284 EMERGENCY DEPT VISIT MOD MDM: CPT | Mod: 25

## 2021-06-12 RX ORDER — LIDOCAINE 50 MG/G
1 PATCH TOPICAL DAILY
Qty: 5 PATCH | Refills: 0 | Status: SHIPPED | OUTPATIENT
Start: 2021-06-12 | End: 2021-06-30

## 2021-06-12 RX ORDER — NAPROXEN 500 MG/1
500 TABLET ORAL 2 TIMES DAILY WITH MEALS
Qty: 30 TABLET | Refills: 0 | Status: SHIPPED | OUTPATIENT
Start: 2021-06-12 | End: 2021-06-28 | Stop reason: SDUPTHER

## 2021-06-12 RX ORDER — LIDOCAINE 50 MG/G
1 PATCH TOPICAL
Status: DISCONTINUED | OUTPATIENT
Start: 2021-06-12 | End: 2021-06-12 | Stop reason: HOSPADM

## 2021-06-12 RX ADMIN — LIDOCAINE 1 PATCH: 50 PATCH TOPICAL at 01:06

## 2021-06-14 LAB
LAMOTRIGINE SERPL-MCNC: 8.1 UG/ML (ref 2–15)
LEVETIRACETAM SERPL-MCNC: 63.7 UG/ML (ref 3–60)

## 2021-06-23 ENCOUNTER — HOSPITAL ENCOUNTER (EMERGENCY)
Facility: HOSPITAL | Age: 61
Discharge: HOME | End: 2021-06-23
Attending: EMERGENCY MEDICINE
Payer: MEDICAID

## 2021-06-23 VITALS
RESPIRATION RATE: 16 BRPM | HEIGHT: 70 IN | WEIGHT: 170 LBS | TEMPERATURE: 98 F | SYSTOLIC BLOOD PRESSURE: 106 MMHG | BODY MASS INDEX: 24.34 KG/M2 | OXYGEN SATURATION: 98 % | DIASTOLIC BLOOD PRESSURE: 64 MMHG | HEART RATE: 67 BPM

## 2021-06-23 DIAGNOSIS — M54.50 ACUTE RIGHT-SIDED LOW BACK PAIN WITHOUT SCIATICA: Primary | ICD-10-CM

## 2021-06-23 PROCEDURE — 99283 EMERGENCY DEPT VISIT LOW MDM: CPT

## 2021-06-23 PROCEDURE — 25000003 PHARM REV CODE 250: Performed by: PHYSICIAN ASSISTANT

## 2021-06-23 RX ORDER — METHOCARBAMOL 500 MG/1
500 TABLET, FILM COATED ORAL 3 TIMES DAILY
Qty: 15 TABLET | Refills: 0 | Status: SHIPPED | OUTPATIENT
Start: 2021-06-23 | End: 2021-06-28 | Stop reason: SDUPTHER

## 2021-06-23 RX ORDER — LIDOCAINE 50 MG/G
1 PATCH TOPICAL ONCE
Status: DISCONTINUED | OUTPATIENT
Start: 2021-06-23 | End: 2021-06-23 | Stop reason: HOSPADM

## 2021-06-23 RX ADMIN — LIDOCAINE 1 PATCH: 50 PATCH TOPICAL at 03:06

## 2021-06-26 ENCOUNTER — HOSPITAL ENCOUNTER (EMERGENCY)
Facility: HOSPITAL | Age: 61
Discharge: HOME | End: 2021-06-26
Attending: EMERGENCY MEDICINE
Payer: MEDICAID

## 2021-06-26 VITALS
RESPIRATION RATE: 20 BRPM | TEMPERATURE: 98 F | HEART RATE: 75 BPM | WEIGHT: 170 LBS | SYSTOLIC BLOOD PRESSURE: 112 MMHG | DIASTOLIC BLOOD PRESSURE: 63 MMHG | BODY MASS INDEX: 24.39 KG/M2 | OXYGEN SATURATION: 97 %

## 2021-06-26 DIAGNOSIS — M54.50 ACUTE RIGHT-SIDED LOW BACK PAIN WITHOUT SCIATICA: Primary | ICD-10-CM

## 2021-06-26 PROCEDURE — 25000003 PHARM REV CODE 250: Performed by: NURSE PRACTITIONER

## 2021-06-26 PROCEDURE — 99283 EMERGENCY DEPT VISIT LOW MDM: CPT | Mod: 25

## 2021-06-26 RX ORDER — METHOCARBAMOL 500 MG/1
500 TABLET, FILM COATED ORAL 3 TIMES DAILY
Qty: 9 TABLET | Refills: 0 | Status: SHIPPED | OUTPATIENT
Start: 2021-06-26 | End: 2021-06-26 | Stop reason: SDUPTHER

## 2021-06-26 RX ORDER — METHOCARBAMOL 500 MG/1
500 TABLET, FILM COATED ORAL
Status: COMPLETED | OUTPATIENT
Start: 2021-06-26 | End: 2021-06-26

## 2021-06-26 RX ORDER — METHOCARBAMOL 500 MG/1
500 TABLET, FILM COATED ORAL 3 TIMES DAILY
Qty: 9 TABLET | Refills: 0 | Status: SHIPPED | OUTPATIENT
Start: 2021-06-26 | End: 2021-06-30

## 2021-06-26 RX ORDER — KETOROLAC TROMETHAMINE 10 MG/1
10 TABLET, FILM COATED ORAL
Status: COMPLETED | OUTPATIENT
Start: 2021-06-26 | End: 2021-06-26

## 2021-06-26 RX ADMIN — KETOROLAC TROMETHAMINE 10 MG: 10 TABLET, FILM COATED ORAL at 03:06

## 2021-06-26 RX ADMIN — METHOCARBAMOL TABLETS 500 MG: 500 TABLET, COATED ORAL at 03:06

## 2021-06-28 ENCOUNTER — OFFICE VISIT (OUTPATIENT)
Dept: FAMILY MEDICINE | Facility: HOSPITAL | Age: 61
End: 2021-06-28
Payer: MEDICAID

## 2021-06-28 VITALS
DIASTOLIC BLOOD PRESSURE: 57 MMHG | BODY MASS INDEX: 24.94 KG/M2 | SYSTOLIC BLOOD PRESSURE: 91 MMHG | HEIGHT: 70 IN | HEART RATE: 84 BPM | WEIGHT: 174.19 LBS

## 2021-06-28 DIAGNOSIS — G40.909 SEIZURE DISORDER: Primary | ICD-10-CM

## 2021-06-28 DIAGNOSIS — S39.012A BACK STRAIN, INITIAL ENCOUNTER: ICD-10-CM

## 2021-06-28 PROCEDURE — 99214 OFFICE O/P EST MOD 30 MIN: CPT | Performed by: STUDENT IN AN ORGANIZED HEALTH CARE EDUCATION/TRAINING PROGRAM

## 2021-06-28 RX ORDER — DEXTROMETHORPHAN HYDROBROMIDE, GUAIFENESIN 5; 100 MG/5ML; MG/5ML
650 LIQUID ORAL EVERY 8 HOURS
Qty: 30 TABLET | Refills: 0 | Status: SHIPPED | OUTPATIENT
Start: 2021-06-28 | End: 2021-07-08

## 2021-06-28 RX ORDER — NAPROXEN 500 MG/1
500 TABLET ORAL 2 TIMES DAILY WITH MEALS
Qty: 30 TABLET | Refills: 0 | Status: SHIPPED | OUTPATIENT
Start: 2021-06-28 | End: 2021-07-08

## 2021-06-28 RX ORDER — CYCLOBENZAPRINE HCL 10 MG
10 TABLET ORAL 3 TIMES DAILY PRN
Qty: 15 TABLET | Refills: 0 | Status: SHIPPED | OUTPATIENT
Start: 2021-06-28 | End: 2021-06-30 | Stop reason: SDUPTHER

## 2021-06-30 ENCOUNTER — OFFICE VISIT (OUTPATIENT)
Dept: NEUROLOGY | Facility: CLINIC | Age: 61
End: 2021-06-30
Payer: MEDICAID

## 2021-06-30 VITALS
RESPIRATION RATE: 16 BRPM | WEIGHT: 174.19 LBS | HEART RATE: 72 BPM | TEMPERATURE: 98 F | BODY MASS INDEX: 24.94 KG/M2 | HEIGHT: 70 IN | SYSTOLIC BLOOD PRESSURE: 98 MMHG | DIASTOLIC BLOOD PRESSURE: 68 MMHG

## 2021-06-30 DIAGNOSIS — M54.50 ACUTE RIGHT-SIDED LOW BACK PAIN WITHOUT SCIATICA: ICD-10-CM

## 2021-06-30 DIAGNOSIS — G40.909 SEIZURE DISORDER: ICD-10-CM

## 2021-06-30 DIAGNOSIS — G40.219 PARTIAL SYMPTOMATIC EPILEPSY WITH COMPLEX PARTIAL SEIZURES, INTRACTABLE, WITHOUT STATUS EPILEPTICUS: Primary | ICD-10-CM

## 2021-06-30 PROCEDURE — 99204 PR OFFICE/OUTPT VISIT, NEW, LEVL IV, 45-59 MIN: ICD-10-PCS | Mod: S$PBB,,, | Performed by: PHYSICIAN ASSISTANT

## 2021-06-30 PROCEDURE — 99999 PR PBB SHADOW E&M-EST. PATIENT-LVL IV: ICD-10-PCS | Mod: PBBFAC,,, | Performed by: PHYSICIAN ASSISTANT

## 2021-06-30 PROCEDURE — 99214 OFFICE O/P EST MOD 30 MIN: CPT | Mod: PBBFAC | Performed by: PHYSICIAN ASSISTANT

## 2021-06-30 PROCEDURE — 99999 PR PBB SHADOW E&M-EST. PATIENT-LVL IV: CPT | Mod: PBBFAC,,, | Performed by: PHYSICIAN ASSISTANT

## 2021-06-30 PROCEDURE — 99204 OFFICE O/P NEW MOD 45 MIN: CPT | Mod: S$PBB,,, | Performed by: PHYSICIAN ASSISTANT

## 2021-06-30 RX ORDER — LACOSAMIDE 100 MG/1
TABLET ORAL
Qty: 60 EACH | Refills: 5 | Status: SHIPPED | OUTPATIENT
Start: 2021-06-30 | End: 2021-08-26 | Stop reason: SDUPTHER

## 2021-06-30 RX ORDER — CYCLOBENZAPRINE HCL 10 MG
TABLET ORAL
Qty: 60 TABLET | Refills: 0 | Status: SHIPPED | OUTPATIENT
Start: 2021-06-30

## 2021-08-25 DIAGNOSIS — G40.909 SEIZURE DISORDER: ICD-10-CM

## 2021-08-25 RX ORDER — LAMOTRIGINE 200 MG/1
200 TABLET ORAL 2 TIMES DAILY
Qty: 180 TABLET | Refills: 3 | Status: SHIPPED | OUTPATIENT
Start: 2021-08-25 | End: 2021-08-26 | Stop reason: SDUPTHER

## 2021-08-26 DIAGNOSIS — G40.909 SEIZURE DISORDER: ICD-10-CM

## 2021-08-26 DIAGNOSIS — G40.219 PARTIAL SYMPTOMATIC EPILEPSY WITH COMPLEX PARTIAL SEIZURES, INTRACTABLE, WITHOUT STATUS EPILEPTICUS: ICD-10-CM

## 2021-08-26 RX ORDER — LACOSAMIDE 100 MG/1
TABLET ORAL
Qty: 60 EACH | Refills: 5 | Status: SHIPPED | OUTPATIENT
Start: 2021-08-26 | End: 2021-08-26

## 2021-08-26 RX ORDER — LAMOTRIGINE 200 MG/1
200 TABLET ORAL 2 TIMES DAILY
Qty: 180 TABLET | Refills: 3 | Status: SHIPPED | OUTPATIENT
Start: 2021-08-26 | End: 2021-09-13 | Stop reason: SDUPTHER

## 2021-08-26 RX ORDER — LACOSAMIDE 100 MG/1
TABLET ORAL
Qty: 60 EACH | Refills: 5 | Status: SHIPPED | OUTPATIENT
Start: 2021-08-26 | End: 2021-09-13 | Stop reason: SDUPTHER

## 2021-08-26 RX ORDER — LEVETIRACETAM 500 MG/1
1500 TABLET ORAL 2 TIMES DAILY
Qty: 540 TABLET | Refills: 3 | Status: SHIPPED | OUTPATIENT
Start: 2021-08-26 | End: 2021-09-13 | Stop reason: SDUPTHER

## 2021-09-13 ENCOUNTER — OFFICE VISIT (OUTPATIENT)
Dept: FAMILY MEDICINE | Facility: HOSPITAL | Age: 61
End: 2021-09-13
Payer: MEDICAID

## 2021-09-13 VITALS
HEART RATE: 87 BPM | DIASTOLIC BLOOD PRESSURE: 72 MMHG | WEIGHT: 171.94 LBS | BODY MASS INDEX: 24.67 KG/M2 | SYSTOLIC BLOOD PRESSURE: 125 MMHG

## 2021-09-13 DIAGNOSIS — M94.0 COSTOCHONDRITIS: ICD-10-CM

## 2021-09-13 DIAGNOSIS — G40.219 PARTIAL SYMPTOMATIC EPILEPSY WITH COMPLEX PARTIAL SEIZURES, INTRACTABLE, WITHOUT STATUS EPILEPTICUS: Primary | ICD-10-CM

## 2021-09-13 PROCEDURE — 99213 OFFICE O/P EST LOW 20 MIN: CPT | Performed by: STUDENT IN AN ORGANIZED HEALTH CARE EDUCATION/TRAINING PROGRAM

## 2021-09-13 RX ORDER — NAPROXEN 500 MG/1
500 TABLET ORAL 2 TIMES DAILY
Qty: 60 TABLET | Refills: 0 | Status: SHIPPED | OUTPATIENT
Start: 2021-09-13 | End: 2021-10-13

## 2021-09-13 RX ORDER — LEVETIRACETAM 500 MG/1
1500 TABLET ORAL 2 TIMES DAILY
Qty: 540 TABLET | Refills: 3 | Status: SHIPPED | OUTPATIENT
Start: 2021-09-13 | End: 2022-09-13

## 2021-09-13 RX ORDER — LAMOTRIGINE 200 MG/1
200 TABLET ORAL 2 TIMES DAILY
Qty: 180 TABLET | Refills: 3 | Status: SHIPPED | OUTPATIENT
Start: 2021-09-13 | End: 2022-09-13

## 2021-09-13 RX ORDER — LACOSAMIDE 100 MG/1
100 TABLET ORAL EVERY 12 HOURS
Qty: 180 TABLET | Refills: 3 | Status: SHIPPED | OUTPATIENT
Start: 2021-09-13 | End: 2021-09-13

## 2021-09-13 RX ORDER — LACOSAMIDE 100 MG/1
100 TABLET ORAL EVERY 12 HOURS
Qty: 180 TABLET | Refills: 3 | Status: SHIPPED | OUTPATIENT
Start: 2021-09-13 | End: 2022-09-13

## 2021-09-13 RX ORDER — NAPROXEN 500 MG/1
500 TABLET ORAL 2 TIMES DAILY
COMMUNITY
Start: 2021-07-12 | End: 2021-09-13 | Stop reason: SDUPTHER

## 2021-09-15 ENCOUNTER — TELEPHONE (OUTPATIENT)
Dept: FAMILY MEDICINE | Facility: HOSPITAL | Age: 61
End: 2021-09-15

## 2022-02-04 ENCOUNTER — HOSPITAL ENCOUNTER (EMERGENCY)
Facility: HOSPITAL | Age: 62
Discharge: STILL ADMITTED OR ADMINISTRATIVE | End: 2022-02-04
Attending: EMERGENCY MEDICINE
Payer: MEDICAID

## 2022-02-04 VITALS
WEIGHT: 175 LBS | OXYGEN SATURATION: 98 % | SYSTOLIC BLOOD PRESSURE: 113 MMHG | RESPIRATION RATE: 14 BRPM | HEART RATE: 68 BPM | BODY MASS INDEX: 25.05 KG/M2 | TEMPERATURE: 98 F | DIASTOLIC BLOOD PRESSURE: 68 MMHG | HEIGHT: 70 IN

## 2022-02-04 DIAGNOSIS — T21.25XA BURN OF BUTTOCK, SECOND DEGREE, INITIAL ENCOUNTER: ICD-10-CM

## 2022-02-04 DIAGNOSIS — Z23 NEED FOR TD VACCINE: ICD-10-CM

## 2022-02-04 DIAGNOSIS — G40.909 RECURRENT SEIZURES: ICD-10-CM

## 2022-02-04 DIAGNOSIS — T21.26XA: Primary | ICD-10-CM

## 2022-02-04 PROCEDURE — 16020 DRESS/DEBRID P-THICK BURN S: CPT

## 2022-02-04 PROCEDURE — 99284 PR EMERGENCY DEPT VISIT,LEVEL IV: ICD-10-PCS | Mod: 25,CS,, | Performed by: PHYSICIAN ASSISTANT

## 2022-02-04 PROCEDURE — 63600175 PHARM REV CODE 636 W HCPCS: Performed by: PHYSICIAN ASSISTANT

## 2022-02-04 PROCEDURE — 16020 DRESS/DEBRID P-THICK BURN S: CPT | Mod: ,,, | Performed by: PHYSICIAN ASSISTANT

## 2022-02-04 PROCEDURE — 96374 THER/PROPH/DIAG INJ IV PUSH: CPT

## 2022-02-04 PROCEDURE — 99285 EMERGENCY DEPT VISIT HI MDM: CPT | Mod: 25

## 2022-02-04 PROCEDURE — 16020 PR DRESS/DEBRID SMALL BURN 2 ANES: ICD-10-PCS | Mod: ,,, | Performed by: PHYSICIAN ASSISTANT

## 2022-02-04 PROCEDURE — 90471 IMMUNIZATION ADMIN: CPT | Performed by: PHYSICIAN ASSISTANT

## 2022-02-04 PROCEDURE — 25000003 PHARM REV CODE 250: Performed by: PHYSICIAN ASSISTANT

## 2022-02-04 PROCEDURE — 90715 TDAP VACCINE 7 YRS/> IM: CPT | Performed by: PHYSICIAN ASSISTANT

## 2022-02-04 PROCEDURE — 99284 EMERGENCY DEPT VISIT MOD MDM: CPT | Mod: 25,CS,, | Performed by: PHYSICIAN ASSISTANT

## 2022-02-04 RX ORDER — LEVETIRACETAM 500 MG/5ML
2000 INJECTION, SOLUTION, CONCENTRATE INTRAVENOUS
Status: COMPLETED | OUTPATIENT
Start: 2022-02-04 | End: 2022-02-04

## 2022-02-04 RX ORDER — SILVER SULFADIAZINE 10 G/1000G
1 CREAM TOPICAL
Status: DISCONTINUED | OUTPATIENT
Start: 2022-02-04 | End: 2022-02-04

## 2022-02-04 RX ADMIN — TETANUS TOXOID, REDUCED DIPHTHERIA TOXOID AND ACELLULAR PERTUSSIS VACCINE, ADSORBED 0.5 ML: 5; 2.5; 8; 8; 2.5 SUSPENSION INTRAMUSCULAR at 05:02

## 2022-02-04 RX ADMIN — LEVETIRACETAM 2000 MG: 500 INJECTION, SOLUTION INTRAVENOUS at 05:02

## 2022-02-04 RX ADMIN — BACITRACIN, NEOMYCIN, POLYMYXIN B 1 EACH: 400; 3.5; 5 OINTMENT TOPICAL at 05:02
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
## 2022-02-14 ENCOUNTER — HOSPITAL ENCOUNTER (EMERGENCY)
Facility: HOSPITAL | Age: 62
Discharge: HOME | End: 2022-02-14
Attending: EMERGENCY MEDICINE
Payer: MEDICAID

## 2022-02-14 VITALS
DIASTOLIC BLOOD PRESSURE: 82 MMHG | WEIGHT: 175 LBS | HEIGHT: 70 IN | OXYGEN SATURATION: 97 % | BODY MASS INDEX: 25.05 KG/M2 | RESPIRATION RATE: 20 BRPM | TEMPERATURE: 98 F | HEART RATE: 80 BPM | SYSTOLIC BLOOD PRESSURE: 134 MMHG

## 2022-02-14 DIAGNOSIS — T30.0 BURN: Primary | ICD-10-CM

## 2022-02-14 PROCEDURE — 99284 EMERGENCY DEPT VISIT MOD MDM: CPT | Mod: ,,, | Performed by: EMERGENCY MEDICINE

## 2022-02-14 PROCEDURE — 25000003 PHARM REV CODE 250: Performed by: EMERGENCY MEDICINE

## 2022-02-14 PROCEDURE — 99284 PR EMERGENCY DEPT VISIT,LEVEL IV: ICD-10-PCS | Mod: ,,, | Performed by: EMERGENCY MEDICINE

## 2022-02-14 PROCEDURE — 99283 EMERGENCY DEPT VISIT LOW MDM: CPT

## 2022-02-14 RX ORDER — OXYCODONE HYDROCHLORIDE 5 MG/1
5 TABLET ORAL
Status: COMPLETED | OUTPATIENT
Start: 2022-02-14 | End: 2022-02-14

## 2022-02-14 RX ADMIN — OXYCODONE 5 MG: 5 TABLET ORAL at 09:02
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
## 2023-04-02 NOTE — PROCEDURES
DATE OF STUDY:  11/28/2018    EEG NUMBER:  [id number].    REQUESTED BY:  Dr. Orville Tovar.    LOCATION OF SERVICE:  [place] [address].    EPILEPSY MONITORING UNIT  EEG AND VIDEO TELEMETRY REPORT    METHODOLOGY:  Electroencephalographic (EEG) is recorded with electrodes placed   according to the International 10-20 placement system.  Thirty Two (32) channels   of digital signal, including T1 and T2 electrodes, are simultaneously recorded   from the scalp and may also include EKG, EMG and/or eye movement monitors.    Recording band pass was 0.1 to 512 Hz.  Digital video recording of the patient   is simultaneously recorded with the EEG.  The patient is instructed to report   clinical symptoms which may occur during the recording session.  EEG and video   recording are stored and archived in digital format.  Activation procedures,   which include photic stimulation, hyperventilation and instructing patients to   perform simple tasks, are done in selected patients.    The EEG is displayed on a monitor screen and can be reformatted into different   montages for evaluation.  The entire recoding is submitted for computer-assisted   analysis to detect spike and electrographic seizure activity.  The entire   recording is visually reviewed, and the times identified by computer analysis as   being spikes or seizures are reviewed again.    Compressed spectral analysis (CSA) is also performed on the activity recorded   from each individual channel.  This is displayed as a power display of   frequencies from 0 to 30 Hz over time.  The CSA analysis is done and displayed   continuously.  This is reviewed for asymmetries in power between homologous   areas of the scalp and for presence of changes in power which can be seen when   seizures occur.  Sections of suspected abnormalities on the CSA are then   compared with the original EEG recording.    Flaviar software was also utilized in the review of this study.  This software   suite  analyzes the EEG recording in multiple domains.  Coherence and rhythmicity   are computed to identify EEG sections which may contain organized seizures.    Each channel undergoes analysis to detect presence of spike and sharp waves   which have special and morphological characteristics of epileptic activity.  The   routine EEG recording is converted from special into frequency domain.  This is   then displayed comparing homologous areas to identify areas of significant   asymmetry.  Algorithm to identify non-cortically generated artifact is used to   separate artifact from the EEG.    RECORDING TIMES:  Start on 11/28/2018, at 07:00.  Stop on 11/28/2018, at 14:00.  A total of 7 hours and 0 minutes of EEG/Video telemetry were recorded.    EEG FINDINGS:  The patient was asleep at the beginning of the recording.    Background was a generalized mixture of low amplitude theta with some low range   beta intermixed.  Sleep spindles were recorded as stage II sleep was noted.    Upon awakening, a somewhat irregular 9 Hz to 10 Hz posterior dominant rhythm was   seen in the occipital leads with some spread into the parietal posterior   temporal area.  Occasionally, in the waking state, some midrange theta was noted   in the frontotemporal regions, but this occurred bilaterally but usually in an   asynchronous fashion.  No spike or sharp wave activity was recorded in either   the waking or sleeping state.    ACTIVATION PROCEDURES:  Intermittent photic stimulation was carried out, which produced a very mild   driving response without provoking pathological discharges.  Hyperventilation was carried out for 3 minutes, which did not significantly   affect the tracing.    Ictal:  No electrographic or clinical ictal events were recorded.    IMPRESSION:  Mildly abnormal EEG.  There is some asynchronous slowing noted in   the waking state in the temporal regions, but no spike or sharp wave activity   was recorded.      RR/IN  dd:  03/07/2019 09:55:53 (CST)  td: 03/07/2019 11:04:39 (CST)  Doc ID   #[id number]  Job ID #[id number]    CC:    no

## 2023-06-06 NOTE — H&P
Cardiology follow up note    Date: 6/7/2023    Problem List:  Patient Active Problem List   Diagnosis   • Complete tear of right rotator cuff   • Pacemaker, [employer], dual chamber (Adapta)   • Sleep disorder   • Other hyperlipidemia   • Essential hypertension   • History of atrial fibrillation   • History of melanoma   • Gastroesophageal reflux disease    .     CC:Hypertension, chronic diastolic heart failure,  Paroxysmal atrial fibrillation. Tricuspid regurgitation.    pacemaker  S: Jermain Yost is a 80 year old male who with a History of  has a past medical history of Allergy, Aortic valve insufficiency, ARMD (age-related macular degeneration), bilateral, BPH with urinary obstruction, CHF (congestive heart failure) (CMD), Chronic right shoulder pain, GERD (gastroesophageal reflux disease), Hyperlipidemia, Hypertension, Malignant neoplasm (CMD), Mitral valve regurgitation, PAF (paroxysmal atrial fibrillation) (CMD), and SSS (sick sinus syndrome) (CMD)., patient is here to follow up for Hypertension, chronic diastolic heart failure,  Paroxysmal atrial fibrillation. Tricuspid regurgitation.. The patient presents to the office and is doing good. The patient denies palpitations. The patient states his eyesight is not worsening. The patient states he becomes cold  easily. The patient states he has a cough. The patient states he has some orthopnea. The patient has no other cardiac complaints at this time.     Review of Systems:    Constitutional: Negative for fever and chills.   HEENT: Negative for ear pain or sore throat.  Respiratory: Negative cough or hemoptysis.    Gastrointestinal: Negative for nausea, vomiting, diarrhea or abdominal pain.   Genitourinary: Negative for dysuria, urgency or frequency.              Neurological: Negative for headache, loss of consciousness, confusion                         All other systems are reviewed and are negative except as documented in the HPI (History of Present  "[facility] Hospital Medicine  History & Physical    Patient Name: Terrell Yusuf  MRN: [id number]  Admission Date: 8/2/2018  Attending Physician: Mahnaz Obrien MD   Primary Care Provider: Scar Whitfield MD    [facility] MED E Ryne Osborn PA-C     Patient information was obtained from patient, past medical records and ER records.     Subjective:     Principal Problem:Recurrent seizures    Chief Complaint:   Chief Complaint   Patient presents with    Postictal     Pt was recently discharged for seizures. Pt was found at [facility] machine leaned over in wheelchair. Employees brought him back to ED and pt checked in again.        HPI: This is a 58-year-old male with a pmhx of seizures, currently homeless that was evaluated earlier in the ER for seizure activity.  The patient [place] brought to the ER after suspected witnessed seizure activity, but upon arrival was AAOx3. Per ED, he did experience an episode of 22 min generalized tonic-clonic activity in front of the previous ER attending, that ultimately self-resolved.  The patient's initial lactic acid was 12, confirming the episode of seizure.  Patient's other labs were grossly within normal limits including a negative UDS and normal alcohol level.  After the patient received 2 g of IV Keppra and was observed for several hours and continued to remain AAO x3 was allowed to be discharged home.  While in the waiting room in a wheelchair he experienced subsequent episode of witnessed generalized shaking, and was brought back into the ER.      Patient has been unable to afford his seizure medications and is currently homeless.  Also states that "Anders told me to stop taking my medications".  Would not elaborate further.  Patient postictal during my evaluation but oriented and appropriate otherwise.  Pt reports a hx of sezires for several years, and previously seen at [facility] for management.  HE " Illness).    Medications:  Current Outpatient Medications   Medication Sig Dispense Refill   • dilTIAZem (CARDIZEM CD) 360 MG 24 hr capsule TAKE 1 CAPSULE DAILY 90 capsule 1   • ASPIRIN 81 PO Take 81 mg by mouth daily.     • isosorbide mononitrate (IMDUR) 30 MG 24 hr tablet TAKE 1 TABLET DAILY 90 tablet 1   • omeprazole (PriLOSEC) 40 MG capsule Take 1 capsule by mouth daily. 90 capsule 3   • triamterene-hydrochlorothiazide (DYAZIDE) 37.5-25 MG per capsule Take 1 capsule by mouth every morning. 90 capsule 1   • simvastatin (ZOCOR) 20 MG tablet Take 1 tablet by mouth nightly. 90 tablet 2   • alfuzosin (Uroxatral) 10 MG 24 hr tablet Take 1 tablet by mouth daily. 90 tablet 3   • finasteride (PROSCAR) 5 MG tablet Take 1 tablet by mouth daily. 90 tablet 3     No current facility-administered medications for this visit.       Allergies:  ALLERGIES:  Fentanyl, Penicillin g, Ace inhibitors, Angiotensin receptor blockers, Promethazine, and Ramipril    Social History:  PAST MEDICAL HX:    Allergy                                                       Malignant neoplasm (CMD)                                        Comment: skin/melanoma    Hyperlipidemia                                                Hypertension                                                  PAF (paroxysmal atrial fibrillation) (CMD)                    SSS (sick sinus syndrome) (CMD)                               GERD (gastroesophageal reflux disease)                        Mitral valve regurgitation                                    BPH with urinary obstruction                                  CHF (congestive heart failure) (CMD)                          Aortic valve insufficiency                                    Chronic right shoulder pain                                   ARMD (age-related macular degeneration), bilat*               PAST SURGICAL HX:    APPENDECTOMY                                                  EYE SURGERY                              stopped taking his medications in February.  He experiences x1 seizure/year and and tonic-clinic in nature.  Pt denies B/B incontinence and saddle paresthesias, but reports tongue biting this evening.  Denies any known triggers, and denies ETOH, drug, smoking use.  Denies hx of MI, CVA or any other pmhx.  Pt denies HA, dizziness, weakness, f/c abd pain, CP, SOB.      In ED, afebrile, VSS, no leukocytosis. Acidotic with CO2 15, AG 19. Initial LA >12, given 1L of NS.  Repeat LA 0.9. Initial trop .010. CTH without acute abnormalities. EKG shows SR with 1st degree AVB (no previous to compare).    Past Medical History:   Diagnosis Date    Seizures        History reviewed. No pertinent surgical history.    Review of patient's allergies indicates:  No Known Allergies    Current Facility-Administered Medications on File Prior to Encounter   Medication    [COMPLETED] acetaminophen tablet 1,000 mg    [COMPLETED] levETIRAcetam (KEPPRA) 2,000 mg in dextrose 5 % 250 mL IVPB    [COMPLETED] sodium chloride 0.9% bolus 1,000 mL     Current Outpatient Prescriptions on File Prior to Encounter   Medication Sig    levETIRAcetam (KEPPRA) 500 MG Tab Take 1 tablet (500 mg total) by mouth 2 (two) times daily.     Family History     None        Social History Main Topics    Smoking status: Former Smoker    Smokeless tobacco: Never Used    Alcohol use Yes    Drug use: No    Sexual activity: Not on file     Review of Systems   Constitutional: Negative for activity change, appetite change, chills and fever.   HENT: Negative for congestion and rhinorrhea.    Eyes: Negative for photophobia and visual disturbance.   Respiratory: Negative for cough and shortness of breath.    Cardiovascular: Negative for chest pain and palpitations.   Gastrointestinal: Negative for abdominal pain, diarrhea, nausea and vomiting.   Genitourinary: Negative for difficulty urinating and dysuria.   Musculoskeletal: Positive for back pain (lower back). Negative                          Comment: cataracts    PACEMAKER IMPLANT                                               Comment: x2    PROSTATE BIOPSY                                 2018    Family History   Problem Relation Age of Onset   • Cancer Mother         throat/thyroid   • Parkinsonism Mother    • Heart disease Father    • Stroke Brother         CVAx2     Review of patient's family status indicates:    Mother                                           Father                                           Brother                                                    Social History     Socioeconomic History   • Marital status: /Civil Union     Spouse name: Not on file   • Number of children: Not on file   • Years of education: Not on file   • Highest education level: Not on file   Occupational History   • Not on file   Tobacco Use   • Smoking status: Former     Current packs/day: 0.00     Types: Cigarettes     Quit date:      Years since quittin.4   • Smokeless tobacco: Never   Vaping Use   • Vaping Use: never used   Substance and Sexual Activity   • Alcohol use: Yes     Comment: occasional   • Drug use: No   • Sexual activity: Not Currently     Partners: Female   Other Topics Concern   • Not on file   Social History Narrative   • Not on file     Social Determinants of Health     Financial Resource Strain: Not on file   Food Insecurity: Not on file   Transportation Needs: Not on file   Physical Activity: Not on file   Stress: Not on file   Social Connections: Not on file   Intimate Partner Violence: Not At Risk (10/15/2022)    Intimate Partner Violence    • Social Determinants: Intimate Partner Violence Past Fear: No    • Social Determinants: Intimate Partner Violence Current Fear: No         O:   Visit Vitals  /68   Pulse 83   Resp 14   Ht 6' (1.829 m)   Wt 96.6 kg (213 lb)   SpO2 96%   BMI 28.89 kg/m²       Vital Most Recent Value First Value   Weight 96.6 kg (213 lb) Weight: 96.6  for neck pain and neck stiffness.   Skin: Negative for rash and wound.   Neurological: Positive for seizures. Negative for dizziness, weakness and headaches.   Psychiatric/Behavioral: Negative for agitation and confusion.     Objective:     Vital Signs (Most Recent):  Temp: 98.3 °F (36.8 °C) (08/02/18 1859)  Pulse: (!) 58 (08/02/18 2203)  Resp: 16 (08/02/18 1859)  BP: 123/71 (08/02/18 2147)  SpO2: 97 % (08/02/18 2147) Vital Signs (24h Range):  Temp:  [98.3 °F (36.8 °C)-98.4 °F (36.9 °C)] 98.3 °F (36.8 °C)  Pulse:  [58-99] 58  Resp:  [16-19] 16  SpO2:  [94 %-100 %] 97 %  BP: ()/(55-90) 123/71        There is no height or weight on file to calculate BMI.    Physical Exam   Constitutional: He is oriented to person, place, and time. He appears well-developed and well-nourished.   Lethargic but arousable by voice   HENT:   Head: Normocephalic and atraumatic.   Dried blood to frontal tongue, superficial injury   Eyes: Conjunctivae and EOM are normal. Pupils are equal, round, and reactive to light.   Neck: Normal range of motion. Neck supple.   No TTP to neck, FROM   Cardiovascular: Normal rate, regular rhythm and normal heart sounds.    Pulmonary/Chest: Effort normal and breath sounds normal. No respiratory distress. He has no wheezes.   Abdominal: Soft. Bowel sounds are normal. He exhibits no distension. There is no tenderness. There is no guarding.   Musculoskeletal: Normal range of motion. He exhibits no edema or tenderness.   Finger-nose coordination intact  Strength grossly intact to extremities   Neurological: He is alert and oriented to person, place, and time.   Skin: Skin is warm and dry.   Psychiatric: He has a normal mood and affect. Judgment and thought content normal. He is slowed. He is inattentive.   Nursing note and vitals reviewed.        CRANIAL NERVES     CN III, IV, VI   Pupils are equal, round, and reactive to light.  Extraocular motions are normal.        Significant Labs:   CBC:   Recent  Labs  Lab 08/02/18  1432   WBC 8.07   HGB 13.0*   HCT 39.4*        CMP:   Recent Labs  Lab 08/02/18  1432 08/02/18  2136    138   K 4.0 3.7    107   CO2 15* 26    114*   BUN 20 16   CREATININE 1.0 0.9   CALCIUM 8.9 8.6*   PROT 6.9  --    ALBUMIN 3.7  --    BILITOT 0.4  --    ALKPHOS 118  --    AST 18  --    ALT 16  --    ANIONGAP 19* 5*   EGFRNONAA >60.0 >60.0     Lactic Acid:   Recent Labs  Lab 08/02/18  1432 08/02/18  2136   LACTATE >12.0* 0.9       Significant Imaging: I have reviewed all pertinent imaging results/findings within the past 24 hours.   Ct Head Without Contrast    Result Date: 8/2/2018  EXAMINATION: CT HEAD WITHOUT CONTRAST CLINICAL HISTORY: Seizures new or progressive; TECHNIQUE: Low dose axial images were obtained through the head.  Coronal and sagittal reformations were also performed. Contrast was not administered. COMPARISON: None. FINDINGS: The brain parenchyma appears normal for age with good corticomedullary differentiation.  There is no evidence of acute infarct, hemorrhage, or mass.  The ventricular system is normal in size.  No mass-effect or midline shift.  There are no abnormal extra-axial fluid collections.  The paranasal sinuses and mastoid air cells are clear.  The calvarium appears intact.  .     No acute intracranial abnormalities    Assessment/Plan:     * Recurrent seizures    58-year-old male with a pmhx of seizures, currently homeless that was evaluated earlier in the ER for seizure activity which re-occurred in the waiting room s/p keppra loading.    - noncompliant with keppra  - c/w keppra 500 mg PO BID s/p 2g keppra loading  - check keppra level in AM  - EEG >1 hour ordered  - Neurology consulted and appreciate recs  - CTH without acute abnormalities.  - afebrile, VSS, no leukocytosis.   - Initially acidotic with CO2 15, AG 19- resolved with IVF.   - Initial LA >12, given 1L of NS.  Repeat LA 0.9.   - Troponins flat x2. EKG shows SR with 1st degree  kg (213 lb)   Height 6' (182.9 cm) Height: 6' (182.9 cm)         Physical Exam:    Neurological/psychiatric. Patient is oriented to time place and person. Patient does not have anxiety or agitation  Constitutional: Well-developed appear in good nutrition.  Mouth/Throat: Oropharynx is clear and moist.   Eyes: Conjunctivae is  normal. Bilateral pupils are round and normal diameter.    Neck:   No obvious jugular vein distention no dye A wave    Cardiovascular:    Palpation of the heart demonstrated normal size and location of point of  maximal impact, no thrills, no palpable S3.   Auscultation of the heart demonstrated :paced  rhythm normal rate  normal S1 and S2 sys  murmur.   Carotid arteries: have no bruit.   Abdominal aorta: No palpable abdominal mass no bruit.   Femoral artery: Pulse +1 bilaterally, no bruits   Pedal pulses:Pulse +1 bilaterally.   Bilateral lower extremity edema negative    Gastrointestinal/Abdominal: Soft. Bowel sounds are normal.  no distension and no mass. No significant enlargement of liver and spleen.    Respiratory: Normal Breath sounds  normal breath effort No respiratory distress.  no wheezes.  no rales.      Skin: no rash, warm              Assessment and Plan:    - Chronic diastolic heart failure, NYHA class 2 in good control, continue medications.  - Atrial fibrillation, good rate control, continue medications.  - Tricuspid regurgitation, stable continue monitoring  - Dyspnea on exertion equivocal stress test low risk, improved, asymptomatic, pt instructed to increase walking level, continue monitoring  - fatigue, order a TSH. Continue monitoring.     - The patient understood and agreed with the plan.   - All questions and concerns have been addressed.    -pacemaker normal function 80% pacing    Return in about 6 months (around 12/7/2023). Or sooner if symptoms worsen. Instructions provided as documented in the after visit summary.    On 6/7/2023 Sherry RAJPUT, personally  AVB (no previous to compare).  - ETOH negative, UDS negative  - UA pending  - seizure precautions, fall precautions, neuro checks Q4 hours        Noncompliance with medications    Reports noncompliance with medications d/t money issues.  Also states that Anders told him to stop taking his medications, but was postictal at the time.  May need outpatient psych evaluation.  - Need to reinforce taking medications regularly.  - explained risks of not taking keppra        Homeless    - homeless x1.5 years  - will need assistance from  for f/u; has medicaid          VTE Risk Mitigation         Ordered     Place JAMES hose  Until discontinued      08/02/18 2124     Place sequential compression device  Until discontinued      08/02/18 2124     IP VTE LOW RISK PATIENT  Once      08/02/18 2124             Ryne Osborn PA-C  Department of Hospital Medicine   [facility]   transcribed this document on behalf of Evon Downs MD     The documentation recorded by the scribe accurately and completely reflects the service(s) I personally performed and the decisions made by me.         Evon Downs MD    [phone]    [facility]/ [facility]

## 2024-11-18 ENCOUNTER — HOSPITAL ENCOUNTER (EMERGENCY)
Facility: HOSPITAL | Age: 64
Discharge: HOME | End: 2024-11-19
Attending: STUDENT IN AN ORGANIZED HEALTH CARE EDUCATION/TRAINING PROGRAM
Payer: MEDICAID

## 2024-11-18 VITALS
HEIGHT: 70 IN | DIASTOLIC BLOOD PRESSURE: 67 MMHG | OXYGEN SATURATION: 95 % | SYSTOLIC BLOOD PRESSURE: 121 MMHG | WEIGHT: 180 LBS | HEART RATE: 79 BPM | TEMPERATURE: 98 F | RESPIRATION RATE: 20 BRPM | BODY MASS INDEX: 25.77 KG/M2

## 2024-11-18 DIAGNOSIS — S22.32XA CLOSED FRACTURE OF ONE RIB OF LEFT SIDE, INITIAL ENCOUNTER: Primary | ICD-10-CM

## 2024-11-18 PROCEDURE — 99285 EMERGENCY DEPT VISIT HI MDM: CPT

## 2024-11-19 LAB
ALBUMIN SERPL BCP-MCNC: 4 G/DL (ref 3.5–5.2)
ALP SERPL-CCNC: 117 U/L (ref 40–150)
ALT SERPL W/O P-5'-P-CCNC: 26 U/L (ref 10–44)
ANION GAP SERPL CALC-SCNC: 11 MMOL/L (ref 8–16)
AST SERPL-CCNC: 21 U/L (ref 10–40)
BASOPHILS # BLD AUTO: 0.07 K/UL (ref 0–0.2)
BASOPHILS NFR BLD: 1 % (ref 0–1.9)
BILIRUB SERPL-MCNC: 0.2 MG/DL (ref 0.1–1)
BILIRUB UR QL STRIP: NEGATIVE
BUN SERPL-MCNC: 28 MG/DL (ref 8–23)
CALCIUM SERPL-MCNC: 9.6 MG/DL (ref 8.7–10.5)
CHLORIDE SERPL-SCNC: 104 MMOL/L (ref 95–110)
CLARITY UR REFRACT.AUTO: CLEAR
CO2 SERPL-SCNC: 22 MMOL/L (ref 23–29)
COLOR UR AUTO: COLORLESS
CREAT SERPL-MCNC: 0.9 MG/DL (ref 0.5–1.4)
DIFFERENTIAL METHOD BLD: ABNORMAL
EOSINOPHIL # BLD AUTO: 0.1 K/UL (ref 0–0.5)
EOSINOPHIL NFR BLD: 1.9 % (ref 0–8)
ERYTHROCYTE [DISTWIDTH] IN BLOOD BY AUTOMATED COUNT: 13 % (ref 11.5–14.5)
EST. GFR  (NO RACE VARIABLE): >60 ML/MIN/1.73 M^2
GLUCOSE SERPL-MCNC: 95 MG/DL (ref 70–110)
GLUCOSE UR QL STRIP: NEGATIVE
HCT VFR BLD AUTO: 35.2 % (ref 40–54)
HCV AB SERPL QL IA: NORMAL
HGB BLD-MCNC: 12 G/DL (ref 14–18)
HGB UR QL STRIP: NEGATIVE
HIV 1+2 AB+HIV1 P24 AG SERPL QL IA: NORMAL
IMM GRANULOCYTES # BLD AUTO: 0.02 K/UL (ref 0–0.04)
IMM GRANULOCYTES NFR BLD AUTO: 0.3 % (ref 0–0.5)
KETONES UR QL STRIP: NEGATIVE
LEUKOCYTE ESTERASE UR QL STRIP: NEGATIVE
LYMPHOCYTES # BLD AUTO: 2 K/UL (ref 1–4.8)
LYMPHOCYTES NFR BLD: 29.6 % (ref 18–48)
MCH RBC QN AUTO: 31.5 PG (ref 27–31)
MCHC RBC AUTO-ENTMCNC: 34.1 G/DL (ref 32–36)
MCV RBC AUTO: 92 FL (ref 82–98)
MONOCYTES # BLD AUTO: 0.8 K/UL (ref 0.3–1)
MONOCYTES NFR BLD: 12 % (ref 4–15)
NEUTROPHILS # BLD AUTO: 3.8 K/UL (ref 1.8–7.7)
NEUTROPHILS NFR BLD: 55.2 % (ref 38–73)
NITRITE UR QL STRIP: NEGATIVE
NRBC BLD-RTO: 0 /100 WBC
PH UR STRIP: 5 [PH] (ref 5–8)
PLATELET # BLD AUTO: 263 K/UL (ref 150–450)
PMV BLD AUTO: 10.2 FL (ref 9.2–12.9)
POTASSIUM SERPL-SCNC: 3.9 MMOL/L (ref 3.5–5.1)
PROT SERPL-MCNC: 7.1 G/DL (ref 6–8.4)
PROT UR QL STRIP: NEGATIVE
RBC # BLD AUTO: 3.81 M/UL (ref 4.6–6.2)
SODIUM SERPL-SCNC: 137 MMOL/L (ref 136–145)
SP GR UR STRIP: 1.01 (ref 1–1.03)
URN SPEC COLLECT METH UR: ABNORMAL
WBC # BLD AUTO: 6.86 K/UL (ref 3.9–12.7)

## 2024-11-19 PROCEDURE — 80053 COMPREHEN METABOLIC PANEL: CPT

## 2024-11-19 PROCEDURE — 87389 HIV-1 AG W/HIV-1&-2 AB AG IA: CPT | Performed by: PHYSICIAN ASSISTANT

## 2024-11-19 PROCEDURE — 80177 DRUG SCRN QUAN LEVETIRACETAM: CPT

## 2024-11-19 PROCEDURE — 81003 URINALYSIS AUTO W/O SCOPE: CPT

## 2024-11-19 PROCEDURE — 85025 COMPLETE CBC W/AUTO DIFF WBC: CPT

## 2024-11-19 PROCEDURE — 86803 HEPATITIS C AB TEST: CPT | Performed by: PHYSICIAN ASSISTANT
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
## 2024-11-19 NOTE — ED NOTES
Patient identifiers for Terrell Yusuf 64 y.o. male checked and correct.  Chief Complaint   Patient presents with    Flank Pain     Left flank pain x 1 month   Denies N/V/D. Denies dysuria, hematuria, frequency/ urgency. Denies groin pain. Reports had seizure 5 days ago.   Past Medical History:   Diagnosis Date    Chronic back pain     Ex-cigarette smoker     9 years ago    History of admission to inpatient psychiatry department     Seizures      Allergies reported:   Review of patient's allergies indicates:   Allergen Reactions    Codeine Other (See Comments)     As a child and teenager         HEENT: Denies vision changes. Denies ear drainage or hearing loss. No c/o nasal drainage. Denies dysphagia or voice changes.   Appearance: Pt awake, alert & oriented to person, place & time. Pt in no acute distress at present time. Pt is clean and well groomed with clothes appropriately fastened.   Skin: Skin warm, dry & intact. Color consistent with ethnicity. Mucous membranes moist. No breakdown or brusing noted.   Musculoskeletal: Patient moving all extremities well, no obvious swelling or deformities noted.   Respiratory: Respirations spontaneous, even, and non-labored. Visible chest rise noted. Airway is open and patent. No accessory muscle use noted.   Neurologic: Sensation is intact. Speech is clear and appropriate. Eyes open spontaneously, behavior appropriate to situation, follows commands, facial expression symmetrical, bilateral hand grasp equal and even, purposeful motor response noted.  Cardiac: All peripheral pulses present. No Bilateral lower extremity edema. Cap refill is <3 seconds.  Abdomen: Abdomen soft, non distended, non tender to palpation. + R oblique pain +L flank pain  : Pt voids independently, denies dysuria, hematuria, frequency.

## 2024-11-19 NOTE — DISCHARGE INSTRUCTIONS
"Subacute right posterolateral 7th rib fracture.     It was it was cause of your pain and we will heal with time.  You can take Tylenol.  Avoid strenuous activity.  Follow up with your primary care physician.    Incidental finding of "there is appearance of haziness and stranding and multiple small lymph nodes, this may relate to edema and or inflammation, may relate to a process such as mesenteric panniculitis, this time not consistent your presentation but you can follow up with your primary care physician outpatient imaging if needed."    "

## 2024-11-21 LAB — LEVETIRACETAM SERPL-MCNC: 49.6 UG/ML (ref 3–60)
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
